# Patient Record
Sex: FEMALE | Race: WHITE | NOT HISPANIC OR LATINO | Employment: OTHER | URBAN - METROPOLITAN AREA
[De-identification: names, ages, dates, MRNs, and addresses within clinical notes are randomized per-mention and may not be internally consistent; named-entity substitution may affect disease eponyms.]

---

## 2017-03-11 ENCOUNTER — HOSPITAL ENCOUNTER (INPATIENT)
Facility: HOSPITAL | Age: 72
LOS: 5 days | Discharge: HOME | DRG: 064 | End: 2017-03-16
Attending: ANESTHESIOLOGY | Admitting: ANESTHESIOLOGY
Payer: MEDICARE

## 2017-03-11 DIAGNOSIS — G81.91 HEMIPARESIS, RIGHT: ICD-10-CM

## 2017-03-11 DIAGNOSIS — I61.9 LEFT-SIDED NONTRAUMATIC INTRACEREBRAL HEMORRHAGE: ICD-10-CM

## 2017-03-11 DIAGNOSIS — I62.9 INTRACRANIAL BLEED: ICD-10-CM

## 2017-03-11 DIAGNOSIS — F17.210 CIGARETTE SMOKER: ICD-10-CM

## 2017-03-11 DIAGNOSIS — G93.6 VASOGENIC CEREBRAL EDEMA: ICD-10-CM

## 2017-03-11 DIAGNOSIS — I61.0 NONTRAUMATIC SUBCORTICAL HEMORRHAGE OF LEFT CEREBRAL HEMISPHERE: ICD-10-CM

## 2017-03-11 LAB
ABO + RH BLD: NORMAL
APTT BLDCRRT: 23.1 SEC
BLD GP AB SCN CELLS X3 SERPL QL: NORMAL
CHOLEST/HDLC SERPL: 4.7 {RATIO}
HDL/CHOLESTEROL RATIO: 21.3 %
HDLC SERPL-MCNC: 254 MG/DL
HDLC SERPL-MCNC: 54 MG/DL
INR PPP: 1
LDLC SERPL CALC-MCNC: 177.6 MG/DL
NONHDLC SERPL-MCNC: 200 MG/DL
POCT GLUCOSE: 100 MG/DL (ref 70–110)
POCT GLUCOSE: 123 MG/DL (ref 70–110)
PROTHROMBIN TIME: 10.9 SEC
TRIGL SERPL-MCNC: 112 MG/DL
TSH SERPL DL<=0.005 MIU/L-ACNC: 3.06 UIU/ML

## 2017-03-11 PROCEDURE — 99223 1ST HOSP IP/OBS HIGH 75: CPT | Mod: ,,, | Performed by: PSYCHIATRY & NEUROLOGY

## 2017-03-11 PROCEDURE — 63600175 PHARM REV CODE 636 W HCPCS: Performed by: PHYSICIAN ASSISTANT

## 2017-03-11 PROCEDURE — 86850 RBC ANTIBODY SCREEN: CPT

## 2017-03-11 PROCEDURE — 85730 THROMBOPLASTIN TIME PARTIAL: CPT | Mod: 91

## 2017-03-11 PROCEDURE — 84443 ASSAY THYROID STIM HORMONE: CPT

## 2017-03-11 PROCEDURE — 25000003 PHARM REV CODE 250

## 2017-03-11 PROCEDURE — 83036 HEMOGLOBIN GLYCOSYLATED A1C: CPT

## 2017-03-11 PROCEDURE — 20000000 HC ICU ROOM

## 2017-03-11 PROCEDURE — 86900 BLOOD TYPING SEROLOGIC ABO: CPT

## 2017-03-11 PROCEDURE — 80061 LIPID PANEL: CPT

## 2017-03-11 PROCEDURE — 25000003 PHARM REV CODE 250: Performed by: PHYSICIAN ASSISTANT

## 2017-03-11 PROCEDURE — 99291 CRITICAL CARE FIRST HOUR: CPT | Mod: 25,,, | Performed by: ANESTHESIOLOGY

## 2017-03-11 PROCEDURE — 25000003 PHARM REV CODE 250: Performed by: ANESTHESIOLOGY

## 2017-03-11 PROCEDURE — 36620 INSERTION CATHETER ARTERY: CPT | Mod: ,,, | Performed by: ANESTHESIOLOGY

## 2017-03-11 PROCEDURE — 25000003 PHARM REV CODE 250: Performed by: PSYCHIATRY & NEUROLOGY

## 2017-03-11 PROCEDURE — 85610 PROTHROMBIN TIME: CPT | Mod: 91

## 2017-03-11 RX ORDER — SODIUM CHLORIDE 9 MG/ML
INJECTION, SOLUTION INTRAVENOUS CONTINUOUS
Status: DISCONTINUED | OUTPATIENT
Start: 2017-03-11 | End: 2017-03-13

## 2017-03-11 RX ORDER — ONDANSETRON 8 MG/1
8 TABLET, ORALLY DISINTEGRATING ORAL EVERY 8 HOURS PRN
Status: DISCONTINUED | OUTPATIENT
Start: 2017-03-11 | End: 2017-03-16 | Stop reason: HOSPADM

## 2017-03-11 RX ORDER — SODIUM CHLORIDE 0.9 % (FLUSH) 0.9 %
3 SYRINGE (ML) INJECTION EVERY 8 HOURS
Status: DISCONTINUED | OUTPATIENT
Start: 2017-03-11 | End: 2017-03-16 | Stop reason: HOSPADM

## 2017-03-11 RX ORDER — LABETALOL HYDROCHLORIDE 5 MG/ML
10 INJECTION, SOLUTION INTRAVENOUS EVERY 4 HOURS PRN
Status: DISCONTINUED | OUTPATIENT
Start: 2017-03-11 | End: 2017-03-14

## 2017-03-11 RX ORDER — NICARDIPINE HYDROCHLORIDE 0.2 MG/ML
INJECTION INTRAVENOUS
Status: COMPLETED
Start: 2017-03-11 | End: 2017-03-11

## 2017-03-11 RX ORDER — NICARDIPINE HYDROCHLORIDE 0.2 MG/ML
1 INJECTION INTRAVENOUS CONTINUOUS
Status: DISCONTINUED | OUTPATIENT
Start: 2017-03-11 | End: 2017-03-12

## 2017-03-11 RX ORDER — NAPROXEN SODIUM 220 MG
220 TABLET ORAL NIGHTLY PRN
COMMUNITY
End: 2021-08-18

## 2017-03-11 RX ADMIN — DESMOPRESSIN ACETATE 24.39 MCG: 4 SOLUTION INTRAVENOUS at 09:03

## 2017-03-11 RX ADMIN — SODIUM CHLORIDE 1000 ML: 0.9 INJECTION, SOLUTION INTRAVENOUS at 09:03

## 2017-03-11 RX ADMIN — SODIUM CHLORIDE: 0.9 INJECTION, SOLUTION INTRAVENOUS at 04:03

## 2017-03-11 RX ADMIN — NICARDIPINE HYDROCHLORIDE 10 MG/HR: 0.2 INJECTION, SOLUTION INTRAVENOUS at 04:03

## 2017-03-11 NOTE — CONSULTS
[facility]  Vascular Neurology  Comprehensive Stroke Center  Consult Note      Inpatient consult to Vascular (Stroke) Neurology  Consult performed by: GREGORY CURIEL  Consult ordered by: LOPEZ DUBOIS        Subjective:     History of Present Illness:  Kaylin Neff is a 71 y.o. Female with PMHx of HTN presented to OSH with slurred speech, R arm weakness, and balance issues. She was last known to be normal at 9am on 3/11/17. The patient states that she was in the bathroom washing her hair when her symptoms began. Telestroke call was made and head CT ordered. Initial NIH score was 5. Patient had L ICH on CT and [place] [facility] [place]                Past Medical History:   Diagnosis Date    Hypertension      No past surgical history on file.  No family history on file.  Social History   Substance Use Topics    Smoking status: Not on file    Smokeless tobacco: Not on file    Alcohol use Not on file     Review of patient's allergies indicates:  No Known Allergies  Medications: I have reviewed the current medication administration record.    No prescriptions prior to admission.       Review of Systems   Constitutional: Negative for chills and fever.   HENT: Negative for drooling.    Eyes: Negative for redness.   Respiratory: Negative for cough and shortness of breath.    Cardiovascular: Negative for chest pain and palpitations.   Gastrointestinal: Negative for vomiting.   Musculoskeletal: Negative for joint swelling.   Skin: Negative for rash.   Neurological: Positive for facial asymmetry, speech difficulty, weakness and numbness.   Psychiatric/Behavioral: Negative for agitation and behavioral problems.     Objective:     Vital Signs (Most Recent):  Pulse: 85 (03/11/17 1445)  Resp: 17 (03/11/17 1445)  BP: 128/64 (03/11/17 1445)  SpO2: 98 % (03/11/17 1445)    Vital Signs Range (Last 24H):  Temp:  [98 °F (36.7 °C)]   Pulse:  []   Resp:  [12-23]   BP:  (128-235)/()   SpO2:  [97 %-99 %]     Physical Exam   Constitutional: She is oriented to person, place, and time. She appears well-developed and well-nourished. No distress.   HENT:   Head: Normocephalic and atraumatic.   Eyes: EOM are normal. Pupils are equal, round, and reactive to light.   Neck: Normal range of motion.   Cardiovascular: Normal rate.    Pulmonary/Chest: Effort normal. No respiratory distress.   Abdominal: Soft. She exhibits no distension.   Musculoskeletal: Normal range of motion.   Neurological: She is alert and oriented to person, place, and time. GCS eye subscore is 4 - spontaneous. GCS verbal subscore is 5 - oriented. GCS motor subscore is 6 - obeys commands.   Skin: Skin is warm and dry.   Psychiatric: She has a normal mood and affect. Her behavior is normal.   Vitals reviewed.      Neurological Exam:   LOC: alert and follows requests  Language: No aphasia  Speech: Dysarthria  Orientation: Person, Place, Time  Memory: Recent memory intact, Remote memory intact, Age correct, Month correct  Visual Fields (CN II): Full  EOM (CN III, IV, VI): Full/intact  Oculocephalics: normal  Pupils (CN III, IV, VI): PERRL  Facial Sensation (CN V): Symmetric  Facial Movement (CN VII): lower weakness right lower  Hearing (CN VIII): intact bilaterally  Motor*: Arm Left:  Normal (5/5), Leg Left:   Normal (5/5), Arm Right:   Paretic:  4/5, Leg Right:   Paretic:  4/5  Cerebellar*: Normal limb, Normal gait  , Normal stance  Sensation: rebeca-hypoesthesia right  Tone: Arm-Left: normal; Leg-Left: normal; Arm-Right: normal; Leg-Right: normal    NIH Stroke Scale:  Interval: baseline (upon arrival/admit)  Level of Consciousness: 0 - alert  LOC Questions: 0 - answers both correctly  LOC Commands: 0 - performs both correctly  Best Gaze: 0 - normal  Visual: 0 - no visual loss  Facial Palsy: 1 - minor  Motor Left Arm: 0 - no drift  Motor Right Arm: 1 - drift  Motor Left Le - no drift  Motor Right Le - drift  Limb  Ataxia: 0 - absent  Sensory: 1 - mild to moderate loss  Best Language: 1 - mild to moderate aphasia  Dysarthria: 1 - mild to moderate dysarthria  Extinction and Inattention: 0 - no neglect  NIH Stroke Scale Total: 6  Ruth Coma Scale:  Best Eye Response: 4 - spontaneous  Best Motor Response: 6 - obeys commands  Best Verbal Response: 5 - oriented  [place] Coma Scale Total: 15  Modified [place]d Scale:   Timeline: Prior to symptoms onset  Modified [place] Score: 0 - no symptoms    ICH Scale:   Ruth Coma Score: 0 - 13-15  Age > or = 80: 0 - no  ICH Volume > or = 30 mL: 0 - no  Intraventricular Hemorrhage: 0 - no  Infratentorial Origin of Hemorrhage: 0 - no  ICH Scale Total: 0      Laboratory:  CMP:   Recent Labs  Lab 03/11/17  1047   CALCIUM 10.0   ALBUMIN 4.6   PROT 7.9      K 4.7   CO2 29      BUN 18*   CREATININE 0.80   ALKPHOS 96   ALT 25   AST 30   BILITOT 1.2     CBC:   Recent Labs  Lab 03/11/17  1047   WBC 7.00   RBC 5.15   HGB 15.7   HCT 46.4      MCV 90   MCH 30.4   MCHC 33.8     Lipid Panel: No results for input(s): CHOL, LDLCALC, HDL, TRIG in the last 168 hours.  Coagulation:   Recent Labs  Lab 03/11/17  1047   INR 1.0   APTT 27.0     Platelet Aggregation Study: No results for input(s): PLTAGG, PLTAGINTERP, PLTAGREGLACO, ADPPLTAGGREG in the last 168 hours.  Hgb A1C: No results for input(s): HGBA1C in the last 168 hours.  TSH: No results for input(s): TSH in the last 168 hours.    Diagnostic Results:  Brain Imaging: CT Head. Date: 03/11/17  Acute Pathology: ICH  Location: Frontal:  left          Assessment/Plan:     Patient is a 71 y.o. year old female with:    * Left-sided nontraumatic intracerebral hemorrhage  Kaylin Neff is a 71 y.o. female with PMHx of HTN with L frontal ICH    Antithrombotics for secondary stroke prevention: None: Reason:  Hemorrhagic Stroke, Statins for secondary stroke prevention and hyperlipidemia, if present: Atorvastatin- 40 mg oral daily, Aggressive risk  factor modification: Hypertension and Smoking, Rehab Efforts: Physical Therapy, Occupational Therapy and Speech and Language Pathology, Diagnostics: Ordered/Pending HgbA1C to assess blood glucose levels, Lipid Profile to assess cholesterol levels, MRA head to assess vasculature, MRA neck/arch to assess vasculature, MRI head without contrast to assess brain parenchyma, Trans-thoracic cardiac echo to assess cardiac function/status, TSH to assess thyroid function, VTE Prophylaxis: SCDs, BP Parameters: SBP <140, Nursing Orders: Neuro checks- Q1H, Swallowing evaluation by Nursing, Seizure precautions, Out of bed BID, Avoid Landaverde catheter, Pneumatic compression device, Stroke Education, Blood glucose target 100-130, Up with assistance and IV Fluids: per primary team      Hemiparesis, right  Patient with 4/5 strength in RUE and RLE  PT and OT to evaluate and treat    Essential hypertension  Stroke risk factor  SBP <140      Vasogenic cerebral edema  Evident on imaging  2/2 ICH    Cigarette smoker  Stroke risk factor   on cessation      Thrombolysis Candidate? No  1. Contraindications: CT findings (ICH, SAH, major infact sign)    Interventional Revascularization Candidate?  no, ICH    Research Candidate? No        Myesha Abad PA-C  Comprehensive Stroke Center  Department of Vascular Neurology   [facility]

## 2017-03-11 NOTE — IP AVS SNAPSHOT
[facility]  [address]  [facility] [address]  Phone: [phone]           Patient Discharge Instructions     Our goal is to set you up for success. This packet includes information on your condition, medications, and your home care. It will help you to care for yourself so you don't get sicker and need to go back to the hospital.     Please ask your nurse if you have any questions.        There are many details to remember when preparing to leave the hospital. Here is what you will need to do:    1. Take your medicine. If you are prescribed medications, review your Medication List in the following pages. You may have new medications to  at the pharmacy and others that you'll need to stop taking. Review the instructions for how and when to take your medications. Talk with your doctor or nurses if you are unsure of what to do.     2. Go to your follow-up appointments. Specific follow-up information is listed in the following pages. Your may be contacted by a transition nurse or clinical provider about future appointments. Be sure we have all of the phone numbers to reach you, if needed. Please contact your provider's office if you are unable to make an appointment.     3. Watch for warning signs. Your doctor or nurse will give you detailed warning signs to watch for and when to call for assistance. These instructions may also include educational information about your condition. If you experience any of warning signs to your health, call your doctor.               [facility]  Unless otherwise directed by your provider, please contact [facility], our nurse care line that is available for 24/7 assistance.     [phone] (toll-free)    Registered nurses in the [facility] provide clinical advisement, health education, appointment booking, and other advisory services.                    ** Verify the list of medication(s) below is accurate and up  to date. Carry this with you in case of emergency. If your medications have changed, please notify your healthcare provider.             Medication List      START taking these medications        Additional Info                      atorvastatin 40 MG tablet   Commonly known as:  LIPITOR   Quantity:  90 tablet   Refills:  3   Dose:  40 mg    Last time this was given:  40 mg on 3/16/2017  9:20 AM   Instructions:  Take 1 tablet (40 mg total) by mouth once daily.     Begin Date    AM    Noon    PM    Bedtime       lisinopril 20 MG tablet   Commonly known as:  PRINIVIL,ZESTRIL   Quantity:  90 tablet   Refills:  3   Dose:  20 mg    Last time this was given:  20 mg on 3/16/2017  9:20 AM   Instructions:  Take 1 tablet (20 mg total) by mouth once daily.     Begin Date    AM    Noon    PM    Bedtime       metoprolol tartrate 25 MG tablet   Commonly known as:  LOPRESSOR   Quantity:  84 tablet   Refills:  0   Dose:  25 mg    Last time this was given:  25 mg on 3/16/2017  2:21 PM   Instructions:  Take 1 tablet (25 mg total) by mouth 3 (three) times daily.     Begin Date    AM    Noon    PM    Bedtime         CONTINUE taking these medications        Additional Info                      naproxen sodium 220 MG tablet   Commonly known as:  ANAPROX   Refills:  0   Dose:  220 mg    Instructions:  Take 220 mg by mouth nightly as needed.     Begin Date    AM    Noon    PM    Bedtime            Where to Get Your Medications      These medications were sent to [facility] [address] [place] [id number]     Phone:  [phone]     atorvastatin 40 MG tablet    lisinopril 20 MG tablet    metoprolol tartrate 25 MG tablet                  Please bring to all follow up appointments:    1. A copy of your discharge instructions.  2. All medicines you are currently taking in their original bottles.  3. Identification and insurance card.    Please arrive 15 minutes ahead of scheduled  "appointment time.    Please call 24 hours in advance if you must reschedule your appointment and/or time.        Your Scheduled Appointments     Mar 27, 2017  2:15 PM CDT   Mri Brain Cont with [facility] MRI2   [facility]    [address]  [facility] [address] [phone]            Mar 27, 2017  3:20 PM CDT   Established Patient Visit with [facility] - Neurosurgery 7th Fl ([address] )    [address]  [facility] [address] [phone]                  Primary Diagnosis     Your primary diagnosis was:  Left-Sided Nontraumatic Intracerebral Hemorrhage      Admission Information     Date & Time Provider Department CSN    3/11/2017  2:11 PM Zoraida Walton MD [facility] [id number]      Care Providers     Provider Role Specialty Primary office phone    Zoraida Walton MD Attending Provider Neurology [phone]    Deandre Jordan MD Team Attending  Neuro Critical Care [phone]    Mitchell Anne MD Team Attending  Neuro Critical Care [phone]    Sathish Beverly MD Team Attending  Interventional Neurology [phone]    Joaquim Burns MD Team Attending  Neurology [phone]      Your Vitals Were     BP Pulse Temp Resp Height Weight    126/65 (BP Location: Left arm, Patient Position: Lying, BP Method: Automatic) 70 98.6 °F (37 °C) (Oral) 18 5' 6.5" (1.689 m) 80.5 kg (177 lb 7.5 oz)    SpO2 BMI             95% 28.22 kg/m2         Recent Lab Values        3/11/2017                           4:25 PM           A1C 6.1           Comment for A1C at  4:25 PM on 3/11/2017:  According to ADA guidelines, hemoglobin A1C <7.0% represents  optimal control in non-pregnant diabetic patients.  Different  metrics may apply to specific populations.   Standards of Medical Care in Diabetes - 2016.  For the purpose of screening for the presence of diabetes:  <5.7%     Consistent with the absence of " diabetes  5.7-6.4%  Consistent with increasing risk for diabetes   (prediabetes)  >or=6.5%  Consistent with diabetes  Currently no consensus exists for use of hemoglobin A1C  for diagnosis of diabetes for children.        Allergies as of 3/16/2017     No Known Allergies      Advance Directives     An advance directive is a document which, in the event you are no longer able to make decisions for yourself, tells your healthcare team what kind of treatment you do or do not want to receive, or who you would like to make those decisions for you.  If you do not currently have an advance directive, [facility] encourages you to create one.  For more information call:  [phone]), [phone] ([phone]),  or log on to [email or web address].        Language Assistance Services     ATTENTION: Language assistance services are available, free of charge. Please call [phone].      ATENCIÓN: Si habla español, tiene a alejandra disposición servicios gratuitos de asistencia lingüística. Llame al [phone].     [facility] Ý: N?u b?n nói Ti?ng Vi?t, có các d?ch v? h? tr? ngôn ng? mi?n phí dành cho b?n. G?i s? [phone].        Stroke Education              [facility] Sign-Up     Activating your [facility] account is as easy as 1-2-3!     1) Visit [email or web address], select Sign Up Now, enter this activation code and your date of birth, then select Next.  [email or web address]  Expires: 4/30/2017  3:40 PM      2) Create a username and password to use when you visit [facility] in the future and select a security question in case you lose your password and select Next.    3) Enter your e-mail address and click Sign Up!    Additional Information  If you have questions, please e-mail [email or web address] or call [phone] to talk to our [facility] staff. Remember, [facility] is NOT to be used for urgent needs. For medical emergencies, dial 911.          [facility] complies with applicable Federal civil  rights laws and does not discriminate on the basis of race, color, national origin, age, disability, or sex.

## 2017-03-11 NOTE — CONSULTS
Consult Note  Neurosurgery    Consult Requested By: NCC  Reason for Consult: ICH    SUBJECTIVE:     History of Present Illness:  Patient is a 71 y.o. female with PMHX of HTN who presents with ICH.  Per report pt developed acute onset RUE weakness starting at 9 am.  She denies any HA, vomiting, or other extremity weakness.  She does endorse numbness in her RUE.  No prior hx of stroke and takes no anticoagulation.    Scheduled Meds:   niCARdipine        sodium chloride 0.9%  3 mL Intravenous Q8H     Continuous Infusions:   PRN Meds:labetalol, ondansetron    Review of patient's allergies indicates:  No Known Allergies    Past Medical History:   Diagnosis Date    Hypertension      No past surgical history on file.  No family history on file.  Social History   Substance Use Topics    Smoking status: Not on file    Smokeless tobacco: Not on file    Alcohol use Not on file        Review of Systems:  See HPI    OBJECTIVE:     Vital Signs (Most Recent)  Pulse: 85 (03/11/17 1445)  Resp: 17 (03/11/17 1445)  BP: 128/64 (03/11/17 1445)  SpO2: 98 % (03/11/17 1445)    Vital Signs Range (Last 24H):  Temp:  [98 °F (36.7 °C)]   Pulse:  []   Resp:  [12-23]   BP: (128-235)/()   SpO2:  [97 %-99 %]     Physical Exam:  AAOX3, mild word finding difficulty  PERRL, EOMI, face symm, tongue midline  3/5 in RUE, 4/5 in RLE, 5/5 on left  SILT  Reflexes symm  +drift on right, visual fields grossly intact    Laboratory:  CBC:   Recent Labs  Lab 03/11/17  1047   WBC 7.00   RBC 5.15   HGB 15.7   HCT 46.4      MCV 90   MCH 30.4   MCHC 33.8     CMP:   Recent Labs  Lab 03/11/17  1047      CALCIUM 10.0   ALBUMIN 4.6   PROT 7.9      K 4.7   CO2 29      BUN 18*   CREATININE 0.80   ALKPHOS 96   ALT 25   AST 30   BILITOT 1.2     Coagulation:   Recent Labs  Lab 03/11/17  1047   INR 1.0   APTT 27.0     Cardiac markers:   Recent Labs  Lab 03/11/17  1047   TROPONINI <0.012     No results for input(s): COLORU,  CLARITYU, SPECGRAV, PHUR, PROTEINUA, GLUCOSEU, BILIRUBINCON, BLOODU, WBCU, RBCU, BACTERIA, MUCUS, NITRITE, LEUKOCYTESUR, UROBILINOGEN, HYALINECASTS in the last 168 hours.      Diagnostic Results:  CT head: left posterior parietal ICH 2 x3 x3 cm.  No midline shift.     ASSESSMENT/PLAN:     71 F with left posterior parietal ICH.  ICH of 0.  -Admit to [facility]CC for q 1 hr neuro checks  -SBP less than 140  -Na goal 140-150  -Rec MRI w and w/o  -Maximal medical mgmt per NCC  -Will follow.

## 2017-03-11 NOTE — H&P
Neuro Critical Care  H&P    Chief Complaint: ICH    Admit Date: 3/11/2017   Length of Stay:  LOS: 0 days     HPI:   Patient is a 71 y.o. female with PMHx of HTN, current smoker who presented earlier today with sudden onset right arm weakness. Symptoms reported to have started around 930 this morning while pt was combing her hair after washing it when she noticed that her right arm was weak. She then took a full dose aspirin today when she realized that she may have had a stroke. Denies prior history of stroke. Pt reports that she was diagnosed with HTN 3 years back but has taken anti-hypertensives even though it was recommended; as she felt that she did not need to take medications. She is a current smoker; smoking since 15; 1pk/day. Does not take aspirin regularly; only when she has a headache; as reported above, took aspirin earlier today.    Review of Systems:     Constitutional: no fever or chills  Eyes: no visual changes  ENT: no nasal congestion or sore throat  Respiratory: no cough or shortness of breath  Cardiovascular: no chest pain or palpitations  Gastrointestinal: no nausea or vomiting, no abdominal pain or change in bowel habits  Genitourinary: no hematuria or dysuria  Musculoskeletal: no arthralgias or myalgias  Neurological: no seizures or tremors  Endocrine: no heat or cold intolerance    Past Medical & Surgical History:     Past Medical History:   Diagnosis Date    Hypertension                                                                             No past surgical history on file.     Home Medications:     Prior to Admission medications    Not on File       Allergies:     Review of patient's allergies indicates:  No Known Allergies     Social & Family History:     Social History     Social History    Marital status:      Spouse name: N/A    Number of children: N/A    Years of education: N/A     Social History Main Topics    Smoking status: Not on file    Smokeless tobacco: Not on  file    Alcohol use Not on file    Drug use: Not on file    Sexual activity: Not on file     Other Topics Concern    Not on file     Social History Narrative    No narrative on file                                                                          No family history on file.     Objective:     Vital Signs (Most Recent):  Pulse: 85 (03/11/17 1445)  Resp: 17 (03/11/17 1445)  BP: 128/64 (03/11/17 1445)  SpO2: 98 % (03/11/17 1445) Vital Signs Range (Last 24H):  Temp:  [98 °F (36.7 °C)]   Pulse:  []   Resp:  [12-23]   BP: (128-235)/()   SpO2:  [97 %-99 %]      Body mass index is 28.93 kg/(m^2).     Hemodynamic Monitoring:         Physical Exam:     General: well developed, well nourished  Head: normocephalic, atraumatic  Eyes:  conjunctivae/corneas clear. PERRL.  Skin: Skin color, texture, turgor normal. No rashes or lesions  Neurologic: Mental status: Alert, oriented, thought content appropriate  Cranial nerves: II: visual field normal, II: pupils equal, round, reactive to light and accommodation, III,VII: ptosis none, III,IV,VI: extraocular muscles extra-ocular motions intact, V: facial light touch sensation normal bilaterally, VII: upper facial muscle function normal bilaterally, VII: lower facial muscle function reduced on the right, XII: tongue strength normal   Sensory: normal  Motor:3/5 proximally and distally on the RUE. 5/5 on the LUE, LLE. 4+/5 proximally on the RLE; 5/5 distally  Coordination: normal finger to nose on the left; limited by weakness on the right    Laboratory:  CBC:   Recent Labs  Lab 03/11/17  1047   WBC 7.00   RBC 5.15   HGB 15.7   HCT 46.4      MCV 90   MCH 30.4   MCHC 33.8     BMP:   Recent Labs  Lab 03/11/17  1047         K 4.7      CO2 29   BUN 18*   CREATININE 0.80   CALCIUM 10.0     CMP:   Recent Labs  Lab 03/11/17  1047      CALCIUM 10.0   ALBUMIN 4.6   PROT 7.9      K 4.7   CO2 29      BUN 18*   CREATININE 0.80    ALKPHOS 96   ALT 25   AST 30   BILITOT 1.2     ABGs: No results for input(s): PH, PCO2, PO2, HCO3, POCSATURATED, BE in the last 168 hours.    Diagnostic Results:  CT head without contrast (3/11/2017)   Intraparenchymal hematoma in the left parietal lobe measuring 3 cm with some surrounding edema and mass effect.    ASSESSMENT/PLAN:   Hospital Problems:  Active Hospital Problems    Diagnosis  POA    Intracranial bleed [I62.9]  Yes      Resolved Hospital Problems    Diagnosis Date Resolved POA   No resolved problems to display.       Primary Diagnosis:  Left-sided nontraumatic intracerebral hemorrhage    Plan:  Neuro:   Non-traumatic left sided ICH  - no hydrocephalus or midline shift. +mass effect  - History on reported untreated HTN, possible hypertensive bleed but ?possible hemorrhagic stroke  - repeat CT head ordered for 6 hours from initial scan; consider CTA  - NSGY following  - SBP<140; cardene PRN  - Lipid panel pending    Pulmonary:     CXR (3/11): no evidence of cardiopulmonary process  On RA, no active issues    Current smoker - consider nicotine patch     Cardiac:   ECHO pending    SBP goal <140; labetalol PRN; cardene PRN    Renal:   I & O (Last 24H):  No intake or output data in the 24 hours ending 03/11/17 1532    BUN/Cr 18/0.8    Infectious Disease:   No leukocytosis, afebrile    Hematology/Oncology:  H/H stable  Platelets: [id number]  PT/INR: 13.2/1.0     Endocrine:   Hba1c pending  TSH pending  SSI    Fluids/Electrolytes/Nutrition/GI:   IVF: NS@ 75cc/hr    Prophylaxis:  Holding chemical ppx in the setting of acute bleed, SCD    Lines:  Peripheral IV       Ghada Sheikh  PGY 2

## 2017-03-11 NOTE — SUBJECTIVE & OBJECTIVE
Past Medical History:   Diagnosis Date    Hypertension      No past surgical history on file.  No family history on file.  Social History   Substance Use Topics    Smoking status: Not on file    Smokeless tobacco: Not on file    Alcohol use Not on file     Review of patient's allergies indicates:  No Known Allergies  Medications: I have reviewed the current medication administration record.    No prescriptions prior to admission.       Review of Systems   Constitutional: Negative for chills and fever.   HENT: Negative for drooling.    Eyes: Negative for redness.   Respiratory: Negative for cough and shortness of breath.    Cardiovascular: Negative for chest pain and palpitations.   Gastrointestinal: Negative for vomiting.   Musculoskeletal: Negative for joint swelling.   Skin: Negative for rash.   Neurological: Positive for facial asymmetry, speech difficulty, weakness and numbness.   Psychiatric/Behavioral: Negative for agitation and behavioral problems.     Objective:     Vital Signs (Most Recent):  Pulse: 85 (03/11/17 1445)  Resp: 17 (03/11/17 1445)  BP: 128/64 (03/11/17 1445)  SpO2: 98 % (03/11/17 1445)    Vital Signs Range (Last 24H):  Temp:  [98 °F (36.7 °C)]   Pulse:  []   Resp:  [12-23]   BP: (128-235)/()   SpO2:  [97 %-99 %]     Physical Exam   Constitutional: She is oriented to person, place, and time. She appears well-developed and well-nourished. No distress.   HENT:   Head: Normocephalic and atraumatic.   Eyes: EOM are normal. Pupils are equal, round, and reactive to light.   Neck: Normal range of motion.   Cardiovascular: Normal rate.    Pulmonary/Chest: Effort normal. No respiratory distress.   Abdominal: Soft. She exhibits no distension.   Musculoskeletal: Normal range of motion.   Neurological: She is alert and oriented to person, place, and time. GCS eye subscore is 4 - spontaneous. GCS verbal subscore is 5 - oriented. GCS motor subscore is 6 - obeys commands.   Skin: Skin is  warm and dry.   Psychiatric: She has a normal mood and affect. Her behavior is normal.   Vitals reviewed.      Neurological Exam:   LOC: alert and follows requests  Language: No aphasia  Speech: Dysarthria  Orientation: Person, Place, Time  Memory: Recent memory intact, Remote memory intact, Age correct, Month correct  Visual Fields (CN II): Full  EOM (CN III, IV, VI): Full/intact  Oculocephalics: normal  Pupils (CN III, IV, VI): PERRL  Facial Sensation (CN V): Symmetric  Facial Movement (CN VII): lower weakness right lower  Hearing (CN VIII): intact bilaterally  Motor*: Arm Left:  Normal (5/5), Leg Left:   Normal (5/5), Arm Right:   Paretic:  4/5, Leg Right:   Paretic:  4/5  Cerebellar*: Normal limb, Normal gait  , Normal stance  Sensation: rebeca-hypoesthesia right  Tone: Arm-Left: normal; Leg-Left: normal; Arm-Right: normal; Leg-Right: normal    NIH Stroke Scale:  Interval: baseline (upon arrival/admit)  Level of Consciousness: 0 - alert  LOC Questions: 0 - answers both correctly  LOC Commands: 0 - performs both correctly  Best Gaze: 0 - normal  Visual: 0 - no visual loss  Facial Palsy: 1 - minor  Motor Left Arm: 0 - no drift  Motor Right Arm: 1 - drift  Motor Left Le - no drift  Motor Right Le - drift  Limb Ataxia: 0 - absent  Sensory: 1 - mild to moderate loss  Best Language: 1 - mild to moderate aphasia  Dysarthria: 1 - mild to moderate dysarthria  Extinction and Inattention: 0 - no neglect  NIH Stroke Scale Total: 6  Ruth Coma Scale:  Best Eye Response: 4 - spontaneous  Best Motor Response: 6 - obeys commands  Best Verbal Response: 5 - oriented  [place] Coma Scale Total: 15  Modified [place] Scale:   Timeline: Prior to symptoms onset  Modified [place] Score: 0 - no symptoms    ICH Scale:   Ruth Coma Score: 0 - 13-15  Age > or = 80: 0 - no  ICH Volume > or = 30 mL: 0 - no  Intraventricular Hemorrhage: 0 - no  Infratentorial Origin of Hemorrhage: 0 - no  ICH Scale Total: 0      Laboratory:  CMP:    Recent Labs  Lab 03/11/17  1047   CALCIUM 10.0   ALBUMIN 4.6   PROT 7.9      K 4.7   CO2 29      BUN 18*   CREATININE 0.80   ALKPHOS 96   ALT 25   AST 30   BILITOT 1.2     CBC:   Recent Labs  Lab 03/11/17  1047   WBC 7.00   RBC 5.15   HGB 15.7   HCT 46.4      MCV 90   MCH 30.4   MCHC 33.8     Lipid Panel: No results for input(s): CHOL, LDLCALC, HDL, TRIG in the last 168 hours.  Coagulation:   Recent Labs  Lab 03/11/17  1047   INR 1.0   APTT 27.0     Platelet Aggregation Study: No results for input(s): PLTAGG, PLTAGINTERP, PLTAGREGLACO, ADPPLTAGGREG in the last 168 hours.  Hgb A1C: No results for input(s): HGBA1C in the last 168 hours.  TSH: No results for input(s): TSH in the last 168 hours.    Diagnostic Results:  Brain Imaging: CT Head. Date: 03/11/17  Acute Pathology: ICH  Location: Frontal:  left

## 2017-03-11 NOTE — NURSING
Patient arrived to [facility] from [facility] by [facility].     Left parietal ICH.    Patients current symptoms include RUE weakness drift to right upper and lower extremeties, right facial droop (slight), and occasional word finding.

## 2017-03-11 NOTE — PLAN OF CARE
Problem: Patient Care Overview  Goal: Individualization & Mutuality  Outcome: Ongoing (interventions implemented as appropriate)  Admit Date: 03/11/17     Admit Dx: L parietal ICH      Past Medical History:   Diagnosis Date    Hypertension          No past surgical history on file.     Individualization:   1. Likes an extra blanket     Restraints: NA

## 2017-03-12 PROBLEM — E78.5 HYPERLIPIDEMIA: Status: ACTIVE | Noted: 2017-03-12

## 2017-03-12 LAB
ALBUMIN SERPL BCP-MCNC: 3.3 G/DL
ALP SERPL-CCNC: 69 U/L
ALT SERPL W/O P-5'-P-CCNC: 13 U/L
ANION GAP SERPL CALC-SCNC: 8 MMOL/L
AST SERPL-CCNC: 17 U/L
BASOPHILS # BLD AUTO: 0.02 K/UL
BASOPHILS NFR BLD: 0.2 %
BILIRUB SERPL-MCNC: 1.3 MG/DL
BUN SERPL-MCNC: 14 MG/DL
CALCIUM SERPL-MCNC: 8.6 MG/DL
CHLORIDE SERPL-SCNC: 108 MMOL/L
CO2 SERPL-SCNC: 22 MMOL/L
CREAT SERPL-MCNC: 0.6 MG/DL
DIFFERENTIAL METHOD: ABNORMAL
EOSINOPHIL # BLD AUTO: 0.3 K/UL
EOSINOPHIL NFR BLD: 3.3 %
ERYTHROCYTE [DISTWIDTH] IN BLOOD BY AUTOMATED COUNT: 13.5 %
EST. GFR  (AFRICAN AMERICAN): >60 ML/MIN/1.73 M^2
EST. GFR  (NON AFRICAN AMERICAN): >60 ML/MIN/1.73 M^2
ESTIMATED AVG GLUCOSE: 128 MG/DL
GLUCOSE SERPL-MCNC: 110 MG/DL
HBA1C MFR BLD HPLC: 6.1 %
HCT VFR BLD AUTO: 37.4 %
HGB BLD-MCNC: 12.3 G/DL
LYMPHOCYTES # BLD AUTO: 2.6 K/UL
LYMPHOCYTES NFR BLD: 27.4 %
MAGNESIUM SERPL-MCNC: 1.5 MG/DL
MCH RBC QN AUTO: 29.4 PG
MCHC RBC AUTO-ENTMCNC: 32.9 %
MCV RBC AUTO: 89 FL
MONOCYTES # BLD AUTO: 1.1 K/UL
MONOCYTES NFR BLD: 11.3 %
NEUTROPHILS # BLD AUTO: 5.4 K/UL
NEUTROPHILS NFR BLD: 57.6 %
PHOSPHATE SERPL-MCNC: 2.7 MG/DL
PLATELET # BLD AUTO: 180 K/UL
PMV BLD AUTO: 10.6 FL
POCT GLUCOSE: 129 MG/DL (ref 70–110)
POCT GLUCOSE: 129 MG/DL (ref 70–110)
POCT GLUCOSE: 131 MG/DL (ref 70–110)
POCT GLUCOSE: 74 MG/DL (ref 70–110)
POTASSIUM SERPL-SCNC: 3.6 MMOL/L
PROT SERPL-MCNC: 5.9 G/DL
RBC # BLD AUTO: 4.19 M/UL
SODIUM SERPL-SCNC: 135 MMOL/L
SODIUM SERPL-SCNC: 138 MMOL/L
WBC # BLD AUTO: 9.33 K/UL

## 2017-03-12 PROCEDURE — 99233 SBSQ HOSP IP/OBS HIGH 50: CPT | Mod: ,,, | Performed by: PSYCHIATRY & NEUROLOGY

## 2017-03-12 PROCEDURE — 97535 SELF CARE MNGMENT TRAINING: CPT

## 2017-03-12 PROCEDURE — 20000000 HC ICU ROOM

## 2017-03-12 PROCEDURE — 25000003 PHARM REV CODE 250: Performed by: PSYCHIATRY & NEUROLOGY

## 2017-03-12 PROCEDURE — 84295 ASSAY OF SERUM SODIUM: CPT

## 2017-03-12 PROCEDURE — 93306 TTE W/DOPPLER COMPLETE: CPT

## 2017-03-12 PROCEDURE — G8996 SWALLOW CURRENT STATUS: HCPCS | Mod: CI

## 2017-03-12 PROCEDURE — 84100 ASSAY OF PHOSPHORUS: CPT

## 2017-03-12 PROCEDURE — 63600175 PHARM REV CODE 636 W HCPCS: Performed by: PSYCHIATRY & NEUROLOGY

## 2017-03-12 PROCEDURE — 97162 PT EVAL MOD COMPLEX 30 MIN: CPT

## 2017-03-12 PROCEDURE — 25000003 PHARM REV CODE 250: Performed by: PHYSICIAN ASSISTANT

## 2017-03-12 PROCEDURE — 97165 OT EVAL LOW COMPLEX 30 MIN: CPT

## 2017-03-12 PROCEDURE — 97530 THERAPEUTIC ACTIVITIES: CPT

## 2017-03-12 PROCEDURE — 97802 MEDICAL NUTRITION INDIV IN: CPT

## 2017-03-12 PROCEDURE — 80053 COMPREHEN METABOLIC PANEL: CPT

## 2017-03-12 PROCEDURE — 92610 EVALUATE SWALLOWING FUNCTION: CPT

## 2017-03-12 PROCEDURE — 83735 ASSAY OF MAGNESIUM: CPT

## 2017-03-12 PROCEDURE — G8997 SWALLOW GOAL STATUS: HCPCS | Mod: CH

## 2017-03-12 PROCEDURE — 85025 COMPLETE CBC W/AUTO DIFF WBC: CPT

## 2017-03-12 PROCEDURE — 93306 TTE W/DOPPLER COMPLETE: CPT | Mod: 26,,, | Performed by: INTERNAL MEDICINE

## 2017-03-12 PROCEDURE — 99233 SBSQ HOSP IP/OBS HIGH 50: CPT | Mod: ,,, | Performed by: PHYSICIAN ASSISTANT

## 2017-03-12 PROCEDURE — 97112 NEUROMUSCULAR REEDUCATION: CPT

## 2017-03-12 RX ORDER — POTASSIUM CHLORIDE 20 MEQ/15ML
40 SOLUTION ORAL
Status: DISCONTINUED | OUTPATIENT
Start: 2017-03-12 | End: 2017-03-14

## 2017-03-12 RX ORDER — RAMELTEON 8 MG/1
8 TABLET ORAL ONCE
Status: COMPLETED | OUTPATIENT
Start: 2017-03-12 | End: 2017-03-12

## 2017-03-12 RX ORDER — MAGNESIUM SULFATE HEPTAHYDRATE 40 MG/ML
4 INJECTION, SOLUTION INTRAVENOUS
Status: DISCONTINUED | OUTPATIENT
Start: 2017-03-12 | End: 2017-03-14

## 2017-03-12 RX ORDER — MAGNESIUM SULFATE HEPTAHYDRATE 40 MG/ML
2 INJECTION, SOLUTION INTRAVENOUS
Status: DISCONTINUED | OUTPATIENT
Start: 2017-03-12 | End: 2017-03-14

## 2017-03-12 RX ORDER — LISINOPRIL 10 MG/1
10 TABLET ORAL DAILY
Status: DISCONTINUED | OUTPATIENT
Start: 2017-03-12 | End: 2017-03-13

## 2017-03-12 RX ORDER — POTASSIUM CHLORIDE 20 MEQ/15ML
60 SOLUTION ORAL
Status: DISCONTINUED | OUTPATIENT
Start: 2017-03-12 | End: 2017-03-14

## 2017-03-12 RX ORDER — ATORVASTATIN CALCIUM 20 MG/1
40 TABLET, FILM COATED ORAL DAILY
Status: DISCONTINUED | OUTPATIENT
Start: 2017-03-12 | End: 2017-03-16 | Stop reason: HOSPADM

## 2017-03-12 RX ADMIN — ATORVASTATIN CALCIUM 40 MG: 20 TABLET, FILM COATED ORAL at 11:03

## 2017-03-12 RX ADMIN — NICARDIPINE HYDROCHLORIDE 5 MG/HR: 0.2 INJECTION, SOLUTION INTRAVENOUS at 11:03

## 2017-03-12 RX ADMIN — RAMELTEON 8 MG: 8 TABLET, FILM COATED ORAL at 01:03

## 2017-03-12 RX ADMIN — MAGNESIUM SULFATE IN WATER 2 G: 40 INJECTION, SOLUTION INTRAVENOUS at 05:03

## 2017-03-12 RX ADMIN — LABETALOL HYDROCHLORIDE 10 MG: 5 INJECTION, SOLUTION INTRAVENOUS at 12:03

## 2017-03-12 RX ADMIN — SODIUM PHOSPHATE, MONOBASIC, MONOHYDRATE 15 MMOL: 276; 142 INJECTION, SOLUTION INTRAVENOUS at 06:03

## 2017-03-12 RX ADMIN — Medication 3 ML: at 06:03

## 2017-03-12 RX ADMIN — LISINOPRIL 10 MG: 10 TABLET ORAL at 11:03

## 2017-03-12 RX ADMIN — POTASSIUM CHLORIDE 40 MEQ: 20 SOLUTION ORAL at 06:03

## 2017-03-12 RX ADMIN — NICARDIPINE HYDROCHLORIDE 7.5 MG/HR: 0.2 INJECTION, SOLUTION INTRAVENOUS at 05:03

## 2017-03-12 NOTE — PLAN OF CARE
Problem: Physical Therapy Goal  Goal: Physical Therapy Goal  Goals to be met by: 3/20/2017     Patient will increase functional independence with mobility by performin. Supine to sit with Stand-by Assistance  2. Sit to supine with Stand-by Assistance  3. Sit to stand transfer with Stand-by Assistance  4. Bed to chair transfer with Contact Guard Assistance using AD or No AD  5. Gait x50 feet with Stand-by Assistance using AD or No AD  6. Sitting at edge of bed x10 minutes with Supervision  7. Stand for x10 minutes with Stand-by Assistance using Ad or NO AD  8. Lower extremity exercise program x15 reps per handout, with supervision  Outcome: Ongoing (interventions implemented as appropriate)     PT Evaluation complete. Recommending Rehab upon D/C. Pt is from [place] and would prefer facility closer to [place].     Rocio Espinosa, PT, DPT  [phone]  10/5/2016

## 2017-03-12 NOTE — SUBJECTIVE & OBJECTIVE
Neurologic Chief Complaint: L frontal ICH    Subjective:     Interval History: Patient is seen for follow-up neurological assessment and treatment recommendations: MARLENEON, patient has no complaints this morning    HPI, Past Medical, Family, and Social History remains the same as documented in the initial encounter.     Review of Systems   Constitutional: Negative for chills and fever.   HENT: Negative for drooling.    Eyes: Negative for redness.   Respiratory: Negative for cough and shortness of breath.    Cardiovascular: Negative for chest pain and palpitations.   Gastrointestinal: Negative for vomiting.   Musculoskeletal: Negative for joint swelling.   Skin: Negative for rash.   Neurological: Positive for facial asymmetry, speech difficulty and weakness. Negative for dizziness, numbness and headaches.   Psychiatric/Behavioral: Negative for agitation and behavioral problems.     Scheduled Meds:   atorvastatin  40 mg Oral Daily    lisinopril  10 mg Oral Daily    sodium chloride 0.9%  3 mL Intravenous Q8H     Continuous Infusions:   sodium chloride 0.9% 75 mL/hr at 03/12/17 0500    nicardipine 5 mg/hr (03/12/17 1131)     PRN Meds:labetalol, magnesium sulfate IVPB, magnesium sulfate IVPB, ondansetron, potassium chloride 10%, potassium chloride 10%, potassium chloride 10%, sodium phosphate IVPB, sodium phosphate IVPB, sodium phosphate IVPB    Objective:     Vital Signs (Most Recent):  Temp: 97.6 °F (36.4 °C) (03/12/17 0701)  Pulse: 73 (03/12/17 1000)  Resp: 15 (03/12/17 1000)  BP: (!) 124/55 (03/12/17 1130)  SpO2: 96 % (03/12/17 1000)       Vital Signs Range (Last 24H):  Temp:  [97.6 °F (36.4 °C)-98.3 °F (36.8 °C)]   Pulse:  []   Resp:  [12-36]   BP: (104-156)/()   SpO2:  [93 %-100 %]   Arterial Line BP: (111-152)/(34-60)        Physical Exam   Constitutional: She is oriented to person, place, and time. She appears well-developed and well-nourished. No distress.   HENT:   Head: Normocephalic and  atraumatic.   Eyes: EOM are normal. Pupils are equal, round, and reactive to light.   Neck: Normal range of motion.   Cardiovascular: Normal rate.    Pulmonary/Chest: Effort normal. No respiratory distress.   Abdominal: Soft. She exhibits no distension.   Musculoskeletal: Normal range of motion.   Neurological: She is alert and oriented to person, place, and time. GCS eye subscore is 4 - spontaneous. GCS verbal subscore is 5 - oriented. GCS motor subscore is 6 - obeys commands.   Skin: Skin is warm and dry.   Psychiatric: She has a normal mood and affect. Her behavior is normal.   Vitals reviewed.      Neurological Exam:   LOC: alert and follows requests  Language: Expressive aphasia  Speech: Dysarthria  Orientation: Person, Place, Time  Memory: Recent memory intact, Remote memory intact, Age correct, Month correct  Visual Fields (CN II): Full  EOM (CN III, IV, VI): Full/intact  Oculocephalics: normal  Pupils (CN III, IV, VI): PERRL  Facial Sensation (CN V): Symmetric  Facial Movement (CN VII): lower weakness right lower  Hearing (CN VIII): intact bilaterally  Motor*: Arm Left:  Normal (5/5), Leg Left:   Normal (5/5), Arm Right:   Paretic:  4/5, Leg Right:   Paretic:  4/5  Cerebellar*: Normal limb, Normal gait  , Normal stance  Sensation: intact to light touch, temperature and vibration  Tone: Arm-Left: normal; Leg-Left: normal; Arm-Right: normal; Leg-Right: normal    NIH Stroke Scale:    Level of Consciousness: 0 - alert  LOC Questions: 0 - answers both correctly  LOC Commands: 0 - performs both correctly  Best Gaze: 0 - normal  Visual: 0 - no visual loss  Facial Palsy: 1 - minor  Motor Left Arm: 0 - no drift  Motor Right Arm: 0 - no drift  Motor Left Le - drift  Motor Right Le - drift  Limb Ataxia: 0 - absent  Sensory: 0 - normal  Best Language: 1 - mild to moderate aphasia  Dysarthria: 1 - mild to moderate dysarthria  Extinction and Inattention: 0 - no neglect  NIH Stroke Scale Total: 5  [place] Coma  Scale:  Best Eye Response: 4 - spontaneous  Best Motor Response: 6 - obeys commands  Best Verbal Response: 5 - oriented        Laboratory:  CMP:   Recent Labs  Lab 03/12/17  0328   CALCIUM 8.6*   ALBUMIN 3.3*   PROT 5.9*      K 3.6   CO2 22*      BUN 14   CREATININE 0.6   ALKPHOS 69   ALT 13   AST 17   BILITOT 1.3*     CBC:   Recent Labs  Lab 03/12/17  0328   WBC 9.33   RBC 4.19   HGB 12.3   HCT 37.4      MCV 89   MCH 29.4   MCHC 32.9     Lipid Panel:   Recent Labs  Lab 03/11/17  1625   CHOL 254*   LDLCALC 177.6*   HDL 54   TRIG 112     Coagulation:   Recent Labs  Lab 03/11/17  1625   INR 1.0   APTT 23.1     Platelet Aggregation Study: No results for input(s): PLTAGG, PLTAGINTERP, PLTAGREGLACO, ADPPLTAGGREG in the last 168 hours.  Hgb A1C:   Recent Labs  Lab 03/11/17  1625   HGBA1C 6.1     TSH:   Recent Labs  Lab 03/11/17  1625   TSH 3.063       Diagnostic Results:  I have personally reviewed:   Findings:     CT Head OSH. Date: 03/11/17  -L frontal ICH    MRI Head. Date: 03/11/17  -3.6 x 3.1 cm T1 iso-to hyperintense and T2 hyperintense region in the left parietal lobe with associated blood fluid levels, compatible with subacute parenchymal hemorrhage.  -Diffusion restriction within this region is nonspecific and may either represent hemorrhagic infarction or the anisotropy of subacute blood components.  -No additional areas of parenchymal hemorrhage identified on the GRE sequences.      MRA Head/Neck. Date: 03/11/17  -No evidence of vascular abnormality to explain the parenchymal hemorrhage in the left parietal lobe.  -No evidence of aneurysm or stenosis involving the intracranial vessels

## 2017-03-12 NOTE — PROGRESS NOTES
Neuro Critical Care  Progress Note    Chief Complaint: ICH    Admit Date: 3/11/2017   Length of Stay:  LOS: 1 day     Interval      No acute events overnight. MRI MRA shows stable ICH and no vascular malformation/stenosis. Neuro exam stable.     Will obtain carotid US to complete ischemic stroke work up and have patient get repeat MRI with contrast to assess for underlying lesion.     Review of Systems:     Constitutional: no fever or chills  Eyes: no visual changes  ENT: no nasal congestion or sore throat  Respiratory: no cough or shortness of breath  Cardiovascular: no chest pain or palpitations  Gastrointestinal: no nausea or vomiting, no abdominal pain or change in bowel habits  Genitourinary: no hematuria or dysuria  Musculoskeletal: no arthralgias or myalgias  Neurological: no seizures or tremors  Endocrine: no heat or cold intolerance    Past Medical & Surgical History:     Past Medical History:   Diagnosis Date    Hypertension     Smokes 1 to 10 cigarettes per day 03/11/2017                                                                            History reviewed. No pertinent surgical history.     Home Medications:     Prior to Admission medications    Not on File       Allergies:     Review of patient's allergies indicates:  No Known Allergies     Social & Family History:     Social History     Social History    Marital status:      Spouse name: N/A    Number of children: N/A    Years of education: N/A     Social History Main Topics    Smoking status: Current Some Day Smoker     Years: 55.00     Types: Cigarettes    Smokeless tobacco: None    Alcohol use None    Drug use: None    Sexual activity: Not Asked     Other Topics Concern    None     Social History Narrative                                                                          History reviewed. No pertinent family history.     Objective:     Vital Signs (Most Recent):  Temp: 97.6 °F (36.4 °C) (03/12/17 0701)  Pulse: 73  (03/12/17 1000)  Resp: 15 (03/12/17 1000)  BP: 125/60 (03/12/17 1000)  SpO2: 96 % (03/12/17 1000) Vital Signs Range (Last 24H):  Temp:  [97.6 °F (36.4 °C)-98.3 °F (36.8 °C)]   Pulse:  []   Resp:  [12-36]   BP: (104-228)/()   SpO2:  [93 %-100 %]   Arterial Line BP: (111-152)/(34-60)      Body mass index is 28.5 kg/(m^2).     Hemodynamic Monitoring:         Physical Exam:     General: well developed, well nourished  Head: normocephalic, atraumatic  Eyes:  conjunctivae/corneas clear. PERRL.  Skin: Skin color, texture, turgor normal. No rashes or lesions  Neurologic: Mental status: Alert, oriented, thought content appropriate  Cranial nerves: II: visual field normal, II: pupils equal, round, reactive to light and accommodation, III,VII: ptosis none, III,IV,VI: extraocular muscles extra-ocular motions intact, V: facial light touch sensation normal bilaterally, VII: upper facial muscle function normal bilaterally, VII: lower facial muscle function reduced on the right, XII: tongue strength normal   Sensory: normal  Motor:3/5 proximally and distally on the RUE. 5/5 on the LUE, LLE. 4+/5 proximally on the RLE; 5/5 distally  Coordination: normal finger to nose on the left; limited by weakness on the right    Laboratory:  CBC:     Recent Labs  Lab 03/12/17 0328   WBC 9.33   RBC 4.19   HGB 12.3   HCT 37.4      MCV 89   MCH 29.4   MCHC 32.9     BMP:     Recent Labs  Lab 03/12/17 0328         K 3.6      CO2 22*   BUN 14   CREATININE 0.6   CALCIUM 8.6*   MG 1.5*     CMP:     Recent Labs  Lab 03/12/17 0328      CALCIUM 8.6*   ALBUMIN 3.3*   PROT 5.9*      K 3.6   CO2 22*      BUN 14   CREATININE 0.6   ALKPHOS 69   ALT 13   AST 17   BILITOT 1.3*     ABGs: No results for input(s): PH, PCO2, PO2, HCO3, POCSATURATED, BE in the last 168 hours.    Diagnostic Results:  CT head without contrast (3/11/2017)   Intraparenchymal hematoma in the left parietal lobe measuring 3 cm with  some surrounding edema and mass effect.    ASSESSMENT/PLAN:   Hospital Problems:  Active Hospital Problems    Diagnosis  POA    *Left-sided nontraumatic intracerebral hemorrhage [I61.9]  Yes    Vasogenic cerebral edema [G93.6]  Yes    Cigarette smoker [F17.210]  Yes    Hemiparesis, right [G81.91]  Yes    Essential hypertension [I10]  Yes      Resolved Hospital Problems    Diagnosis Date Resolved POA   No resolved problems to display.       Primary Diagnosis:  Left-sided nontraumatic intracerebral hemorrhage    Plan:  Neuro:   Non-traumatic left sided ICH  - no hydrocephalus or midline shift. +mass effect  - History on reported untreated HTN, possible hypertensive bleed but ?possible hemorrhagic stroke  - NSGY following  - , starting atorvastatin  -follow up sodium, goal eunatremia     Pulmonary:     CXR (3/11): no evidence of cardiopulmonary process  On RA, no active issues    Current smoker - consider nicotine patch     Cardiac:   ECHO pending  SBP goal <150- started lisinopril, off cardene   Hyperlipidemia, started lipitor 40    Renal:   I & O (Last 24H):    Intake/Output Summary (Last 24 hours) at 03/12/17 1102  Last data filed at 03/12/17 1000   Gross per 24 hour   Intake          2972.49 ml   Output                0 ml   Net          2972.49 ml       BUN/Cr 18/0.8    Infectious Disease:   No leukocytosis, afebrile    Hematology/Oncology:  H/H stable  Platelets: [id number]  PT/INR: 13.2/1.0     Endocrine:   A1c, tsh wnl  ,   Initiate statin  Educate patient on diet and compliance with statin     Fluids/Electrolytes/Nutrition/GI:   IVF: NS@ 75cc/hr  Hyponatremia       Prophylaxis:  Holding chemical ppx in the setting of acute bleed, SCD    Lines:  Peripheral IV           Maria Antonia Kuhn PA-C  Neurocritical Care  [facility]  Pager/Spectralink #[phone]

## 2017-03-12 NOTE — PT/OT/SLP EVAL
Occupational Therapy  Evaluation    Kaylin Neff   MRN: [id number]   Admitting Diagnosis: Left-sided nontraumatic intracerebral hemorrhage    OT Date of Treatment: 17   OT Start Time: 1533  OT Stop Time: 1604  OT Total Time (min): 31 min    Billable Minutes:  Evaluation 15  Self Care/Home Management 16    Diagnosis: Left-sided nontraumatic intracerebral hemorrhage       Past Medical History:   Diagnosis Date    Hypertension     Smokes 1 to 10 cigarettes per day 2017      History reviewed. No pertinent surgical history.    Referring physician: Mitchell Anne MD  Date referred to OT: 3/11/2017    General Precautions: Standard, aspiration, fall, NPO  Orthopedic Precautions: N/A    Do you have any cultural, spiritual, Hoahaoism conflicts, given your current situation?: None     Patient History:  Living Environment  Lives With: spouse  Living Arrangements: house  Living Environment Comment:  (Pt lives in 2 CHARLIE with 0 CHARLIE with in [place], [place]. PTA pt was I with mobility and ADLs, driving, not working. 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 Pt and spouse live on first floor of their home and they have 2 person tub.)  Equipment Currently Used at Home: none    Prior level of function:   Bed Mobility/Transfers: independent  Grooming: independent  Bathing: independent  Upper Body Dressing: independent  Lower Body Dressing: independent  Toileting: independent  Home Management Skills: independent  Homemaking Responsibilities: Yes     Dominant hand: right    Subjective:  Communicated with RN prior to session.  Pt agreeable to therapy.  Chief Complaint: Pt c/o her R hand not being able to do what she wants it to do.   Patient/Family stated goals: Pt agreeable to progressing towards independence with ADLs and functional mobility.     Pain Ratin/10     Pain Rating Post-Intervention: 0/10    Objective:  Patient found with: telemetry, blood pressure cuff, peripheral IV, arterial line, pulse ox  (continuous), SCD    Cognitive Exam:  Oriented to: Person and Situation  Follows Commands/attention: Follows one-step commands  Communication: clear/fluent difficulty with word finding  Memory:  Pt reports some trouble with memory-further assessment to be performed   Safety awareness/insight to disability: intact  Coping skills/emotional control: Appropriate to situation    Visual/perceptual:  Intact    Physical Exam:  Postural examination/scapula alignment: No postural abnormalities identified  Skin integrity: Visible skin intact  Edema: slight swelling in R hand    Sensation:   Diminished sensation R UE    Upper Extremity Range of Motion:  Right Upper Extremity: Deficits: ~60 degrees shouder flexion with drift, elbow flex WFL, finger flexion limited  Left Upper Extremity: WFL    Upper Extremity Strength:  Right Upper Extremity: WFL  Left Upper Extremity: Deficits: 3+/5   Strength: WFL    Fine motor coordination:   Intact    Gross motor coordination: WFL    Functional Mobility:  Bed Mobility:  Rolling/Turning to Left: Minimum assistance  Scooting/Bridging: Maximum Assistance  Supine to Sit: Moderate Assistance    Transfers:  Sit <> Stand Assistance: Minimum Assistance  Sit <> Stand Assistive Device: No Assistive Device  Bed <> Chair Technique: Stand Pivot  Bed <> Chair Transfer Assistance: Minimum Assistance    Functional Ambulation: stand pivot assessed only    Activities of Daily Living:  Feeding Level of Assistance: Maximum assistance ( assisted pt in self feeding earlier today due to pt having difficulty)  Feeding adaptive equipment: none  pt may benefit from using scoop dish  UE Dressing Level of Assistance: Maximum assistance  UE adaptive equipment: none  LE Dressing Level of Assistance: Maximum assistance (max A with donning, pt able to doff L sock with SBA)  LE adaptive equipment: none  Grooming Position: bedside chair, Seated  Grooming Level of Assistance: Moderate assistance (pt educated on one  handed techniques as well as using R hand as a stabilizer)  Toileting Where Assessed: Bedside commode  Toileting Level of Assistance: Total assistance (for cleaning, min A for transfer)    Balance:   Static Sit: FAIR+: Able to take MINIMAL challenges from all directions  Dynamic Sit: GOOD-: Maintains balance through MODERATE excursions of active trunk movement,     Static Stand: POOR+: Needs MINIMAL assist to maintain  Dynamic stand: POOR: N/A    Therapeutic Activities and Exercises:  Pt performed bed mobility, EOB strength testing, transferred to bedside chair towards R side via stand pivot transfer with min A. Pt sat in chair and changed socks with assist level noted above. Pt and family educated on compensatory techniques such as using R hand as a stabilizer in addition to one handed techniques.  Pt able to perform task but lacks enough  strength in R hand to use as a stabilizer for small object such as toothbrush.  Recommended family provide more tactile cues rather than verbal cues as it appeared pt became overwhelmed with processing the instructions given to her at the rate they were given.  Demonstrated how to give tactile cues to facilitate more efficient motor planning during oral care task.      Modified [place] Score: 4/6 Moderately severe disability; unable to walk without assistance and unable to attend to own bodily needs without assistance    AM-PAC 6 CLICK ADL  How much help from another person does this patient currently need?  1 = Unable, Total/Dependent Assistance  2 = A lot, Maximum/Moderate Assistance  3 = A little, Minimum/Contact Guard/Supervision  4 = None, Modified [place]/Independent    Putting on and taking off regular lower body clothing? : 2  Bathing (including washing, rinsing, drying)?: 2  Toileting, which includes using toilet, bedpan, or urinal? : 2  Putting on and taking off regular upper body clothing?: 2  Taking care of personal grooming such as brushing teeth?: 2  Eating  "meals?: 2  Total Score: 12    AM-PAC Raw Score CMS "G-Code Modifier Level of Impairment Assistance   6 % Total / Unable   7 - 9 CM 80 - 100% Maximal Assist   10 - 14 CL 60 - 80% Moderate Assist   15 - 19 CK 40 - 60% Moderate Assist   20 - 22 CJ 20 - 40% Minimal Assist   23 CI 1-20% SBA / CGA   24 CH 0% Independent/ Mod I       Patient left up in chair with all lines intact and call button in reach    Assessment:  Kaylin Neff is a 71 y.o. female with a medical diagnosis of Left-sided nontraumatic intracerebral hemorrhage and presents with decline in ADLs and functional mobility.  She is motivated to participate and eager to improve.  Pt is min A with transfers and has been able to get up to bedside commode with nursing as needed.  Her main limitation physically is gross motor incoordination and weakness of R (dominant) UE.  Further memory and functional problem solving assessment is appropriate as pt reported this has been affected. Pt would benefit from skilled OT services in order to maximize independence with ADLs and facilitate safe discharge.    Rehab identified problem list/impairments: Rehab identified problem list/impairments: weakness, impaired endurance, impaired sensation, impaired self care skills, impaired functional mobilty, gait instability, impaired balance, impaired cognition, decreased coordination, decreased upper extremity function, decreased lower extremity function, impaired fine motor, impaired coordination    Rehab potential is good.    Activity tolerance: Good    Discharge recommendations: Discharge Facility/Level Of Care Needs: rehabilitation facility     Barriers to discharge: Barriers to Discharge: Inaccessible home environment, Decreased caregiver support    Equipment recommendations: none     GOALS:   Occupational Therapy Goals        Problem: Occupational Therapy Goal    Goal Priority Disciplines Outcome Interventions   Occupational Therapy Goal     OT, PT/OT     Description: "  Goals to be met by: 3/22/2017    Patient will increase functional independence with ADLs by performing:    Feeding with Set-up Assistance.  UE Dressing with Stand-by Assistance.  LE Dressing with moderate assistance.  Grooming while seated with Minimal Assistance.  Toileting from bedside commode with Minimal Assistance for hygiene and clothing management.   Bathing from  edge of bed with Moderate Assistance.  Sitting at edge of bed x20 minutes with Stand-by Assistance.  Supine to sit with contact guard Assistance.  Stand pivot transfers with Contact Guard Assistance.  Toilet transfer to bedside commode with Minimal Assistance.                PLAN:  Patient to be seen 6 x/week to address the above listed problems via self-care/home management, therapeutic activities, therapeutic exercises, neuromuscular re-education  Plan of Care expires:  4/11/2017  Plan of Care reviewed with: patient, spouse         BESSIE Reardon  03/12/2017

## 2017-03-12 NOTE — PROGRESS NOTES
Progress Note  Neurosurgery    Admit Date: 3/11/2017  Post-operative Day:    Hospital Day: 2    SUBJECTIVE:     Kaylin Neff is a 71 y.o. female with left parietal ICH.  ICH of 0.  Follow-up For:  * No surgery found *    NAEON  Scheduled Meds:   sodium chloride 0.9%  3 mL Intravenous Q8H     Continuous Infusions:   sodium chloride 0.9% 75 mL/hr at 03/12/17 0500    nicardipine 5 mg/hr (03/12/17 0628)     PRN Meds:labetalol, magnesium sulfate IVPB, magnesium sulfate IVPB, ondansetron, potassium chloride 10%, potassium chloride 10%, potassium chloride 10%, sodium phosphate IVPB, sodium phosphate IVPB, sodium phosphate IVPB    Review of patient's allergies indicates:  No Known Allergies    OBJECTIVE:     Vital Signs (Most Recent)  Temp: 98.3 °F (36.8 °C) (03/12/17 0300)  Pulse: 85 (03/12/17 0600)  Resp: 20 (03/12/17 0600)  BP: (!) 117/57 (03/12/17 0600)  SpO2: 99 % (03/12/17 0600)    Vital Signs Range (Last 24H):  Temp:  [97.7 °F (36.5 °C)-98.3 °F (36.8 °C)]   Pulse:  []   Resp:  [12-36]   BP: (104-235)/()   SpO2:  [93 %-100 %]   Arterial Line BP: (111-152)/(46-60)     I & O (Last 24H):  Intake/Output Summary (Last 24 hours) at 03/12/17 0711  Last data filed at 03/12/17 0628   Gross per 24 hour   Intake          2841.66 ml   Output                0 ml   Net          2841.66 ml     Physical Exam:  AAOX3, mild word finding difficulty  PERRL, EOMI, face symm, tongue midline  3/5 in RUE, 4/5 in RLE, 5/5 on left  SILT  Reflexes symm  +drift on right, visual fields grossly intact    Lines/Drains:       Peripheral IV - Single Lumen 03/11/17 1046 Right Antecubital (Active)   Site Assessment No redness;No swelling;Intact;Clean;Dry 3/12/2017  3:00 AM   Line Status Infusing 3/12/2017  3:00 AM   Dressing Status Clean;Dry;Intact 3/12/2017  3:00 AM   Dressing Intervention Dressing reinforced 3/12/2017  3:00 AM   Dressing Change Due 03/15/17 3/12/2017  3:00 AM   Site Change Due 03/15/17 3/11/2017  7:05 PM   Reason  Not Rotated Not due 3/12/2017  3:00 AM   Number of days:0            Peripheral IV - Single Lumen 03/11/17 1114 Left Antecubital (Active)   Site Assessment Clean;Dry;Intact;No redness;No swelling 3/12/2017  3:00 AM   Line Status Saline locked 3/12/2017  3:00 AM   Dressing Status Clean;Dry;Intact 3/12/2017  3:00 AM   Dressing Intervention Dressing reinforced 3/12/2017  3:00 AM   Dressing Change Due 03/15/17 3/12/2017  3:00 AM   Site Change Due 03/15/17 3/11/2017  7:05 PM   Reason Not Rotated Not due 3/12/2017  3:00 AM   Number of days:0            Arterial Line 03/11/17 2040 Right Radial (Active)   Site Assessment Clean;Dry;Intact;No redness;No swelling 3/12/2017  3:00 AM   Line Status Pulsatile blood flow 3/12/2017  3:00 AM   Art Line Waveform Appropriate;Square wave test performed 3/12/2017  3:00 AM   Arterial Line Interventions Zeroed and calibrated;Leveled;Connections checked and tightened;Flushed per protocol 3/12/2017  3:00 AM   Color/Movement/Sensation Capillary refill less than 3 sec 3/12/2017  3:00 AM   Dressing Type Transparent 3/12/2017  3:00 AM   Dressing Status Biopatch in place;Clean;Dry;Intact 3/12/2017  3:00 AM   Dressing Intervention New dressing 3/12/2017  3:00 AM   Dressing Change Due 03/18/17 3/12/2017  3:00 AM   Number of days:0       Wound/Incision:  na    Laboratory:  CBC:   Recent Labs  Lab 03/12/17  0328   WBC 9.33   RBC 4.19   HGB 12.3   HCT 37.4      MCV 89   MCH 29.4   MCHC 32.9     CMP:   Recent Labs  Lab 03/12/17  0328      CALCIUM 8.6*   ALBUMIN 3.3*   PROT 5.9*      K 3.6   CO2 22*      BUN 14   CREATININE 0.6   ALKPHOS 69   ALT 13   AST 17   BILITOT 1.3*     Coagulation:   Recent Labs  Lab 03/11/17  1625   INR 1.0   APTT 23.1     Cardiac markers:   Recent Labs  Lab 03/11/17  1047   TROPONINI <0.012     No results for input(s): COLORU, CLARITYU, SPECGRAV, PHUR, PROTEINUA, GLUCOSEU, BILIRUBINCON, BLOODU, WBCU, RBCU, BACTERIA, MUCUS, NITRITE, LEUKOCYTESUR,  UROBILINOGEN, HYALINECASTS in the last 168 hours.      Diagnostic Results:  MRI/MRA w/o contrast reviewed.    ASSESSMENT/PLAN:     Assessment: 71 F with left parietal ICH.  ICH of 0.  -Pt neuro stable  -q 1 hr neuro checks  -SBP less than 150  -Goal eunatremia  -Will need repeat MRI brain w/ contrast in few weeks to reassess for underlying lesion as source of hemorrhage  -Maximal medical mgmt per [facility].

## 2017-03-12 NOTE — PLAN OF CARE
Problem: Occupational Therapy Goal  Goal: Occupational Therapy Goal  Goals to be met by: 3/22/2017    Patient will increase functional independence with ADLs by performing:    Feeding with Set-up Assistance.  UE Dressing with Stand-by Assistance.  LE Dressing with moderate assistance.  Grooming while seated with Minimal Assistance.  Toileting from bedside commode with Minimal Assistance for hygiene and clothing management.   Bathing from edge of bed with Moderate Assistance.  Sitting at edge of bed x20 minutes with Stand-by Assistance.  Supine to sit with contact guard Assistance.  Stand pivot transfers with Contact Guard Assistance.  Toilet transfer to bedside commode with Minimal Assistance.  Evaluation completed.  OT plan of care developed and reviewed with patient.

## 2017-03-12 NOTE — PLAN OF CARE
Problem: SLP Goal  Goal: SLP Goal  Speech Language Pathology Goals  Goals expected to be met by 3/20/2017      1. Pt will tolerate regular diet with thin liquids with no overt s/s of aspiration  2. Pt will participate in speech, language and cognitive evaluation to rule out cognitive linguistic deficits.           Recommendations:  · Regular diet  · Thin liquids  · Strict Aspiration Precautions        Rahel Sears, CCC-SLP  Speech Language Pathologist  [phone]  3/12/2017

## 2017-03-12 NOTE — PROCEDURES
"Kaylin Neff is a 71 y.o. female patient.    Temp: 97.6 °F (36.4 °C) (17 0701)  Pulse: 63 (17 1300)  Resp: 16 (17 1300)  BP: (!) 115/58 (17 1300)  SpO2: 96 % (17 1300)  Weight: 80.6 kg (177 lb 11.1 oz) (17 07)  Height: 5' 6.5" (168.9 cm) (17 1535)       Arterial Line  Date/Time: 3/11/2017 1:34 PM  Performed by: BENJA SMITH  Authorized by: BENJA SMITH   Consent Done: Yes  Risks and benefits: risks, benefits and alternatives were discussed  Consent given by: patient  Patient understanding: patient states understanding of the procedure being performed  Patient consent: the patient's understanding of the procedure matches consent given  Procedure consent: procedure consent matches procedure scheduled  Relevant documents: relevant documents present and verified  Test results: test results available and properly labeled  Site marked: the operative site was marked  Imaging studies: imaging studies available  Patient identity confirmed: , MRN and name  Indications: multiple ABGs and hemodynamic monitoring  Location: right radial  Warner's test normal: yes  Needle gauge: 20  Number of attempts: 1  Complications: No          Benja Smith  3/12/2017  "

## 2017-03-12 NOTE — PT/OT/SLP EVAL
"Physical Therapy  Evaluation/Treatment    Kaylin Neff   MRN: [id number]   Admitting Diagnosis: Left-sided nontraumatic intracerebral hemorrhage    PT Received On: 17  PT Start Time: 48     PT Stop Time: 934    PT Total Time (min): 46 min       Billable Minutes:  Evaluation 15, Therapeutic Activity 20 and Neuromuscular Re-education 10    Diagnosis: Left-sided nontraumatic intracerebral hemorrhage    Past Medical History:   Diagnosis Date    Hypertension     Smokes 1 to 10 cigarettes per day 2017      History reviewed. No pertinent surgical history.    Referring physician: Dayana  Date referred to PT: 3/11/2017    General Precautions: Standard, aspiration, fall, NPO      Do you have any cultural, spiritual, Hinduism conflicts, given your current situation?: None noted    Patient History:  Lives With: spouse  Living Arrangements: house  Living Environment Comment: Pt lives in 2STE with 0STE with spouse in [place], [place]. PTA pt was (I) with mobility and ADLs; driving, not working. 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 Pt and spouse live on first floor of their home. Pt's spouse is retired.  Equipment Currently Used at Home: none  DME owned (not currently used): none    Previous Level of Function:  Ambulation Skills: independent  Transfer Skills: independent  ADL Skills: independent  Work/Leisure Activity: independent    Subjective:  Communicated with RN (Olga) prior to session.    Chief Complaint: "I have to go to the bathroom. Can we do that first thing?"  Patient goals: "I don't necessarily have to do woodworking again, but I would like to be independent."    Pain Ratin/10   Pain Rating Post-Intervention: 0/10    Objective:   Patient found with: telemetry, blood pressure cuff, peripheral IV, arterial line, pulse ox (continuous), SCD     Cognitive Exam:  Oriented to: Person, Place and Situation    Follows Commands/attention: Follows one-step commands  Communication: " expressive aphasia  Safety awareness/insight to disability: intact    Physical Exam:  Postural examination/scapula alignment: Rounded shoulder, Head forward and Posterior pelvic tilt    Skin integrity: Visible skin intact and Thin  Edema: None noted    Sensation: ; upon evaluation and per pt report, no changes in sensation to B LE.    Lower Extremity Range of Motion:  Right Lower Extremity: WFL  Left Lower Extremity: WFL    Lower Extremity Strength: No focal weakness noted; pt with grossly 4/5 B LE strength  Right Lower Extremity: WNL  Left Lower Extremity: WNL    Gross motor coordination: WFL    Functional Mobility:  Bed Mobility:  Rolling/Turning to Left: Minimum assistance  Rolling/Turning Right: Minimum assistance  Scooting/Bridging: Minimum Assistance  Supine to Sit: Minimum Assistance  Sit to Supine: Moderate Assistance (for mnmgnt of B LE to bed level)    Transfers:  Sit <> Stand Assistance: Contact Guard Assistance (x3 trials for changing of sheets; pads)  Sit <> Stand Assistive Device: No Assistive Device    Gait:   Gait Distance: x4 side steps toward HOB with appropriate WS and sequencing  Assistance 1: Minimum assistance  Gait Assistive Device: No device  Gait Pattern: 2-point gait  Gait Deviation(s): decreased dileep, decreased weight-shifting ability    Therapeutic Activities and Exercises:  PT entered pt's room to find pt in need of bedpan; PT instructed pt on use of bedpan to avoid injury to R UE. Pt and pt's spouse agreeable to PT's assist with cleaning; PT instructed pt on appropriate bridging with HOB near flat to avoid neck injury.    Pt came to EOB sitting (see below). While sitting EOB, pt's arterial line filled with blood; RN alerted. Upon RN entry, art line leaking onto gown, sheets. Pt req frequent re-direction to maintain midline sitting with R UE on towel 2/2 extensor tone noted and attempts to prop on R UE on bed level. RN present to assist with changing sheets while PT assist pt to  standing.     Upon return to supine, PT provided education on recommendations, mobility needs, and progress with PT services during hospital stay. Questions/concerns addressed within PT scope of practice.     R UE supported on pillow and RN aware of pt's functional status/mobility needs. States she will orders bedside commode.    Balance:   Static Sit: FAIR+: Able to take MINIMAL challenges from all directions  Dynamic Sit: FAIR+: Maintains balance through MINIMAL excursions of active trunk motion  Static Stand: FAIR: Maintains without assist but unable to take challenges  Dynamic stand: FAIR: Needs CONTACT GUARD during gait     Pt tolerated EOB sitting x20 minutes this date with SBA-mod A for sitting balance in midline; Pt demo poor attention to the R and req frequent redirection to task and for upright posture. Pt demo poor dual tasking with introduction of cognitive task with poor carryover to repeated tasks.     AM-PAC 6 CLICK MOBILITY  How much help from another person does this patient currently need?   1 = Unable, Total/Dependent Assistance  2 = A lot, Maximum/Moderate Assistance  3 = A little, Minimum/Contact Guard/Supervision  4 = None, Modified [place]/Independent    Turning over in bed (including adjusting bedclothes, sheets and blankets)?: 3  Sitting down on and standing up from a chair with arms (e.g., wheelchair, bedside commode, etc.): 3  Moving from lying on back to sitting on the side of the bed?: 3  Moving to and from a bed to a chair (including a wheelchair)?: 3  Need to walk in hospital room?: 2  Climbing 3-5 steps with a railing?: 2  Total Score: 16     AM-PAC Raw Score CMS G-Code Modifier Level of Impairment Assistance   6 % Total / Unable   7 - 9 CM 80 - 100% Maximal Assist   10 - 14 CL 60 - 80% Moderate Assist   15 - 19 CK 40 - 60% Moderate Assist   20 - 22 CJ 20 - 40% Minimal Assist   23 CI 1-20% SBA / CGA   24 CH 0% Independent/ Mod I     Patient left HOB elevated with all  lines intact, call button in reach and RN notified.    Assessment:   Kaylin Neff is a 71 y.o. female with a medical diagnosis of Left-sided nontraumatic intracerebral hemorrhage. PTA pt was (I) with mobility and ADLs; . Upon evaluation, pt presents with R UE hemiparesis and incoordination, impaired functional mobility, and decreased activity tolerance 2/2 poor R sided awareness. Pt is a high fall risk based on the below impairments. Pt's role as (I) caregiver for self has been affected; Pt is not at her baseline and will benefit from Rehab prior to D/C to return to (I) PLOF. Patient will benefit from continued PT services to address:    Rehab identified problem list/impairments: Rehab identified problem list/impairments: weakness, impaired endurance, impaired self care skills, impaired functional mobilty, gait instability, impaired balance, impaired cognition, decreased coordination, decreased upper extremity function, decreased lower extremity function, decreased safety awareness, abnormal tone    Rehab potential is good.    Activity tolerance: Good    Discharge recommendations: Discharge Facility/Level Of Care Needs: rehabilitation facility     Barriers to discharge: Barriers to Discharge: Inaccessible home environment, Decreased caregiver support    Equipment recommendations: Equipment Needed After Discharge: none     GOALS:   Physical Therapy Goals        Problem: Physical Therapy Goal    Goal Priority Disciplines Outcome Goal Variances Interventions   Physical Therapy Goal     PT/OT, PT Ongoing (interventions implemented as appropriate)     Description:  Goals to be met by: 3/20/2017     Patient will increase functional independence with mobility by performin. Supine to sit with Stand-by Assistance  2. Sit to supine with Stand-by Assistance  3. Sit to stand transfer with Stand-by Assistance  4. Bed to chair transfer with Contact Guard Assistance using AD or No AD  5. Gait x50 feet with Stand-by  Assistance using AD or No AD  6. Sitting at edge of bed x10 minutes with Supervision  7. Stand for x10 minutes with Stand-by Assistance using Ad or NO AD  8. Lower extremity exercise program x15 reps per handout, with supervision              PLAN:    Patient to be seen 6 x/week to address the above listed problems via gait training, therapeutic activities, therapeutic exercises, neuromuscular re-education  Plan of Care expires: 04/11/17  Plan of Care reviewed with: patient, spouse     Rocio Espinosa, PT, DPT  [phone]  3/12/2017

## 2017-03-12 NOTE — ASSESSMENT & PLAN NOTE
Kaylin Neff is a 71 y.o. female with PMHx of HTN with L frontal ICH. DDx includes mass/tumor, bleeding infarct, hypertensive bleed (unusual locations) and amyloid angiopathy. Patient seen by neurosurgery who recommended repeat imaging in 2 weeks.    Antithrombotics for secondary stroke prevention: None: Reason:  Hemorrhagic Stroke, Statins for secondary stroke prevention and hyperlipidemia, if present: Atorvastatin- 40 mg oral daily, Aggressive risk factor modification: Hypertension and Smoking, Rehab Efforts: Physical Therapy, Occupational Therapy and Speech and Language Pathology, Diagnostics: Ordered/Pending Trans-thoracic cardiac echo to assess cardiac function/status, VTE Prophylaxis: SCDs, BP Parameters: SBP <140, Nursing Orders: Neuro checks- Q1H, Swallowing evaluation by Nursing, Seizure precautions, Out of bed BID, Avoid Landaverde catheter, Pneumatic compression device, Stroke Education, Blood glucose target 100-130, Up with assistance and IV Fluids: per primary team

## 2017-03-12 NOTE — PLAN OF CARE
Problem: Patient Care Overview  Goal: Plan of Care Review  Outcome: Ongoing (interventions implemented as appropriate)  POC reviewed with pt and spouse at 0500. Pt and spouse verbalized understanding. No acute events overnight. Pt progressing toward goals. Will continue to monitor. See flowsheets for full assessment and VS info

## 2017-03-12 NOTE — PLAN OF CARE
Problem: Patient Care Overview  Goal: Plan of Care Review  Outcome: Ongoing (interventions implemented as appropriate)  POC reviewed with pt and family at 1500. Pt verbalized understanding. Questions and concerns addressed. No acute events today. Pt progressing toward goals. Will continue to monitor. See flowsheets for full assessment and VS info.

## 2017-03-12 NOTE — PT/OT/SLP EVAL
"Speech Language Pathology  Evaluation    Kaylin Neff   MRN: [id number]   Admitting Diagnosis: Left-sided nontraumatic intracerebral hemorrhage    Diet recommendations: Solid Diet Level: Regular  Liquid Diet Level: Thin No straws, HOB to 90 degrees, Small bites/sips, Alternating bites/sips, 1 bite/sip at a time, Remain upright 30 minutes post meal, Meds whole buried in puree, Eliminate distractions, Avoid talking while eating and Assistance with meals    SLP Treatment Date: 10/01/17  Speech Start Time: 1000     Speech Stop Time: 1013     Speech Total (min): 13 min       TREATMENT BILLABLE MINUTES:  Eval Swallow and Oral Function 13    Diagnosis: Left-sided nontraumatic intracerebral hemorrhage      Past Medical History:   Diagnosis Date    Hypertension     Smokes 1 to 10 cigarettes per day 2017     History reviewed. No pertinent surgical history.    Has the patient been evaluated by SLP for swallowing? : Yes  Keep patient NPO?: No   General Precautions: Standard, aspiration, fall, NPO          Social Hx: Patient lives with      Prior diet:Reg/thin    Occupational/hobbies/homemaking: Retired [employer], enjoys gardening.    Subjective:  Awake and alert in NAD    Patient goals: "Fix my right arm".    Pain Ratin/10              Pain Rating Post-Intervention: 0/10    Objective:   Patient found with: blood pressure cuff, arterial line, SCD, peripheral IV, telemetry    Oral Musculature Evaluation  Oral Musculature: right weakness (mild)  Dentition: present and adequate  Mucosal Quality: good  Mandibular Strength and Mobility: WFL  Oral Labial Strength and Mobility: functional retraction, functional seal, functional pursing, functional coordination  Lingual Strength and Mobility: functional protrusion, functional anterior elevation, functional lateral movement, functional strength  Velar Elevation: WFL  Buccal Strength and Mobility: WFL  Volitional Swallow: adequate laryngeal excursion noted to finger " palpation  Voice Prior to PO Intake: clear     Bedside Swallow Eval:  Consistencies Assessed: Thin liquids (4 oz of water), Puree 5 tsp of applesauce) and Solids 4 pieces of cracker  Oral Phase: WFL  Pharyngeal Phase: no overt clinical  signs/symptoms of aspiration and no overt clinical signs/symptoms of pharyngeal dysphagia    Additional Treatment:      FIM:  Social Interaction: 7 Complete [place]--The patient interacts appropriately with staff, other patients, and family members (e.g., controls temper, accepts criticism, is aware that words and actions have an impact on others), and does not require medication for                            Assessment:  Kaylin Neff is a 71 y.o. female with a medical diagnosis of Left-sided nontraumatic intracerebral hemorrhage and presents with no significant s/s of dysphagia.    Do you have any cultural, spiritual, Mosque conflicts, given your current situation?: None reported     Discharge recommendations: Discharge Facility/Level Of Care Needs:  (TBD)     Goals:   SLP Goals        Problem: SLP Goal    Goal Priority Disciplines Outcome   SLP Goal     SLP    Description:  Speech Language Pathology Goals  Goals expected to be met by 3/20/2017      1. Pt will tolerate regular diet with thin liquids with no overt s/s of aspiration  2. Pt will participate in speech, language and cognitive evaluation to rule out cognitive linguistic deficits.                 Plan:   Patient to be seen Therapy Frequency: 5 x/week   Plan of Care expires: 04/10/17  Plan of Care reviewed with: patient, spouse  SLP Follow-up?: Yes              Jagruti Alford CCC-SLP  03/12/2017

## 2017-03-12 NOTE — PROGRESS NOTES
[facility]  Vascular Neurology  Comprehensive Stroke Center  Progress Note      Neurologic Chief Complaint: L frontal ICH    Subjective:     Interval History: Patient is seen for follow-up neurological assessment and treatment recommendations: YOSELYN, patient has no complaints this morning    HPI, Past Medical, Family, and Social History remains the same as documented in the initial encounter.     Review of Systems   Constitutional: Negative for chills and fever.   HENT: Negative for drooling.    Eyes: Negative for redness.   Respiratory: Negative for cough and shortness of breath.    Cardiovascular: Negative for chest pain and palpitations.   Gastrointestinal: Negative for vomiting.   Musculoskeletal: Negative for joint swelling.   Skin: Negative for rash.   Neurological: Positive for facial asymmetry, speech difficulty and weakness. Negative for dizziness, numbness and headaches.   Psychiatric/Behavioral: Negative for agitation and behavioral problems.     Scheduled Meds:   atorvastatin  40 mg Oral Daily    lisinopril  10 mg Oral Daily    sodium chloride 0.9%  3 mL Intravenous Q8H     Continuous Infusions:   sodium chloride 0.9% 75 mL/hr at 03/12/17 0500    nicardipine 5 mg/hr (03/12/17 1131)     PRN Meds:labetalol, magnesium sulfate IVPB, magnesium sulfate IVPB, ondansetron, potassium chloride 10%, potassium chloride 10%, potassium chloride 10%, sodium phosphate IVPB, sodium phosphate IVPB, sodium phosphate IVPB    Objective:     Vital Signs (Most Recent):  Temp: 97.6 °F (36.4 °C) (03/12/17 0701)  Pulse: 73 (03/12/17 1000)  Resp: 15 (03/12/17 1000)  BP: (!) 124/55 (03/12/17 1130)  SpO2: 96 % (03/12/17 1000)       Vital Signs Range (Last 24H):  Temp:  [97.6 °F (36.4 °C)-98.3 °F (36.8 °C)]   Pulse:  []   Resp:  [12-36]   BP: (104-156)/()   SpO2:  [93 %-100 %]   Arterial Line BP: (111-152)/(34-60)        Physical Exam   Constitutional: She is oriented to person, place, and time.  She appears well-developed and well-nourished. No distress.   HENT:   Head: Normocephalic and atraumatic.   Eyes: EOM are normal. Pupils are equal, round, and reactive to light.   Neck: Normal range of motion.   Cardiovascular: Normal rate.    Pulmonary/Chest: Effort normal. No respiratory distress.   Abdominal: Soft. She exhibits no distension.   Musculoskeletal: Normal range of motion.   Neurological: She is alert and oriented to person, place, and time. GCS eye subscore is 4 - spontaneous. GCS verbal subscore is 5 - oriented. GCS motor subscore is 6 - obeys commands.   Skin: Skin is warm and dry.   Psychiatric: She has a normal mood and affect. Her behavior is normal.   Vitals reviewed.      Neurological Exam:   LOC: alert and follows requests  Language: Expressive aphasia  Speech: Dysarthria  Orientation: Person, Place, Time  Memory: Recent memory intact, Remote memory intact, Age correct, Month correct  Visual Fields (CN II): Full  EOM (CN III, IV, VI): Full/intact  Oculocephalics: normal  Pupils (CN III, IV, VI): PERRL  Facial Sensation (CN V): Symmetric  Facial Movement (CN VII): lower weakness right lower  Hearing (CN VIII): intact bilaterally  Motor*: Arm Left:  Normal (5/5), Leg Left:   Normal (5/5), Arm Right:   Paretic:  4/5, Leg Right:   Paretic:  4/5  Cerebellar*: Normal limb, Normal gait  , Normal stance  Sensation: intact to light touch, temperature and vibration  Tone: Arm-Left: normal; Leg-Left: normal; Arm-Right: normal; Leg-Right: normal    NIH Stroke Scale:    Level of Consciousness: 0 - alert  LOC Questions: 0 - answers both correctly  LOC Commands: 0 - performs both correctly  Best Gaze: 0 - normal  Visual: 0 - no visual loss  Facial Palsy: 1 - minor  Motor Left Arm: 0 - no drift  Motor Right Arm: 0 - no drift  Motor Left Le - drift  Motor Right Le - drift  Limb Ataxia: 0 - absent  Sensory: 0 - normal  Best Language: 1 - mild to moderate aphasia  Dysarthria: 1 - mild to moderate  dysarthria  Extinction and Inattention: 0 - no neglect  NIH Stroke Scale Total: 5  [place] Coma Scale:  Best Eye Response: 4 - spontaneous  Best Motor Response: 6 - obeys commands  Best Verbal Response: 5 - oriented        Laboratory:  CMP:   Recent Labs  Lab 03/12/17  0328   CALCIUM 8.6*   ALBUMIN 3.3*   PROT 5.9*      K 3.6   CO2 22*      BUN 14   CREATININE 0.6   ALKPHOS 69   ALT 13   AST 17   BILITOT 1.3*     CBC:   Recent Labs  Lab 03/12/17  0328   WBC 9.33   RBC 4.19   HGB 12.3   HCT 37.4      MCV 89   MCH 29.4   MCHC 32.9     Lipid Panel:   Recent Labs  Lab 03/11/17  1625   CHOL 254*   LDLCALC 177.6*   HDL 54   TRIG 112     Coagulation:   Recent Labs  Lab 03/11/17  1625   INR 1.0   APTT 23.1     Platelet Aggregation Study: No results for input(s): PLTAGG, PLTAGINTERP, PLTAGREGLACO, ADPPLTAGGREG in the last 168 hours.  Hgb A1C:   Recent Labs  Lab 03/11/17  1625   HGBA1C 6.1     TSH:   Recent Labs  Lab 03/11/17  1625   TSH 3.063       Diagnostic Results:  I have personally reviewed:   Findings:     CT Head OSH. Date: 03/11/17  -L frontal ICH    MRI Head. Date: 03/11/17  -3.6 x 3.1 cm T1 iso-to hyperintense and T2 hyperintense region in the left parietal lobe with associated blood fluid levels, compatible with subacute parenchymal hemorrhage.  -Diffusion restriction within this region is nonspecific and may either represent hemorrhagic infarction or the anisotropy of subacute blood components.  -No additional areas of parenchymal hemorrhage identified on the GRE sequences.      MRA Head/Neck. Date: 03/11/17  -No evidence of vascular abnormality to explain the parenchymal hemorrhage in the left parietal lobe.  -No evidence of aneurysm or stenosis involving the intracranial vessels    Assessment/Plan:     Kaylin Neff is a 71 y.o. Female with L frontal ICH transferred from OSH for further care. Patient presents with RSW and speech difficulties with an NIH of 5. She was admitted to neuro ICU  for further care. CT head confirmed L frontal ICH. MRA was negative for vascular abnormalities.     3/12/17 Patient has no complaints, has drift on right (arm>leg) with aphasia and R facial droop; remains on cardene      * Left-sided nontraumatic intracerebral hemorrhage  Kaylin Neff is a 71 y.o. female with PMHx of HTN with L frontal ICH. DDx includes mass/tumor, bleeding infarct, hypertensive bleed (unusual locations) and amyloid angiopathy. Patient seen by neurosurgery who recommended repeat imaging in 2 weeks.    Antithrombotics for secondary stroke prevention: None: Reason:  Hemorrhagic Stroke, Statins for secondary stroke prevention and hyperlipidemia, if present: Atorvastatin- 40 mg oral daily, Aggressive risk factor modification: Hypertension and Smoking, Rehab Efforts: Physical Therapy, Occupational Therapy and Speech and Language Pathology, Diagnostics: Ordered/Pending Trans-thoracic cardiac echo to assess cardiac function/status, VTE Prophylaxis: SCDs, BP Parameters: SBP <140, Nursing Orders: Neuro checks- Q1H, Swallowing evaluation by Nursing, Seizure precautions, Out of bed BID, Avoid Landaverde catheter, Pneumatic compression device, Stroke Education, Blood glucose target 100-130, Up with assistance and IV Fluids: per primary team                                Hemiparesis, right  Patient with 4/5 strength in RUE and RLE  PT and OT to evaluate and treat    Essential hypertension  Stroke risk factor  SBP <140      Vasogenic cerebral edema  Evident on imaging  2/2 ICH    Cigarette smoker  Stroke risk factor   on cessation    Hyperlipidemia  Stroke risk factor  .6  Recommend starting atorvastatin 40          Myesha Abad PA-C  Comprehensive Stroke Center  Department of Vascular Neurology   [facility]

## 2017-03-12 NOTE — PLAN OF CARE
Problem: Patient Care Overview  Goal: Plan of Care Review  Outcome: Ongoing (interventions implemented as appropriate)  POC reviewed with pt and family at 1400. Pt verbalized understanding. Questions and concerns addressed. Pt up to BSCC and BSC today. Obed well. Pt progressing toward goals. Will continue to monitor. See flowsheets for full assessment and VS info.

## 2017-03-12 NOTE — CONSULTS
[facility]  Adult Nutrition  Consult Note    SUMMARY     Recommendations    Recommendation/Intervention:   1. Once medically able, initiate Cardiac diet with consistency per SLP.   2. If PO intake <50%, recommend Boost Plus TID. Will monitor.   Goals: Pt to receive nutrition within 48hrs.   Nutrition Goal Status: new  Communication of RD Recs: reviewed with RN    Reason for Assessment    Reason for Assessment: physician consult  Diagnosis: other (see comments) (ICH)  Relevent Medical History: HTN   Nutrition Discharge Planning: Adequate PO intake for optimal nutrition.     Nutrition Prescription Ordered    Current Diet Order: NPO     Nutrition Risk Screen     Nutrition Risk Screen: no indicators present    Nutrition/Diet History    Typical Food/Fluid Intake: Pt reports good PO intake pta. Currently NPO awaiting SLP eval.   Factors Affecting Nutritional Intake: NPO    Labs/Tests/Procedures/Meds    Pertinent Labs Reviewed: reviewed  Pertinent Labs Comments: Ca 8.6, Alb 3.3  Pertinent Medications Reviewed: reviewed    Physical Findings    Overall Physical Appearance: overweight  Oral/Mouth Cavity: WDL  Skin: intact    Anthropometrics    Height (inches): 66.5 in  Weight Method: Bed Scale  Weight (kg): 81.3 kg  Ideal Body Weight (IBW), Female: 132.5 lb  % Ideal Body Weight, Female (lb): 135.28   BMI (kg/m2): 28.5  BMI Grade: 25 - 29.9 - overweight    Estimated/Assessed Needs    Weight Used For Calorie Calculations: 81.3 kg (179 lb 3.7 oz)   Height (cm): 168.9 cm  Energy Need Method: [place]-St Jeor (1.3 PAL: 1770kcal)  RMR ([place]-St. Jeor Equation): 1362.41  Weight Used For Protein Calculations: 81.3 kg (179 lb 3.7 oz)  Protein Requirements: 81-97g (1-1.2g/kg)  Fluid Need Method: RDA Method (or per MD)    Monitor and Evaluation    Food and Nutrient Intake: energy intake  Food and Nutrient Adminstration: diet order  Anthropometric Measurements: weight, weight change  Biochemical Data, Medical Tests  and Procedures: other (specify) (All labs)  Nutrition-Focused Physical Findings: overall appearance    Nutrition Risk    Level of Risk: high    Nutrition Follow-Up    RD Follow-up?: Yes    Assessment and Plan    Inadequate energy intake r/t decreased ability to consume adequate energy AEB no meal intake yet - new.

## 2017-03-13 DIAGNOSIS — I61.1 NONTRAUMATIC CORTICAL HEMORRHAGE OF LEFT CEREBRAL HEMISPHERE: Primary | ICD-10-CM

## 2017-03-13 LAB
ALBUMIN SERPL BCP-MCNC: 2.4 G/DL
ALBUMIN SERPL BCP-MCNC: 3 G/DL
ALP SERPL-CCNC: 54 U/L
ALP SERPL-CCNC: 67 U/L
ALT SERPL W/O P-5'-P-CCNC: 12 U/L
ALT SERPL W/O P-5'-P-CCNC: 15 U/L
ANION GAP SERPL CALC-SCNC: 6 MMOL/L
ANION GAP SERPL CALC-SCNC: 8 MMOL/L
AST SERPL-CCNC: 16 U/L
AST SERPL-CCNC: 20 U/L
BACTERIA #/AREA URNS AUTO: NORMAL /HPF
BASOPHILS # BLD AUTO: 0 K/UL
BASOPHILS NFR BLD: 0 %
BILIRUB SERPL-MCNC: 1 MG/DL
BILIRUB SERPL-MCNC: 1.5 MG/DL
BILIRUB UR QL STRIP: NEGATIVE
BUN SERPL-MCNC: 8 MG/DL
BUN SERPL-MCNC: 9 MG/DL
CALCIUM SERPL-MCNC: 6.4 MG/DL
CALCIUM SERPL-MCNC: 8.4 MG/DL
CHLORIDE SERPL-SCNC: 106 MMOL/L
CHLORIDE SERPL-SCNC: 115 MMOL/L
CLARITY UR REFRACT.AUTO: CLEAR
CO2 SERPL-SCNC: 16 MMOL/L
CO2 SERPL-SCNC: 19 MMOL/L
COLOR UR AUTO: ABNORMAL
CREAT SERPL-MCNC: 0.5 MG/DL
CREAT SERPL-MCNC: 0.6 MG/DL
DIASTOLIC DYSFUNCTION: YES
DIFFERENTIAL METHOD: ABNORMAL
EOSINOPHIL # BLD AUTO: 0.2 K/UL
EOSINOPHIL NFR BLD: 3.4 %
ERYTHROCYTE [DISTWIDTH] IN BLOOD BY AUTOMATED COUNT: 13.5 %
EST. GFR  (AFRICAN AMERICAN): >60 ML/MIN/1.73 M^2
EST. GFR  (AFRICAN AMERICAN): >60 ML/MIN/1.73 M^2
EST. GFR  (NON AFRICAN AMERICAN): >60 ML/MIN/1.73 M^2
EST. GFR  (NON AFRICAN AMERICAN): >60 ML/MIN/1.73 M^2
ESTIMATED PA SYSTOLIC PRESSURE: 28
GLUCOSE SERPL-MCNC: 103 MG/DL
GLUCOSE SERPL-MCNC: 117 MG/DL
GLUCOSE UR QL STRIP: NEGATIVE
HCT VFR BLD AUTO: 33.6 %
HGB BLD-MCNC: 11.6 G/DL
HGB UR QL STRIP: ABNORMAL
KETONES UR QL STRIP: NEGATIVE
LEUKOCYTE ESTERASE UR QL STRIP: ABNORMAL
LYMPHOCYTES # BLD AUTO: 1.9 K/UL
LYMPHOCYTES NFR BLD: 27.7 %
MAGNESIUM SERPL-MCNC: 1.2 MG/DL
MAGNESIUM SERPL-MCNC: 1.9 MG/DL
MCH RBC QN AUTO: 30.4 PG
MCHC RBC AUTO-ENTMCNC: 34.5 %
MCV RBC AUTO: 88 FL
MICROSCOPIC COMMENT: NORMAL
MONOCYTES # BLD AUTO: 0.6 K/UL
MONOCYTES NFR BLD: 9.2 %
NEUTROPHILS # BLD AUTO: 4.2 K/UL
NEUTROPHILS NFR BLD: 59.4 %
NITRITE UR QL STRIP: NEGATIVE
OSMOLALITY SERPL: 281 MOSM/KG
OSMOLALITY UR: 299 MOSM/KG
PH UR STRIP: 7 [PH] (ref 5–8)
PHOSPHATE SERPL-MCNC: 2.1 MG/DL
PHOSPHATE SERPL-MCNC: 2.6 MG/DL
PHOSPHATE SERPL-MCNC: 3.3 MG/DL
PLATELET # BLD AUTO: 136 K/UL
PMV BLD AUTO: 10.1 FL
POCT GLUCOSE: 108 MG/DL (ref 70–110)
POCT GLUCOSE: 115 MG/DL (ref 70–110)
POCT GLUCOSE: 130 MG/DL (ref 70–110)
POCT GLUCOSE: 92 MG/DL (ref 70–110)
POTASSIUM SERPL-SCNC: 2.8 MMOL/L
POTASSIUM SERPL-SCNC: 3.8 MMOL/L
POTASSIUM SERPL-SCNC: 4.7 MMOL/L
PROT SERPL-MCNC: 4.4 G/DL
PROT SERPL-MCNC: 5.5 G/DL
PROT UR QL STRIP: NEGATIVE
RBC # BLD AUTO: 3.82 M/UL
RBC #/AREA URNS AUTO: 2 /HPF (ref 0–4)
RETIRED EF AND QEF - SEE NOTES: 70 (ref 55–65)
SODIUM SERPL-SCNC: 133 MMOL/L
SODIUM SERPL-SCNC: 134 MMOL/L
SODIUM SERPL-SCNC: 136 MMOL/L
SODIUM SERPL-SCNC: 137 MMOL/L
SODIUM SERPL-SCNC: 138 MMOL/L
SODIUM SERPL-SCNC: 141 MMOL/L
SP GR UR STRIP: 1 (ref 1–1.03)
SQUAMOUS #/AREA URNS AUTO: 1 /HPF
TRICUSPID VALVE REGURGITATION: ABNORMAL
URN SPEC COLLECT METH UR: ABNORMAL
UROBILINOGEN UR STRIP-ACNC: NEGATIVE EU/DL
WBC # BLD AUTO: 6.98 K/UL
WBC #/AREA URNS AUTO: 1 /HPF (ref 0–5)

## 2017-03-13 PROCEDURE — 84100 ASSAY OF PHOSPHORUS: CPT | Mod: 91

## 2017-03-13 PROCEDURE — 80053 COMPREHEN METABOLIC PANEL: CPT | Mod: 91

## 2017-03-13 PROCEDURE — 99233 SBSQ HOSP IP/OBS HIGH 50: CPT | Mod: GC,,, | Performed by: PSYCHIATRY & NEUROLOGY

## 2017-03-13 PROCEDURE — 84100 ASSAY OF PHOSPHORUS: CPT

## 2017-03-13 PROCEDURE — 83735 ASSAY OF MAGNESIUM: CPT | Mod: 91

## 2017-03-13 PROCEDURE — 84132 ASSAY OF SERUM POTASSIUM: CPT

## 2017-03-13 PROCEDURE — 80053 COMPREHEN METABOLIC PANEL: CPT

## 2017-03-13 PROCEDURE — 97110 THERAPEUTIC EXERCISES: CPT

## 2017-03-13 PROCEDURE — 85025 COMPLETE CBC W/AUTO DIFF WBC: CPT

## 2017-03-13 PROCEDURE — 97535 SELF CARE MNGMENT TRAINING: CPT

## 2017-03-13 PROCEDURE — 63600175 PHARM REV CODE 636 W HCPCS: Performed by: PHYSICIAN ASSISTANT

## 2017-03-13 PROCEDURE — 25000003 PHARM REV CODE 250: Performed by: PSYCHIATRY & NEUROLOGY

## 2017-03-13 PROCEDURE — 25000003 PHARM REV CODE 250: Performed by: PHYSICIAN ASSISTANT

## 2017-03-13 PROCEDURE — 81001 URINALYSIS AUTO W/SCOPE: CPT

## 2017-03-13 PROCEDURE — 84295 ASSAY OF SERUM SODIUM: CPT | Mod: 91

## 2017-03-13 PROCEDURE — 92523 SPEECH SOUND LANG COMPREHEN: CPT

## 2017-03-13 PROCEDURE — 97112 NEUROMUSCULAR REEDUCATION: CPT

## 2017-03-13 PROCEDURE — 92526 ORAL FUNCTION THERAPY: CPT

## 2017-03-13 PROCEDURE — 95819 EEG AWAKE AND ASLEEP: CPT | Mod: 26,,, | Performed by: PSYCHIATRY & NEUROLOGY

## 2017-03-13 PROCEDURE — 99233 SBSQ HOSP IP/OBS HIGH 50: CPT | Mod: ,,, | Performed by: PHYSICIAN ASSISTANT

## 2017-03-13 PROCEDURE — 84295 ASSAY OF SERUM SODIUM: CPT

## 2017-03-13 PROCEDURE — 83735 ASSAY OF MAGNESIUM: CPT

## 2017-03-13 PROCEDURE — 20000000 HC ICU ROOM

## 2017-03-13 PROCEDURE — 83935 ASSAY OF URINE OSMOLALITY: CPT

## 2017-03-13 PROCEDURE — 97530 THERAPEUTIC ACTIVITIES: CPT

## 2017-03-13 PROCEDURE — 83930 ASSAY OF BLOOD OSMOLALITY: CPT

## 2017-03-13 PROCEDURE — 95816 EEG AWAKE AND DROWSY: CPT

## 2017-03-13 RX ORDER — METOPROLOL TARTRATE 25 MG/1
12.5 TABLET ORAL 3 TIMES DAILY
Status: DISCONTINUED | OUTPATIENT
Start: 2017-03-13 | End: 2017-03-14

## 2017-03-13 RX ORDER — HEPARIN SODIUM 5000 [USP'U]/ML
5000 INJECTION, SOLUTION INTRAVENOUS; SUBCUTANEOUS EVERY 8 HOURS
Status: DISCONTINUED | OUTPATIENT
Start: 2017-03-13 | End: 2017-03-16 | Stop reason: HOSPADM

## 2017-03-13 RX ORDER — HYDRALAZINE HYDROCHLORIDE 20 MG/ML
10 INJECTION INTRAMUSCULAR; INTRAVENOUS EVERY 4 HOURS PRN
Status: DISCONTINUED | OUTPATIENT
Start: 2017-03-13 | End: 2017-03-14

## 2017-03-13 RX ORDER — LISINOPRIL 10 MG/1
10 TABLET ORAL ONCE
Status: COMPLETED | OUTPATIENT
Start: 2017-03-13 | End: 2017-03-13

## 2017-03-13 RX ORDER — LISINOPRIL 20 MG/1
20 TABLET ORAL DAILY
Status: DISCONTINUED | OUTPATIENT
Start: 2017-03-14 | End: 2017-03-16 | Stop reason: HOSPADM

## 2017-03-13 RX ADMIN — LISINOPRIL 10 MG: 10 TABLET ORAL at 09:03

## 2017-03-13 RX ADMIN — Medication 3 ML: at 09:03

## 2017-03-13 RX ADMIN — LISINOPRIL 10 MG: 10 TABLET ORAL at 01:03

## 2017-03-13 RX ADMIN — LABETALOL HYDROCHLORIDE 10 MG: 5 INJECTION, SOLUTION INTRAVENOUS at 11:03

## 2017-03-13 RX ADMIN — Medication 12.5 MG: at 01:03

## 2017-03-13 RX ADMIN — HEPARIN SODIUM 5000 UNITS: 5000 INJECTION, SOLUTION INTRAVENOUS; SUBCUTANEOUS at 01:03

## 2017-03-13 RX ADMIN — Medication 3 ML: at 06:03

## 2017-03-13 RX ADMIN — Medication 12.5 MG: at 09:03

## 2017-03-13 RX ADMIN — ATORVASTATIN CALCIUM 40 MG: 20 TABLET, FILM COATED ORAL at 09:03

## 2017-03-13 RX ADMIN — HEPARIN SODIUM 5000 UNITS: 5000 INJECTION, SOLUTION INTRAVENOUS; SUBCUTANEOUS at 09:03

## 2017-03-13 RX ADMIN — LABETALOL HYDROCHLORIDE 10 MG: 5 INJECTION, SOLUTION INTRAVENOUS at 08:03

## 2017-03-13 RX ADMIN — HYDRALAZINE HYDROCHLORIDE 10 MG: 20 INJECTION INTRAMUSCULAR; INTRAVENOUS at 03:03

## 2017-03-13 RX ADMIN — SODIUM PHOSPHATE, MONOBASIC, MONOHYDRATE 15 MMOL: 276; 142 INJECTION, SOLUTION INTRAVENOUS at 09:03

## 2017-03-13 RX ADMIN — POTASSIUM CHLORIDE 40 MEQ: 20 SOLUTION ORAL at 09:03

## 2017-03-13 RX ADMIN — HYDRALAZINE HYDROCHLORIDE 10 MG: 20 INJECTION INTRAMUSCULAR; INTRAVENOUS at 01:03

## 2017-03-13 NOTE — PT/OT/SLP PROGRESS
"Occupational Therapy  Treatment    Kaylin Neff   MRN: [id number]   Admitting Diagnosis: Left-sided nontraumatic intracerebral hemorrhage    OT Date of Treatment: 17   OT Start Time: 1608  OT Stop Time: 1637  OT Total Time (min): 29 min    Billable Minutes:  Self Care/Home Management 10, Therapeutic Exercise 10 and Cognitive Retraining 9   2 billable units this visit    General Precautions: Standard, fall  Orthopedic Precautions: N/A  Braces: N/A    Do you have any cultural, spiritual, Holiness conflicts, given your current situation?: None    Subjective:  "I guess I am just so tired."  Communicated with RN prior to session.  Pt agreeable to therapy    Pain Ratin/10     Pain Rating Post-Intervention: 0/10    Objective:  Patient found with: telemetry, peripheral IV, blood pressure cuff, arterial line     Functional Mobility:  Pt already up in the chair upon arrival. Declined desire to return to bed after therapy.     Activities of Daily Living:  Provided education on and demonstrated rebeca dressing techniques for UB and LB dressing.  Pt practiced techniques seated in chair.   LE Dressing Level of Assistance: Stand by assistance (for cues to use rebeca dressing techniques)     Therapeutic Activities and Exercises:  B UE AAROM shoulder flex/ext, shoulder IR/ER,bicep flex/ext, wrist flex/ext, IP flex ext 1x10 reps  Pt able to count reps.     Cognitive exercises:  Eduaction on cognitive tasks pt and her  can do together. Practiced immediate recall of 3 words as well as delayed recall (5 min).  Pt has good ability to perform immediate recall for 3 words.  After 5 minutes she was able to recall 1/3 words.  When given the category of the word pt not able to recall 1/2 of the words (i.e. Category flower; word was tulip) When given 3 choices she was able to select the correct one.  Further assessment of sequencing for functional task of cooking.   facilitating pt well with proper cues and waiting time " for pt to process. Pt described how to make spaghetti. She demonstrates delayed processing but with cues and additional time typically is able to recall.     AM-PAC 6 CLICK ADL   How much help from another person does this patient currently need?   1 = Unable, Total/Dependent Assistance  2 = A lot, Maximum/Moderate Assistance  3 = A little, Minimum/Contact Guard/Supervision  4 = None, Modified [place]/Independent    Putting on and taking off regular lower body clothing? : 2  Bathing (including washing, rinsing, drying)?: 2  Toileting, which includes using toilet, bedpan, or urinal? : 2  Putting on and taking off regular upper body clothing?: 2  Eating meals?: 2     AM-PAC Raw Score CMS G-Code Modifier Level of Impairment Assistance   6 % Total / Unable   7 - 9 CM 80 - 100% Maximal Assist   10 - 14 CL 60 - 80% Moderate Assist   15 - 19 CK 40 - 60% Moderate Assist   20 - 22 CJ 20 - 40% Minimal Assist   23 CI 1-20% SBA / CGA   24 CH 0% Independent/ Mod I     Patient left up in chair with call button in reach    ASSESSMENT:  Kaylin Neff is a 71 y.o. female with a medical diagnosis of Left-sided nontraumatic intracerebral hemorrhage and presents with decline in ADLs and functional mobility.   Pt would benefit from skilled OT services in order to maximize independence with ADLs and facilitate safe discharge.    Rehab identified problem list/impairments: Rehab identified problem list/impairments: weakness, impaired endurance, impaired sensation, impaired self care skills, impaired functional mobilty, gait instability, impaired balance, decreased coordination, visual deficits, impaired cognition, decreased safety awareness    Rehab potential is good.    Activity tolerance: Good    Discharge recommendations: Discharge Facility/Level Of Care Needs: rehabilitation facility     Barriers to discharge: Barriers to Discharge: Inaccessible home environment, Decreased caregiver support    Equipment recommendations:  none     GOALS:   Occupational Therapy Goals        Problem: Occupational Therapy Goal    Goal Priority Disciplines Outcome Interventions   Occupational Therapy Goal     OT, PT/OT Ongoing (interventions implemented as appropriate)    Description:  Goals to be met by: 3/22/2017    Patient will increase functional independence with ADLs by performing:    Feeding with Set-up Assistance.  UE Dressing with Stand-by Assistance.  LE Dressing with moderate assistance.  Grooming while seated with Minimal Assistance.  Toileting from bedside commode with Minimal Assistance for hygiene and clothing management.   Bathing from  edge of bed with Moderate Assistance.  Sitting at edge of bed x20 minutes with Stand-by Assistance.  Supine to sit with contact guard Assistance.  Stand pivot transfers with Contact Guard Assistance.  Toilet transfer to bedside commode with Minimal Assistance.                Plan:  Patient to be seen 6 x/week to address the above listed problems via self-care/home management, therapeutic activities, therapeutic exercises, neuromuscular re-education  Plan of Care reviewed with: patient, spouse         BESSIE Reardon  03/13/2017

## 2017-03-13 NOTE — PLAN OF CARE
"Problem: Patient Care Overview  Goal: Plan of Care Review  Outcome: Ongoing (interventions implemented as appropriate)  POC reviewed with pt and family at 1400. Pt verbalized understanding. Family at bedside today. Questions and concerns addressed. No acute events today. Pt progressing toward goals. Will continue to monitor. See flowsheets for full assessment and VS info.          Goal: Individualization & Mutuality  Outcome: Ongoing (interventions implemented as appropriate)  Past Medical History:   Diagnosis Date    Hypertension      Smokes 1 to 10 cigarettes per day 03/11/2017     History reviewed. No pertinent surgical history.     BP (!) 130/58  Pulse 61  Temp 97.6 °F (36.4 °C)  Resp 17  Ht 5' 6.5" (1.689 m)  Wt 80.6 kg (177 lb 11.1 oz)  SpO2 98%  Breastfeeding? No  BMI 28.25 kg/m2     Patient likes for  to help with care.      "

## 2017-03-13 NOTE — PT/OT/SLP PROGRESS
"Physical Therapy  Treatment    Kaylin Neff   MRN: [id number]   Admitting Diagnosis: Left-sided nontraumatic intracerebral hemorrhage    PT Received On: 17  PT Start Time: 1052     PT Stop Time: 1118    PT Total Time (min): 26 min       Billable Minutes:  Therapeutic Activity 10 and Neuromuscular Re-education 15    Treatment Type: Treatment  PT/PTA: PT           General Precautions: Standard, fall    Do you have any cultural, spiritual, Jehovah's witness conflicts, given your current situation?: None noted    Subjective:  Communicated with RN (Olga) prior to session.    "I usually wear my hair clipped up so it doesn't tangle. This is all so strange because I am used to using my R hand." pt states    Pain Ratin/10  Pain Rating Post-Intervention: 0/10    Objective:   Patient found with: telemetry, pulse ox (continuous), peripheral IV, blood pressure cuff    Functional Mobility:  Bed Mobility:   Rolling/Turning to Left: Minimum assistance  Scooting/Bridging: Minimum Assistance  Supine to Sit: Minimum Assistance  Sit to Supine:  (pt remained UIC)    Transfers:  Sit <> Stand Assistance: Contact Guard Assistance (x5 trials total from EOB, from bedside commode)  Sit <> Stand Assistive Device: No Assistive Device  Bed <> Chair Technique: Stand Pivot  Bed <> Chair Assistance: Minimum Assistance (x3 trials; bed<>commode; commode<>bed; bed<>WC)  Bed <> Chair Assistive Device: No Assistive Device    Gait:   Gait Distance: x10 steps total during transfers with appropriate sequencing and WS; pt demo poor termination during attempts to sit and req increased cueing for termination of side stepping.  Assistance 1: Minimum assistance  Gait Assistive Device: No device  Gait Pattern: 2-point gait  Gait Deviation(s): decreased dileep, decreased weight-shifting ability    Balance:   Static Sit: FAIR+: Able to take MINIMAL challenges from all directions  Dynamic Sit: FAIR+: Maintains balance through MINIMAL excursions of active " trunk motion  Static Stand: POOR+: Needs MINIMAL assist to maintain  Dynamic stand: FAIR: Needs CONTACT GUARD during gait     Pt instructed on sit to stand task practice x5 trials throughout session with improved sequencing and postural awareness; pt demo increased R UE extension/inattention with increased effort during functional mobility and req min A for maintaining awareness to R side.    Pt demo difficulty with termination of movement; often attempting to sit to the R with poor ability to determine midline. Pt maintained standing balance x12 minutes total this date with CGA/Min A to R UE. Noted poor sequencing of B LE for turing during transfers; education provided on best techniques to enhance postural awareness.    Therapeutic Activities and Exercises:  PT arrived to pt's room to find pt resting quietly; spouse at bedside. RN stating need to tx to CT. Pt requesting use of commode. PT assisted pt to commode; pt req close SBA/min A for self care this date. Pt req mod A for donning of gown as robe. Pt assisted to W/C for transport to CT; Resident entered room to interview pt. Questions/concerns addressed within PT scope of practice.  PT reviewed mobility needs with RN to ensure safety during transfers.    AM-PAC 6 CLICK MOBILITY  How much help from another person does this patient currently need?   1 = Unable, Total/Dependent Assistance  2 = A lot, Maximum/Moderate Assistance  3 = A little, Minimum/Contact Guard/Supervision  4 = None, Modified [place]/Independent    Turning over in bed (including adjusting bedclothes, sheets and blankets)?: 3  Sitting down on and standing up from a chair with arms (e.g., wheelchair, bedside commode, etc.): 3  Moving from lying on back to sitting on the side of the bed?: 3  Moving to and from a bed to a chair (including a wheelchair)?: 3  Need to walk in hospital room?: 3  Climbing 3-5 steps with a railing?: 2  Total Score: 17    AM-PAC Raw Score CMS G-Code Modifier Level  of Impairment Assistance   6 % Total / Unable   7 - 9 CM 80 - 100% Maximal Assist   10 - 14 CL 60 - 80% Moderate Assist   15 - 19 CK 40 - 60% Moderate Assist   20 - 22 CJ 20 - 40% Minimal Assist   23 CI 1-20% SBA / CGA   24 CH 0% Independent/ Mod I     Patient left up in W/C with all lines intact, call button in reach, RN notified and RN and Resident present.    Assessment:  Kaylin Neff is a 71 y.o. female with a medical diagnosis of Left-sided nontraumatic intracerebral hemorrhage and presents with improved functional mobility and activity tolerance this session. Pt continues to req CGA/Min A for mobility 2/2 R hemiparesis and inattention. Pt with improved standing balance tolerance and communication this date. Pt remains highly motivated to improve current condition and participate in therapy services; pt has excellent support of spouse. Pt's role as (I) caregiver of self and role as spouse has been affected. Pt is not at her baseline and will benefit from Rehab prior to D/C to return to (I) PLOF. Patient will benefit from continued PT services to address:    Rehab identified problem list/impairments: Rehab identified problem list/impairments: weakness, impaired endurance, impaired sensation, impaired self care skills, impaired functional mobilty, gait instability, impaired balance, visual deficits, decreased coordination, decreased upper extremity function, decreased lower extremity function, impaired cognition, decreased safety awareness, abnormal tone, impaired coordination    Rehab potential is good.    Activity tolerance: Good    Discharge recommendations: Discharge Facility/Level Of Care Needs: rehabilitation facility     Barriers to discharge: Barriers to Discharge: Inaccessible home environment, Decreased caregiver support    Equipment recommendations: Equipment Needed After Discharge:  (TBD next level of care)     GOALS:   Physical Therapy Goals        Problem: Physical Therapy Goal    Goal  Priority Disciplines Outcome Goal Variances Interventions   Physical Therapy Goal     PT/OT, PT Ongoing (interventions implemented as appropriate)     Description:  Goals to be met by: 3/20/2017     Patient will increase functional independence with mobility by performin. Supine to sit with Stand-by Assistance  2. Sit to supine with Stand-by Assistance  3. Sit to stand transfer with Stand-by Assistance  4. Bed to chair transfer with Contact Guard Assistance using AD or No AD  5. Gait x50 feet with Stand-by Assistance using AD or No AD  6. Sitting at edge of bed x10 minutes with Supervision  7. Stand for x10 minutes with Stand-by Assistance using Ad or NO AD  8. Lower extremity exercise program x15 reps per handout, with supervision              PLAN:    Patient to be seen 6 x/week  to address the above listed problems via gait training, therapeutic activities, therapeutic exercises, neuromuscular re-education  Plan of Care expires: 17  Plan of Care reviewed with: patient, spouse     Rocio Espinosa, PT, DPT  [phone]  3/13/2017

## 2017-03-13 NOTE — PROGRESS NOTES
Neuro Critical Care  Progress Note    Chief Complaint: ICH    Admit Date: 3/11/2017   Length of Stay:  LOS: 2 days     Interval      No acute events. Sodium corrected . CT head and neuro exam stable. Will likely step down to stroke service.     Review of Systems:     Constitutional: no fever or chills  Eyes: no visual changes  ENT: no nasal congestion or sore throat  Respiratory: no cough or shortness of breath  Cardiovascular: no chest pain or palpitations  Gastrointestinal: no nausea or vomiting, no abdominal pain or change in bowel habits  Genitourinary: no hematuria or dysuria  Musculoskeletal: no arthralgias or myalgias  Neurological: no seizures or tremors  Endocrine: no heat or cold intolerance    Past Medical & Surgical History:     Past Medical History:   Diagnosis Date    Hypertension     Smokes 1 to 10 cigarettes per day 03/11/2017                                                                            History reviewed. No pertinent surgical history.     Home Medications:     Prior to Admission medications    Not on File       Allergies:     Review of patient's allergies indicates:  No Known Allergies     Social & Family History:     Social History     Social History    Marital status:      Spouse name: N/A    Number of children: N/A    Years of education: N/A     Social History Main Topics    Smoking status: Current Some Day Smoker     Years: 55.00     Types: Cigarettes    Smokeless tobacco: None    Alcohol use None    Drug use: None    Sexual activity: Not Asked     Other Topics Concern    None     Social History Narrative                                                                          History reviewed. No pertinent family history.     Objective:     Vital Signs (Most Recent):  Temp: 97.8 °F (36.6 °C) (03/13/17 0701)  Pulse: 80 (03/13/17 1100)  Resp: (!) 32 (03/13/17 1100)  BP: (!) 134/93 (03/13/17 0930)  SpO2: 95 % (03/13/17 1100) Vital Signs Range (Last 24H):  Temp:   [97.6 °F (36.4 °C)-98.2 °F (36.8 °C)]   Pulse:  [58-92]   Resp:  [14-49]   BP: (110-156)/(55-94)   SpO2:  [94 %-100 %]   Arterial Line BP: (108-161)/(45-67)      Body mass index is 28.25 kg/(m^2).     Hemodynamic Monitoring:         Physical Exam:     General: well developed, well nourished  Head: normocephalic, atraumatic  Eyes:  conjunctivae/corneas clear. PERRL.  Skin: Skin color, texture, turgor normal. No rashes or lesions  Neurologic: Mental status: Alert, oriented, thought content appropriate  Cranial nerves: II: visual field normal, II: pupils equal, round, reactive to light and accommodation, III,VII: ptosis none, III,IV,VI: extraocular muscles extra-ocular motions intact, V: facial light touch sensation normal bilaterally, VII: upper facial muscle function normal bilaterally, VII: lower facial muscle function reduced on the right, XII: tongue strength normal   Sensory: normal  Motor:3/5 proximally and distally on the RUE. 5/5 on the LUE, LLE. 4+/5 proximally on the RLE; 5/5 distally  Coordination: normal finger to nose on the left; limited by weakness on the right    Laboratory:  CBC:     Recent Labs  Lab 03/13/17  0334   WBC 6.98   RBC 3.82*   HGB 11.6*   HCT 33.6*   *   MCV 88   MCH 30.4   MCHC 34.5     BMP:     Recent Labs  Lab 03/13/17  0437   *   *   K 3.8      CO2 19*   BUN 9   CREATININE 0.6   CALCIUM 8.4*   MG 1.9     CMP:     Recent Labs  Lab 03/13/17  0437   *   CALCIUM 8.4*   ALBUMIN 3.0*   PROT 5.5*   *   K 3.8   CO2 19*      BUN 9   CREATININE 0.6   ALKPHOS 67   ALT 15   AST 20   BILITOT 1.5*     ABGs: No results for input(s): PH, PCO2, PO2, HCO3, POCSATURATED, BE in the last 168 hours.        ASSESSMENT/PLAN:   Hospital Problems:  Active Hospital Problems    Diagnosis  POA    *Left-sided nontraumatic intracerebral hemorrhage [I61.9]  Yes    Hyperlipidemia [E78.5]  Unknown    Vasogenic cerebral edema [G93.6]  Yes    Cigarette smoker [F17.210]   Yes    Hemiparesis, right [G81.91]  Yes    Essential hypertension [I10]  Yes      Resolved Hospital Problems    Diagnosis Date Resolved POA   No resolved problems to display.       Primary Diagnosis:  Left-sided nontraumatic intracerebral hemorrhage    Plan:  Neuro:   Non-traumatic left parietal ICH  - no hydrocephalus or midline shift. +mass effect  - History on reported untreated HTN, possible hypertensive bleed but she will need outpatient MRI once resolution of blood for work up of underlying lesion   - NSGY following  - , starting atorvastatin    Pulmonary:   CXR (3/11): no evidence of cardiopulmonary process  On RA, no active issues      Cardiac:   ECHO with diastolic dysfunction   SBP goal <140- started lisinopril, and metoprolol--  off cardene   Hyperlipidemia, started lipitor 40    Renal:   I & O (Last 24H):    Intake/Output Summary (Last 24 hours) at 03/13/17 1207  Last data filed at 03/13/17 0913   Gross per 24 hour   Intake          1840.58 ml   Output              400 ml   Net          1440.58 ml       BUN/Cr 18/0.8    Infectious Disease:   No leukocytosis, afebrile    Hematology/Oncology:  H/H stable  Platelets: [id number]  PT/INR: 13.2/1.0     Endocrine:   A1c, tsh wnl  ,   Educate patient on diet and compliance with statin     Fluids/Electrolytes/Nutrition/GI:   IVF: Stopped, patient eating and drinking     Hyponatremia   --SIADH, fluids discontinued with subsequent normalization of sodiums  --recommend continued follow up       Prophylaxis:  SQH started,SCD    Lines:  Peripheral IV           Maria Antonia Kuhn PA-C  Neurocritical Care  [facility]  Pager/Spectralink #[phone]

## 2017-03-13 NOTE — PLAN OF CARE
Problem: Patient Care Overview  Goal: Plan of Care Review  Outcome: Ongoing (interventions implemented as appropriate)  POC reviewed with pt and spouse at 0500. Pt verbalized understanding. No acute events overnight. Pt progressing toward goals. Will continue to monitor. See flowsheets for full assessment and VS info

## 2017-03-13 NOTE — CONSULTS
PM&R consult received.  Reviewed patient history and current admission.      Evaluation completed.  Please see PM&R student note on 3/14/17 for full consult.     Patient with good goals for rehab; however, patient and her  prefer [facility], which is closer to home.  Will sign off.  Please call with questions/concerns or re-consult if situation changes.    CODIE Cardona, FNP-C  Physical Medicine & Rehabilitation   03/13/2017  Spectralink: [phone]

## 2017-03-13 NOTE — PROGRESS NOTES
Progress Note  Neurosurgery    Admit Date: 3/11/2017  Post-operative Day:    Hospital Day: 3    SUBJECTIVE:     Kaylin Neff is a 71 y.o. female with left parietal ICH.  ICH of 0.    NAEON    Follow-up For:  * No surgery found *    NAEON  Scheduled Meds:   atorvastatin  40 mg Oral Daily    lisinopril  10 mg Oral Daily    sodium chloride 0.9%  3 mL Intravenous Q8H     Continuous Infusions:   sodium chloride 0.9% 100 mL/hr at 03/13/17 0701     PRN Meds:labetalol, magnesium sulfate IVPB, magnesium sulfate IVPB, ondansetron, potassium chloride 10%, potassium chloride 10%, potassium chloride 10%, sodium phosphate IVPB, sodium phosphate IVPB, sodium phosphate IVPB    Review of patient's allergies indicates:  No Known Allergies    OBJECTIVE:     Vital Signs (Most Recent)  Temp: 97.8 °F (36.6 °C) (03/13/17 0701)  Pulse: 72 (03/13/17 0701)  Resp: (!) 25 (03/13/17 0701)  BP: 124/60 (03/13/17 0100)  SpO2: 97 % (03/13/17 0701)    Vital Signs Range (Last 24H):  Temp:  [97.6 °F (36.4 °C)-98.2 °F (36.8 °C)]   Pulse:  [58-92]   Resp:  [14-49]   BP: (110-156)/(55-94)   SpO2:  [94 %-100 %]   Arterial Line BP: (108-161)/(43-67)     I & O (Last 24H):    Intake/Output Summary (Last 24 hours) at 03/13/17 0855  Last data filed at 03/13/17 0701   Gross per 24 hour   Intake          2027.46 ml   Output              400 ml   Net          1627.46 ml     Physical Exam:  AAOX3, mild word finding difficulty  PERRL, EOMI, face symm, tongue midline  3/5 in RUE, 4/5 in RLE, 5/5 on left  SILT  Reflexes symm  +drift on right, visual fields grossly intact    Lines/Drains:       Peripheral IV - Single Lumen 03/11/17 1046 Right Antecubital (Active)   Site Assessment No redness;No swelling;Intact;Clean;Dry 3/12/2017  3:00 AM   Line Status Infusing 3/12/2017  3:00 AM   Dressing Status Clean;Dry;Intact 3/12/2017  3:00 AM   Dressing Intervention Dressing reinforced 3/12/2017  3:00 AM   Dressing Change Due 03/15/17 3/12/2017  3:00 AM   Site Change Due  03/15/17 3/11/2017  7:05 PM   Reason Not Rotated Not due 3/12/2017  3:00 AM   Number of days:0            Peripheral IV - Single Lumen 03/11/17 1114 Left Antecubital (Active)   Site Assessment Clean;Dry;Intact;No redness;No swelling 3/12/2017  3:00 AM   Line Status Saline locked 3/12/2017  3:00 AM   Dressing Status Clean;Dry;Intact 3/12/2017  3:00 AM   Dressing Intervention Dressing reinforced 3/12/2017  3:00 AM   Dressing Change Due 03/15/17 3/12/2017  3:00 AM   Site Change Due 03/15/17 3/11/2017  7:05 PM   Reason Not Rotated Not due 3/12/2017  3:00 AM   Number of days:0            Arterial Line 03/11/17 2040 Right Radial (Active)   Site Assessment Clean;Dry;Intact;No redness;No swelling 3/12/2017  3:00 AM   Line Status Pulsatile blood flow 3/12/2017  3:00 AM   Art Line Waveform Appropriate;Square wave test performed 3/12/2017  3:00 AM   Arterial Line Interventions Zeroed and calibrated;Leveled;Connections checked and tightened;Flushed per protocol 3/12/2017  3:00 AM   Color/Movement/Sensation Capillary refill less than 3 sec 3/12/2017  3:00 AM   Dressing Type Transparent 3/12/2017  3:00 AM   Dressing Status Biopatch in place;Clean;Dry;Intact 3/12/2017  3:00 AM   Dressing Intervention New dressing 3/12/2017  3:00 AM   Dressing Change Due 03/18/17 3/12/2017  3:00 AM   Number of days:0       Wound/Incision:  na    Laboratory:  CBC:     Recent Labs  Lab 03/13/17  0334   WBC 6.98   RBC 3.82*   HGB 11.6*   HCT 33.6*   *   MCV 88   MCH 30.4   MCHC 34.5     CMP:     Recent Labs  Lab 03/13/17  0437   *   CALCIUM 8.4*   ALBUMIN 3.0*   PROT 5.5*   *   K 3.8   CO2 19*      BUN 9   CREATININE 0.6   ALKPHOS 67   ALT 15   AST 20   BILITOT 1.5*     Coagulation:     Recent Labs  Lab 03/11/17  1625   INR 1.0   APTT 23.1     Cardiac markers:     Recent Labs  Lab 03/11/17  1047   TROPONINI <0.012       Recent Labs  Lab 03/13/17  0709   COLORU Straw   SPECGRAV 1.005   PHUR 7.0   PROTEINUA Negative   BACTERIA  Rare   NITRITE Negative   LEUKOCYTESUR Trace*   UROBILINOGEN Negative         Diagnostic Results:  MRI/MRA w/o contrast reviewed.    ASSESSMENT/PLAN:     Assessment: 71 F with left parietal ICH.  ICH of 0.    -no neurosurgical intervention   -ordered repeat CTH to monitor stability of bleed  -MRI w/o contrast so cannot determine presence of underlying mass  -q 1 hr neuro checks  -SBP less than 150  -Goal eunatremia  -Maximal medical mgmt per NCC.  -pending stable repeat CTH; will sign off at this time and arrange follow up in clinic with MRI brain w/ w/o contrast; will contact pt with follow up appt

## 2017-03-13 NOTE — PLAN OF CARE
03/13/17 1306   Discharge Assessment   Assessment Type Discharge Planning Assessment   Confirmed/corrected address and phone number on facesheet? Yes   Assessment information obtained from? Patient   Expected Length of Stay (days) 3   Communicated expected length of stay with patient/caregiver yes   Prior to hospitilization cognitive status: Alert/Oriented   Prior to hospitalization functional status: Independent   Current cognitive status: Alert/Oriented   Current Functional Status: Needs Assistance   Arrived From [facility])   Lives With spouse   Able to Return to Prior Arrangements yes   Is patient able to care for self after discharge? Yes   How many people do you have in your home that can help with your care after discharge? 1   Who are your caregiver(s) and their phone number(s)? (patient spouse Kvng Neff [phone])   Patient's perception of discharge disposition home or selfcare   Readmission Within The Last 30 Days no previous admission in last 30 days   Patient currently being followed by outpatient case management? No   Patient currently receives home health services? No   Does the patient currently use HME? No   Patient currently receives private duty nursing? No   Patient currently receives any other outside agency services? No   Equipment Currently Used at Home none   Do you have any problems affording any of your prescribed medications? No   Is the patient taking medications as prescribed? yes   Do you have any financial concerns preventing you from receiving the healthcare you need? No   Does the patient have transportation to healthcare appointments? Yes   Transportation Available family or friend will provide   On Dialysis? No   Does the patient receive services at the Coumadin Clinic? No   Are there any open cases? No   Discharge Plan A Home;Home Health   Discharge Plan B Home;Home with family   Patient/Family In Agreement With Plan yes     This CM spoke  with patient at bedside,who states she does not have a pharmacy, if she needs medication she will get at that time.    Susan Rogers   [phone]

## 2017-03-13 NOTE — PHYSICIAN QUERY
PT Name: Kaylin Neff  MR #: [id number]     Physician Query Form - Documentation Clarification    Reviewer  [email or web address]    This form is a permanent document in the medical record.     Query Date: March 13, 2017  By submitting this query, we are merely seeking further clarification of documentation to reflect the severity of illness of your patient. Please utilize your independent clinical judgment when addressing the question(s) below.    (The Medical record reflects the following:)      Supporting Clinical Findings Location in Medical Record     Magnesium - 1.5-1.9    Magnesium Sulfate 2 GM IVPB        Labs 3/12-3/13    MAR 3/12                                                                            Doctor, Please specify diagnosis or diagnoses associated with above clinical findings.    Physician Use Only      [   X  ] Hypomagnesium   [     ] Other:   [     ] Unable to determine

## 2017-03-13 NOTE — PROCEDURES
ROUTINE ELECTROENCEPHALOGRAM REPORT      Kaylin Neff  [id number]  1945    DATE OF SERVICE: 3/13/17    REQUESTING PROVIDER: Maria Antonia Kuhn PA-C     REASON FOR CONSULT: 72yo F with L ICH    PRIOR EEG:  none    MEDICATIONS:   Current Facility-Administered Medications   Medication    atorvastatin tablet 40 mg    heparin (porcine) injection 5,000 Units    hydrALAZINE injection 10 mg    labetalol injection 10 mg    [START ON 3/14/2017] lisinopril tablet 20 mg    magnesium sulfate 2g in water 50mL IVPB (premix)    magnesium sulfate 2g in water 50mL IVPB (premix)    metoprolol tartrate (LOPRESSOR) split tablet 12.5 mg    ondansetron disintegrating tablet 8 mg    potassium chloride 10% solution 40 mEq    potassium chloride 10% solution 40 mEq    potassium chloride 10% solution 60 mEq    sodium chloride 0.9% flush 3 mL    sodium phosphate 15 mmol in dextrose 5 % 250 mL IVPB    sodium phosphate 20.01 mmol in dextrose 5 % 250 mL IVPB    sodium phosphate 30 mmol in dextrose 5 % 250 mL IVPB     METHODOLOGY   Electroencephalographic (EEG) recording is with electrodes placed according to the International 10-20 placement system.  Thirty two (32) channels of digital signal (sampling rate of 512/sec) including T1 and T2 was simultaneously recorded from the scalp and may include  EKG, EMG, and/or eye monitors.  Recording band pass was 0.1 to 512 hz.  Digital video recording of the patient is simultaneously recorded with the EEG.  The patient is instructed report clinical symptoms which may occur during the recording session.  EEG and video recording is stored and archived in digital format. Activation procedures which include photic stimulation, hyperventilation and instructing patients to perform simple task are done in selected patients.    The EEG is displayed on a monitor screen and can be reviewed using different montages.  Computer assisted analysis is employed to detect spike and electrographic seizure  activity.   The entire record is submitted for computer analysis.  The entire recording is visually reviewed and the times identified by computer analysis as being spikes or seizures are reviewed again.  Compresses spectral analysis (CSA) is also performed on the activity recorded from each individual channel.  This is displayed as a power display of frequencies from 0 to 30 Hz over time.   The CSA is reviewed looking for asymmetries in power between homologous areas of the scalp and then compared with the original EEG recording.     [employer] software was also utilized in the review of this study.  This software suite analyzes the EEG recording in multiple domains.  Coherence and rhythmicity is computed to identify EEG sections which may contain organized seizures.  Each channel undergoes analysis to detect presence of spike and sharp waves which have special and morphological characteristic of epileptic activity.  The routine EEG recording is converted from spacial into frequency domain.  This is then displayed comparing homologous areas to identify areas of significant asymmetry.  Algorithm to identify non-cortically generated artifact is used to separate eye movement, EMG and other artifact from the EEG    EEG FINGINGS  Background activity:   The background rhythm was characterized by   Hz activity with a 10 Hz posterior dominant alpha rhythm at 30-70 microvolts.   Symmetry and continuity: The background was continuous  Focal left sided (delta 3Hz) slowing seen throughout.    Sleep:  Normal sleep transients including sleep spindles, K complexes, vertex waves and POSTS were seen.    Activation procedures:   Hyperventilation and photic stimulation were not performed    Abnormal activity:   No epileptiform discharges, periodic discharges, lateralized rhythmic delta activity or electrographic seizures were seen.    IMPRESSION:   This is an abnormal EEG due to the focal left sided slowing seen.    No epileptiform  activity or electrographic seizures seen.    CLINICAL CORRELATION IS RECOMMENDED.     Trisha Torres MD, ARACELIS().  Neurology-Epilepsy.

## 2017-03-13 NOTE — PLAN OF CARE
Problem: Physical Therapy Goal  Goal: Physical Therapy Goal  Goals to be met by: 3/20/2017     Patient will increase functional independence with mobility by performin. Supine to sit with Stand-by Assistance  2. Sit to supine with Stand-by Assistance  3. Sit to stand transfer with Stand-by Assistance  4. Bed to chair transfer with Contact Guard Assistance using AD or No AD  5. Gait x50 feet with Stand-by Assistance using AD or No AD  6. Sitting at edge of bed x10 minutes with Supervision  7. Stand for x10 minutes with Stand-by Assistance using Ad or NO AD  8. Lower extremity exercise program x15 reps per handout, with supervision   Outcome: Ongoing (interventions implemented as appropriate)     Goals updated and appropriate to reflect pt's current mobility needs.     Rocio Espinosa, PT, DPT  [phone]  3/13/2017

## 2017-03-13 NOTE — PLAN OF CARE
Problem: Occupational Therapy Goal  Goal: Occupational Therapy Goal  Goals to be met by: 3/22/2017    Patient will increase functional independence with ADLs by performing:    Feeding with Set-up Assistance.  UE Dressing with Stand-by Assistance.  LE Dressing with moderate assistance.  Grooming while seated with Minimal Assistance.  Toileting from bedside commode with Minimal Assistance for hygiene and clothing management.   Bathing from edge of bed with Moderate Assistance.  Sitting at edge of bed x20 minutes with Stand-by Assistance.  Supine to sit with contact guard Assistance.  Stand pivot transfers with Contact Guard Assistance.  Toilet transfer to bedside commode with Minimal Assistance.   Outcome: Ongoing (interventions implemented as appropriate)  Goals remain appropriate, continue with OT POC.

## 2017-03-13 NOTE — PLAN OF CARE
SW attempted to meet with Pt and Pt wife at bedside. MD staff was in the room. SW will attempt to give rehab list later.    Maki Ahumada LMSW  Neurocritical Care   [facility]  [id number]

## 2017-03-13 NOTE — SUBJECTIVE & OBJECTIVE
Neurologic Chief Complaint: L frontal ICH    Subjective:     Interval History: Patient is seen for follow-up neurological assessment and treatment recommendations: Off cardene since yesterday am.      HPI, Past Medical, Family, and Social History remains the same as documented in the initial encounter.     Review of Systems   Constitutional: Negative for chills and fever.   HENT: Negative for drooling.    Eyes: Negative for redness.   Respiratory: Negative for cough and shortness of breath.    Cardiovascular: Negative for chest pain and palpitations.   Gastrointestinal: Negative for vomiting.   Musculoskeletal: Negative for joint swelling.   Skin: Negative for rash.   Neurological: Positive for facial asymmetry, speech difficulty and weakness. Negative for dizziness, numbness and headaches.   Psychiatric/Behavioral: Negative for agitation and behavioral problems.     Scheduled Meds:   atorvastatin  40 mg Oral Daily    heparin (porcine)  5,000 Units Subcutaneous Q8H    [START ON 3/14/2017] lisinopril  20 mg Oral Daily    metoprolol tartrate  12.5 mg Oral TID    sodium chloride 0.9%  3 mL Intravenous Q8H     Continuous Infusions:     PRN Meds:hydrALAZINE, labetalol, magnesium sulfate IVPB, magnesium sulfate IVPB, ondansetron, potassium chloride 10%, potassium chloride 10%, potassium chloride 10%, sodium phosphate IVPB, sodium phosphate IVPB, sodium phosphate IVPB    Objective:     Vital Signs (Most Recent):  Temp: 97.9 °F (36.6 °C) (03/13/17 1500)  Pulse: 83 (03/13/17 1600)  Resp: (!) 26 (03/13/17 1600)  BP: 127/60 (03/13/17 1600)  SpO2: 99 % (03/13/17 1600)       Vital Signs Range (Last 24H):  Temp:  [97.6 °F (36.4 °C)-98.2 °F (36.8 °C)]   Pulse:  [58-92]   Resp:  [14-49]   BP: (123-162)/(55-94)   SpO2:  [94 %-100 %]   Arterial Line BP: ()/(45-93)        Physical Exam   Constitutional: She is oriented to person, place, and time. She appears well-developed and well-nourished. No distress.   HENT:   Head:  Normocephalic and atraumatic.   Eyes: EOM are normal. Pupils are equal, round, and reactive to light.   Neck: Normal range of motion.   Cardiovascular: Normal rate.    Pulmonary/Chest: Effort normal. No respiratory distress.   Abdominal: Soft. She exhibits no distension.   Musculoskeletal: Normal range of motion.   Neurological: She is alert and oriented to person, place, and time. GCS eye subscore is 4 - spontaneous. GCS verbal subscore is 5 - oriented. GCS motor subscore is 6 - obeys commands.   Skin: Skin is warm and dry.   Psychiatric: She has a normal mood and affect. Her behavior is normal.   Vitals reviewed.      Neurological Exam:   LOC: alert and follows requests  Language: Expressive aphasia  Speech: Dysarthria  Orientation: Person, Place, Time  Memory: Recent memory intact, Remote memory intact, Age correct, Month correct  Visual Fields (CN II): Full  EOM (CN III, IV, VI): Full/intact  Oculocephalics: normal  Pupils (CN III, IV, VI): PERRL  Facial Sensation (CN V): Symmetric  Facial Movement (CN VII): lower weakness right lower  Hearing (CN VIII): intact bilaterally  Motor*: Arm Left:  Normal (5/5), Leg Left:   Normal (5/5), Arm Right:   Paretic:  4/5, Leg Right:   Paretic:  4/5  Cerebellar*: L sided ataxia  Sensation: intact to light touch  Tone: Arm-Left: normal; Leg-Left: normal; Arm-Right: normal; Leg-Right: normal    NIH Stroke Scale:    Level of Consciousness: 0 - alert  LOC Questions: 0 - answers both correctly  LOC Commands: 0 - performs both correctly  Best Gaze: 0 - normal  Visual: 0 - no visual loss (inconsistent visual exam.  Patient has more difficulty with lower quadrants than upper quadrants)  Facial Palsy: 0 - normal  Motor Left Arm: 0 - no drift  Motor Right Arm: 0 - no drift  Motor Left Le - drift  Motor Right Le - drift  Limb Ataxia: 1 - present in one limb  Sensory: 0 - normal  Best Language: 1 - mild to moderate aphasia  Dysarthria: 1 - mild to moderate dysarthria  Extinction  and Inattention: 0 - no neglect  NIH Stroke Scale Total: 5  Ruth Coma Scale:  Best Eye Response: 4 - spontaneous  Best Motor Response: 6 - obeys commands  Best Verbal Response: 5 - oriented        Laboratory:  CMP:     Recent Labs  Lab 03/13/17  0437 03/13/17  1358   CALCIUM 8.4*  --    ALBUMIN 3.0*  --    PROT 5.5*  --    * 138   K 3.8  --    CO2 19*  --      --    BUN 9  --    CREATININE 0.6  --    ALKPHOS 67  --    ALT 15  --    AST 20  --    BILITOT 1.5*  --      CBC:     Recent Labs  Lab 03/13/17  0334   WBC 6.98   RBC 3.82*   HGB 11.6*   HCT 33.6*   *   MCV 88   MCH 30.4   MCHC 34.5     Lipid Panel:     Recent Labs  Lab 03/11/17  1625   CHOL 254*   LDLCALC 177.6*   HDL 54   TRIG 112     Coagulation:     Recent Labs  Lab 03/11/17  1625   INR 1.0   APTT 23.1     Platelet Aggregation Study: No results for input(s): PLTAGG, PLTAGINTERP, PLTAGREGLACO, ADPPLTAGGREG in the last 168 hours.  Hgb A1C:     Recent Labs  Lab 03/11/17  1625   HGBA1C 6.1     TSH:     Recent Labs  Lab 03/11/17  1625   TSH 3.063       Diagnostic Results:  I have personally reviewed:   Findings:     CT Head OSH. Date: 03/11/17  -L frontal ICH    MRI Head. Date: 03/11/17  -3.6 x 3.1 cm T1 iso-to hyperintense and T2 hyperintense region in the left parietal lobe with associated blood fluid levels, compatible with subacute parenchymal hemorrhage.  -Diffusion restriction within this region is nonspecific and may either represent hemorrhagic infarction or the anisotropy of subacute blood components.  -No additional areas of parenchymal hemorrhage identified on the GRE sequences.      MRA Head/Neck. Date: 03/11/17  -No evidence of vascular abnormality to explain the parenchymal hemorrhage in the left parietal lobe.  -No evidence of aneurysm or stenosis involving the intracranial vessels

## 2017-03-13 NOTE — PROGRESS NOTES
[facility]  Vascular Neurology  Comprehensive Stroke Center  Progress Note      Neurologic Chief Complaint: L frontal ICH    Subjective:     Interval History: Patient is seen for follow-up neurological assessment and treatment recommendations: Off cardene since yesterday am.      HPI, Past Medical, Family, and Social History remains the same as documented in the initial encounter.     Review of Systems   Constitutional: Negative for chills and fever.   HENT: Negative for drooling.    Eyes: Negative for redness.   Respiratory: Negative for cough and shortness of breath.    Cardiovascular: Negative for chest pain and palpitations.   Gastrointestinal: Negative for vomiting.   Musculoskeletal: Negative for joint swelling.   Skin: Negative for rash.   Neurological: Positive for facial asymmetry, speech difficulty and weakness. Negative for dizziness, numbness and headaches.   Psychiatric/Behavioral: Negative for agitation and behavioral problems.     Scheduled Meds:   atorvastatin  40 mg Oral Daily    heparin (porcine)  5,000 Units Subcutaneous Q8H    [START ON 3/14/2017] lisinopril  20 mg Oral Daily    metoprolol tartrate  12.5 mg Oral TID    sodium chloride 0.9%  3 mL Intravenous Q8H     Continuous Infusions:     PRN Meds:hydrALAZINE, labetalol, magnesium sulfate IVPB, magnesium sulfate IVPB, ondansetron, potassium chloride 10%, potassium chloride 10%, potassium chloride 10%, sodium phosphate IVPB, sodium phosphate IVPB, sodium phosphate IVPB    Objective:     Vital Signs (Most Recent):  Temp: 97.9 °F (36.6 °C) (03/13/17 1500)  Pulse: 83 (03/13/17 1600)  Resp: (!) 26 (03/13/17 1600)  BP: 127/60 (03/13/17 1600)  SpO2: 99 % (03/13/17 1600)       Vital Signs Range (Last 24H):  Temp:  [97.6 °F (36.4 °C)-98.2 °F (36.8 °C)]   Pulse:  [58-92]   Resp:  [14-49]   BP: (123-162)/(55-94)   SpO2:  [94 %-100 %]   Arterial Line BP: ()/(45-93)        Physical Exam   Constitutional: She is oriented to  person, place, and time. She appears well-developed and well-nourished. No distress.   HENT:   Head: Normocephalic and atraumatic.   Eyes: EOM are normal. Pupils are equal, round, and reactive to light.   Neck: Normal range of motion.   Cardiovascular: Normal rate.    Pulmonary/Chest: Effort normal. No respiratory distress.   Abdominal: Soft. She exhibits no distension.   Musculoskeletal: Normal range of motion.   Neurological: She is alert and oriented to person, place, and time. GCS eye subscore is 4 - spontaneous. GCS verbal subscore is 5 - oriented. GCS motor subscore is 6 - obeys commands.   Skin: Skin is warm and dry.   Psychiatric: She has a normal mood and affect. Her behavior is normal.   Vitals reviewed.      Neurological Exam:   LOC: alert and follows requests  Language: Expressive aphasia  Speech: Dysarthria  Orientation: Person, Place, Time  Memory: Recent memory intact, Remote memory intact, Age correct, Month correct  Visual Fields (CN II): Full  EOM (CN III, IV, VI): Full/intact  Oculocephalics: normal  Pupils (CN III, IV, VI): PERRL  Facial Sensation (CN V): Symmetric  Facial Movement (CN VII): lower weakness right lower  Hearing (CN VIII): intact bilaterally  Motor*: Arm Left:  Normal (5/5), Leg Left:   Normal (5/5), Arm Right:   Paretic:  4/5, Leg Right:   Paretic:  4/5  Cerebellar*: L sided ataxia  Sensation: intact to light touch  Tone: Arm-Left: normal; Leg-Left: normal; Arm-Right: normal; Leg-Right: normal    NIH Stroke Scale:    Level of Consciousness: 0 - alert  LOC Questions: 0 - answers both correctly  LOC Commands: 0 - performs both correctly  Best Gaze: 0 - normal  Visual: 0 - no visual loss (inconsistent visual exam.  Patient has more difficulty with lower quadrants than upper quadrants)  Facial Palsy: 0 - normal  Motor Left Arm: 0 - no drift  Motor Right Arm: 0 - no drift  Motor Left Le - drift  Motor Right Le - drift  Limb Ataxia: 1 - present in one limb  Sensory: 0 -  normal  Best Language: 1 - mild to moderate aphasia  Dysarthria: 1 - mild to moderate dysarthria  Extinction and Inattention: 0 - no neglect  NIH Stroke Scale Total: 5  Holden Coma Scale:  Best Eye Response: 4 - spontaneous  Best Motor Response: 6 - obeys commands  Best Verbal Response: 5 - oriented        Laboratory:  CMP:     Recent Labs  Lab 03/13/17  0437 03/13/17  1358   CALCIUM 8.4*  --    ALBUMIN 3.0*  --    PROT 5.5*  --    * 138   K 3.8  --    CO2 19*  --      --    BUN 9  --    CREATININE 0.6  --    ALKPHOS 67  --    ALT 15  --    AST 20  --    BILITOT 1.5*  --      CBC:     Recent Labs  Lab 03/13/17  0334   WBC 6.98   RBC 3.82*   HGB 11.6*   HCT 33.6*   *   MCV 88   MCH 30.4   MCHC 34.5     Lipid Panel:     Recent Labs  Lab 03/11/17  1625   CHOL 254*   LDLCALC 177.6*   HDL 54   TRIG 112     Coagulation:     Recent Labs  Lab 03/11/17  1625   INR 1.0   APTT 23.1     Platelet Aggregation Study: No results for input(s): PLTAGG, PLTAGINTERP, PLTAGREGLACO, ADPPLTAGGREG in the last 168 hours.  Hgb A1C:     Recent Labs  Lab 03/11/17  1625   HGBA1C 6.1     TSH:     Recent Labs  Lab 03/11/17  1625   TSH 3.063       Diagnostic Results:  I have personally reviewed:   Findings:     CT Head OSH. Date: 03/11/17  -L frontal ICH    MRI Head. Date: 03/11/17  -3.6 x 3.1 cm T1 iso-to hyperintense and T2 hyperintense region in the left parietal lobe with associated blood fluid levels, compatible with subacute parenchymal hemorrhage.  -Diffusion restriction within this region is nonspecific and may either represent hemorrhagic infarction or the anisotropy of subacute blood components.  -No additional areas of parenchymal hemorrhage identified on the GRE sequences.      MRA Head/Neck. Date: 03/11/17  -No evidence of vascular abnormality to explain the parenchymal hemorrhage in the left parietal lobe.  -No evidence of aneurysm or stenosis involving the intracranial vessels    Assessment/Plan:     Kaylin CARRILLO  Tawanda is a 71 y.o. Female with L frontal ICH transferred from OSH for further care. Patient presents with RSW and speech difficulties with an NIH of 5. She was admitted to neuro ICU for further care. CT head confirmed L frontal ICH. MRA was negative for vascular abnormalities.     3/12/17 Patient has no complaints, has drift on right (arm>leg) with aphasia and R facial droop; remains on cardene    3/13 off cardene      * Left-sided nontraumatic intracerebral hemorrhage  Kaylin Neff is a 71 y.o. female with PMHx of HTN with L frontal ICH. DDx includes mass/tumor, bleeding infarct, hypertensive bleed  and amyloid angiopathy. Patient was seen by neurosurgeryand will f/u as outpatient with repeat imaging in 2 weeks.    Antithrombotics for secondary stroke prevention: None: Reason:  Hemorrhagic Stroke    Statins for secondary stroke prevention and hyperlipidemia, if present: Not required, given ICH.  However,  - continue Atorvastatin- 40 mg oral daily    Aggressive risk factor modification: Hypertension and Smoking, Rehab Efforts: Physical Therapy, Occupational Therapy and Speech and Language Pathology    Diagnostics: Ordered/Pending    VTE Prophylaxis: SCDs    BP Parameters: SBP <140    Nursing Orders: Neuro checks- Q1H, Swallowing evaluation by Nursing, Seizure precautions, Out of bed BID, Avoid Landaverde catheter, Pneumatic compression device, Stroke Education, Blood glucose target 100-130, Up with assistance     IV Fluids: per primary team    Hemiparesis, right  2/2 stroke  PT/OT    Essential hypertension  Stroke risk factor  SBP <140  Management per primary team      Vasogenic cerebral edema  Evident on imaging  2/2 ICH    Cigarette smoker  Stroke risk factor   on cessation    Hyperlipidemia  Stroke risk factor  .6  Atorvastatin 40      Ninfa Rodriguez MD  [facility]  Department of Vascular Neurology   [facility]

## 2017-03-13 NOTE — PHYSICIAN QUERY
PT Name: Kaylin Neff  MR #: [id number]     Physician Query Form - Documentation Clarification    Reviewer  [email or web address]      This form is a permanent document in the medical record.     Query Date: March 13, 2017  By submitting this query, we are merely seeking further clarification of documentation to reflect the severity of illness of your patient. Please utilize your independent clinical judgment when addressing the question(s) below.    (The Medical record reflects the following:)      Supporting Clinical Findings Location in Medical Record     Phosphorus = 2.7-2.1    Sodium phosphate 15 mmol in dextrose 5 % 250 ml IVPB x 2        Labs 3/12-3/13    MAR 3/13-3/13                                                                            Doctor, Please specify diagnosis or diagnoses associated with above clinical findings.    Physician Use Only      [    X  ] Hypophosphatemia   [      ] Other:  [      ] Unable to determine

## 2017-03-13 NOTE — ASSESSMENT & PLAN NOTE
Kaylin Neff is a 71 y.o. female with PMHx of HTN with L frontal ICH. DDx includes mass/tumor, bleeding infarct, hypertensive bleed  and amyloid angiopathy. Patient was seen by neurosurgeryand will f/u as outpatient with repeat imaging in 2 weeks.    Antithrombotics for secondary stroke prevention: None: Reason:  Hemorrhagic Stroke    Statins for secondary stroke prevention and hyperlipidemia, if present: Not required, given ICH.  However,  - continue Atorvastatin- 40 mg oral daily    Aggressive risk factor modification: Hypertension and Smoking, Rehab Efforts: Physical Therapy, Occupational Therapy and Speech and Language Pathology    Diagnostics: Ordered/Pending    VTE Prophylaxis: SCDs    BP Parameters: SBP <140    Nursing Orders: Neuro checks- Q1H, Swallowing evaluation by Nursing, Seizure precautions, Out of bed BID, Avoid Landaverde catheter, Pneumatic compression device, Stroke Education, Blood glucose target 100-130, Up with assistance     IV Fluids: per primary team

## 2017-03-13 NOTE — PT/OT/SLP EVAL
Speech Language Pathology Evaluation    Kaylin Neff   MRN: [id number]   Admitting Diagnosis: Left-sided nontraumatic intracerebral hemorrhage    Diet recommendations: Solid Diet Level: Regular  Liquid Diet Level: Thin Feed only when awake/alert, HOB to 90 degrees, Small bites/sips, Alternating bites/sips and 1 bite/sip at a time    SLP Treatment Date: 17  Speech Start Time: 911     Speech Stop Time: 928     Speech Total (min): 17 min       TREATMENT BILLABLE MINUTES:  Eval 9  and Treatment Swallowing Dysfunction 8    Diagnosis: Left-sided nontraumatic intracerebral hemorrhage      Past Medical History:   Diagnosis Date    Hypertension     Smokes 1 to 10 cigarettes per day 2017     History reviewed. No pertinent surgical history.    Has the patient been evaluated by SLP for swallowing? : Yes  Keep patient NPO?: No   General Precautions: Standard, aspiration, fall          Social Hx: Patient lives with spouse.    Prior diet: regular/thin.    Occupational/hobbies/homemaking: watching tv, housework.    Subjective:  Awake/alert    Pain Ratin/10  Pain Rating Post-Intervention: 0/10    Objective:        Oral Musculature Evaluation  Oral Musculature: right weakness (mild)  Dentition: present and adequate  Mucosal Quality: good  Mandibular Strength and Mobility: WFL  Oral Labial Strength and Mobility: functional retraction, functional seal, functional pursing, functional coordination  Lingual Strength and Mobility: functional protrusion, functional anterior elevation, functional lateral movement, functional strength  Velar Elevation: WFL  Buccal Strength and Mobility: WFL  Volitional Swallow: adequate laryngeal excursion noted to finger palpation  Voice Prior to PO Intake: clear     Cognitive Status:  Behavioral Observations: alert, appropriate and cooperative-  Memory and Orientation: oriented x4 pt utilized visual cue for place, immediate 5 number, 5 words, delayed 3/e min cues  Attention:  impaired.  Problem Solving: Conclusions:  100%  Reasoning:  Compare/contrast min cues  Organization: 2/3 ind'ly 3/3 cued for word finding difficulty  Sequencin/4 word set      Language: tangential    Auditory Comprehension: answers yes/no questions complex 100% and able to follow commands 2 step 100% min cues, and 3 step 2/3 cued    Verbal Expression: naming 100%, responsive naming 100% and fluent, word finding difficulty noted throughout eval. SLP provided education on word finding strategies to pt and family    Motor Speech: WFL    Voice: WFl    Augmentative Alternative Communication: no      Additional Treatment:    Pt tolerated pills whole with thin liquid via straw without overt s/s of aspiration.Liquid medication tolerated x2 with liquid wash x3 and no overt s/s of airway compromise. Pt and family reported no difficulty with meals. Swallow precautions reviewed.    Assessment:  Kaylin Neff is a 71 y.o. female with a SLP diagnosis of Aphasia and Cognitive-Linguistic Impairment.     Do you have any cultural, spiritual, Sikhism conflicts, given your current situation?: None reported    Discharge recommendations: Discharge Facility/Level Of Care Needs: rehabilitation facility     Goals:   SLP Goals        Problem: SLP Goal    Goal Priority Disciplines Outcome   SLP Goal     SLP    Description:  Speech Language Pathology Goals  Goals expected to be met by 3/20/2017      1. Pt will tolerate regular diet with thin liquids with no overt s/s of aspiration  2. Pt will participate in speech, language and cognitive evaluation to rule out cognitive linguistic deficits. GOAL MET  3. Pt will recall and utilize word finding strategies with 80% accuracy and min cues  4. Pt will complete reasoning tasks with 80% accuracy and min cues  5. Pt will participate in ongoing assessment of reading, writing, and visual spatial tasks                  Plan:   Patient to be seen Therapy Frequency: 5 x/week   Plan of Care expires:  04/10/17  Plan of Care reviewed with: patient, spouse  SLP Follow-up?: Yes              Oxana Marcum CCC-SLP   Speech Language Pathologist  Pager [phone]  03/13/2017

## 2017-03-14 LAB
ALBUMIN SERPL BCP-MCNC: 3.4 G/DL
ALP SERPL-CCNC: 75 U/L
ALT SERPL W/O P-5'-P-CCNC: 15 U/L
ANION GAP SERPL CALC-SCNC: 12 MMOL/L
AST SERPL-CCNC: 19 U/L
BASOPHILS # BLD AUTO: 0.01 K/UL
BASOPHILS NFR BLD: 0.1 %
BILIRUB SERPL-MCNC: 1.1 MG/DL
BUN SERPL-MCNC: 9 MG/DL
CALCIUM SERPL-MCNC: 9.4 MG/DL
CHLORIDE SERPL-SCNC: 111 MMOL/L
CO2 SERPL-SCNC: 19 MMOL/L
CREAT SERPL-MCNC: 0.8 MG/DL
DIFFERENTIAL METHOD: NORMAL
EOSINOPHIL # BLD AUTO: 0.2 K/UL
EOSINOPHIL NFR BLD: 2.9 %
ERYTHROCYTE [DISTWIDTH] IN BLOOD BY AUTOMATED COUNT: 13.7 %
EST. GFR  (AFRICAN AMERICAN): >60 ML/MIN/1.73 M^2
EST. GFR  (NON AFRICAN AMERICAN): >60 ML/MIN/1.73 M^2
GLUCOSE SERPL-MCNC: 109 MG/DL
HCT VFR BLD AUTO: 37.5 %
HGB BLD-MCNC: 12.6 G/DL
LYMPHOCYTES # BLD AUTO: 2.7 K/UL
LYMPHOCYTES NFR BLD: 39 %
MAGNESIUM SERPL-MCNC: 1.8 MG/DL
MAGNESIUM SERPL-MCNC: 2.6 MG/DL
MCH RBC QN AUTO: 29.6 PG
MCHC RBC AUTO-ENTMCNC: 33.6 %
MCV RBC AUTO: 88 FL
MONOCYTES # BLD AUTO: 0.6 K/UL
MONOCYTES NFR BLD: 9.3 %
NEUTROPHILS # BLD AUTO: 3.3 K/UL
NEUTROPHILS NFR BLD: 48.6 %
PHOSPHATE SERPL-MCNC: 3.7 MG/DL
PLATELET # BLD AUTO: 160 K/UL
PMV BLD AUTO: 10.6 FL
POCT GLUCOSE: 105 MG/DL (ref 70–110)
POCT GLUCOSE: 98 MG/DL (ref 70–110)
POTASSIUM SERPL-SCNC: 3.8 MMOL/L
POTASSIUM SERPL-SCNC: 4.7 MMOL/L
PROT SERPL-MCNC: 6.5 G/DL
RBC # BLD AUTO: 4.26 M/UL
SODIUM SERPL-SCNC: 137 MMOL/L
SODIUM SERPL-SCNC: 142 MMOL/L
WBC # BLD AUTO: 6.89 K/UL

## 2017-03-14 PROCEDURE — 97535 SELF CARE MNGMENT TRAINING: CPT

## 2017-03-14 PROCEDURE — 84295 ASSAY OF SERUM SODIUM: CPT | Mod: 91

## 2017-03-14 PROCEDURE — 97530 THERAPEUTIC ACTIVITIES: CPT

## 2017-03-14 PROCEDURE — 94760 N-INVAS EAR/PLS OXIMETRY 1: CPT

## 2017-03-14 PROCEDURE — 99233 SBSQ HOSP IP/OBS HIGH 50: CPT | Mod: ,,, | Performed by: PHYSICIAN ASSISTANT

## 2017-03-14 PROCEDURE — 25000003 PHARM REV CODE 250: Performed by: PSYCHIATRY & NEUROLOGY

## 2017-03-14 PROCEDURE — 97116 GAIT TRAINING THERAPY: CPT

## 2017-03-14 PROCEDURE — 25000003 PHARM REV CODE 250: Performed by: ANESTHESIOLOGY

## 2017-03-14 PROCEDURE — 84100 ASSAY OF PHOSPHORUS: CPT

## 2017-03-14 PROCEDURE — 92526 ORAL FUNCTION THERAPY: CPT

## 2017-03-14 PROCEDURE — 85025 COMPLETE CBC W/AUTO DIFF WBC: CPT

## 2017-03-14 PROCEDURE — 36415 COLL VENOUS BLD VENIPUNCTURE: CPT

## 2017-03-14 PROCEDURE — 83735 ASSAY OF MAGNESIUM: CPT

## 2017-03-14 PROCEDURE — 84132 ASSAY OF SERUM POTASSIUM: CPT

## 2017-03-14 PROCEDURE — 25000003 PHARM REV CODE 250: Performed by: PHYSICIAN ASSISTANT

## 2017-03-14 PROCEDURE — 20600001 HC STEP DOWN PRIVATE ROOM

## 2017-03-14 PROCEDURE — 63600175 PHARM REV CODE 636 W HCPCS: Performed by: PSYCHIATRY & NEUROLOGY

## 2017-03-14 PROCEDURE — 92507 TX SP LANG VOICE COMM INDIV: CPT

## 2017-03-14 PROCEDURE — 99232 SBSQ HOSP IP/OBS MODERATE 35: CPT | Mod: ,,, | Performed by: NURSE PRACTITIONER

## 2017-03-14 PROCEDURE — 83735 ASSAY OF MAGNESIUM: CPT | Mod: 91

## 2017-03-14 PROCEDURE — 84295 ASSAY OF SERUM SODIUM: CPT

## 2017-03-14 PROCEDURE — 80053 COMPREHEN METABOLIC PANEL: CPT

## 2017-03-14 RX ORDER — METOPROLOL TARTRATE 25 MG/1
25 TABLET, FILM COATED ORAL 3 TIMES DAILY
Status: DISCONTINUED | OUTPATIENT
Start: 2017-03-14 | End: 2017-03-16 | Stop reason: HOSPADM

## 2017-03-14 RX ORDER — HYDRALAZINE HYDROCHLORIDE 20 MG/ML
10 INJECTION INTRAMUSCULAR; INTRAVENOUS EVERY 4 HOURS PRN
Status: DISCONTINUED | OUTPATIENT
Start: 2017-03-14 | End: 2017-03-16 | Stop reason: HOSPADM

## 2017-03-14 RX ORDER — LABETALOL HYDROCHLORIDE 5 MG/ML
10 INJECTION, SOLUTION INTRAVENOUS EVERY 4 HOURS PRN
Status: DISCONTINUED | OUTPATIENT
Start: 2017-03-14 | End: 2017-03-16 | Stop reason: HOSPADM

## 2017-03-14 RX ADMIN — HEPARIN SODIUM 5000 UNITS: 5000 INJECTION, SOLUTION INTRAVENOUS; SUBCUTANEOUS at 09:03

## 2017-03-14 RX ADMIN — LISINOPRIL 20 MG: 20 TABLET ORAL at 08:03

## 2017-03-14 RX ADMIN — METOPROLOL TARTRATE 25 MG: 25 TABLET, FILM COATED ORAL at 09:03

## 2017-03-14 RX ADMIN — Medication 3 ML: at 09:03

## 2017-03-14 RX ADMIN — LABETALOL HYDROCHLORIDE 10 MG: 5 INJECTION, SOLUTION INTRAVENOUS at 07:03

## 2017-03-14 RX ADMIN — LABETALOL HYDROCHLORIDE 10 MG: 5 INJECTION, SOLUTION INTRAVENOUS at 04:03

## 2017-03-14 RX ADMIN — Medication 12.5 MG: at 06:03

## 2017-03-14 RX ADMIN — HEPARIN SODIUM 5000 UNITS: 5000 INJECTION, SOLUTION INTRAVENOUS; SUBCUTANEOUS at 06:03

## 2017-03-14 RX ADMIN — Medication 3 ML: at 06:03

## 2017-03-14 RX ADMIN — MAGNESIUM SULFATE IN WATER 2 G: 40 INJECTION, SOLUTION INTRAVENOUS at 06:03

## 2017-03-14 RX ADMIN — METOPROLOL TARTRATE 25 MG: 25 TABLET, FILM COATED ORAL at 02:03

## 2017-03-14 RX ADMIN — HEPARIN SODIUM 5000 UNITS: 5000 INJECTION, SOLUTION INTRAVENOUS; SUBCUTANEOUS at 02:03

## 2017-03-14 RX ADMIN — Medication 3 ML: at 02:03

## 2017-03-14 RX ADMIN — ATORVASTATIN CALCIUM 40 MG: 20 TABLET, FILM COATED ORAL at 08:03

## 2017-03-14 RX ADMIN — POTASSIUM CHLORIDE 40 MEQ: 20 SOLUTION ORAL at 06:03

## 2017-03-14 NOTE — PLAN OF CARE
SW met with Pt and Pt family at bedside. Discussed therapy recs and gave list. They reported wanting [facility] and [facility] as choices. They will review for others if needed. Asked if Pt could get a shower. Referred them to their new RN. Advised new CM and SW will follow tomorrow to continue discharge planning.    CLIFF sent email to [facility] to complete referrals.    Maki Ahumada LMSW  Neurocritical Care   [facility]  [phone]

## 2017-03-14 NOTE — PLAN OF CARE
Problem: Physical Therapy Goal  Goal: Physical Therapy Goal  Goals to be met by: 3/20/2017     Patient will increase functional independence with mobility by performin. Supine to sit with Stand-by Assistance  2. Sit to supine with Stand-by Assistance  3. Sit to stand transfer with Stand-by Assistance  4. Bed to chair transfer with Contact Guard Assistance using AD or No AD  5. Gait x50 feet with Stand-by Assistance using AD or No AD  6. Sitting at edge of bed x10 minutes with Supervision  7. Stand for x10 minutes with Stand-by Assistance using Ad or NO AD  8. Lower extremity exercise program x15 reps per handout, with supervision   Outcome: Ongoing (interventions implemented as appropriate)     Pt progressing well with mobility. Goals updated and appropriate to reflect pt's current mobility needs.     Rocio Espinosa, PT, DPT  [phone]  3/14/2017

## 2017-03-14 NOTE — NURSING TRANSFER
Nursing Transfer Note      3/14/2017     Transfer From: [facility] 7095 to [facility] 742A    Transfer via wheelchair    Transfer with cardiac monitoring    Transported by RN    Medicines sent: labetolol    Chart send with patient: Yes    Notified: spouse    Patient reassessed at: 15:15, 3/14/17     Upon arrival to floor: cardiac monitor applied, patient oriented to room, call bell in reach and bed in lowest position

## 2017-03-14 NOTE — PT/OT/SLP PROGRESS
"Speech Language Pathology  Treatment    Kaylin Neff   MRN: [id number]   Admitting Diagnosis: Left-sided nontraumatic intracerebral hemorrhage    Diet recommendations: Solid Diet Level: Regular  Liquid Diet Level: Thin Feed only when awake/alert, HOB to 90 degrees, Small bites/sips, 1 bite/sip at a time, Meds whole 1 at a time, Eliminate distractions and Avoid talking while eating    SLP Treatment Date: 17  Speech Start Time: 835     Speech Stop Time: 855     Speech Total (min): 20 min       TREATMENT BILLABLE MINUTES:  Speech Therapy Individual 12 and Treatment Swallowing Dysfunction 8    Has the patient been evaluated by SLP for swallowing? : Yes  Keep patient NPO?: No   General Precautions: Standard, aspiration, fall, seizure          Subjective:  Pt alert, finishing breakfast when SLP entered    Pain Ratin/10              Pain Rating Post-Intervention: 0/10    Objective:   Patient found with: blood pressure cuff, peripheral IV, pulse ox (continuous), telemetry    Pt cooperative with all tasks.  No overt s/s aspiration with regular breakfast tray.  Functional intake management observed.  Reading at sentence level was WFL.  With writing assessment, pt noted to have difficulty planning sequence to reach for pen with either hand.  When pen placed in right hand at pt's request, she was able to  it though unable to write any functional letters.  When pen was placed in her L hand, pt with continued difficulty initiating writing stating, "I can't figure out which way the S goes".  Given model, pt unable to  written letter.  Given increased time, pt able to copy simple shapes with L hand.  Pt did eventual complete name following writing of initial letter by SLP.  Pt verbalized disappointment with difficulty stating, "I though that would be a piece of cake."  SLP and  provided encouragement.  Ongoing education provided on progress and POC.  Pt verbalized understanding.  Session ended early  " toileting needs.  White board updated.         Assessment:  Kaylin Neff is a 71 y.o. female with a medical diagnosis of Left-sided nontraumatic intracerebral hemorrhage and presents with aphasia, cognitive linguistic impairment, agraphia.    Discharge recommendations: Discharge Facility/Level Of Care Needs: rehabilitation facility     Goals:   SLP Goals        Problem: SLP Goal    Goal Priority Disciplines Outcome   SLP Goal     SLP Ongoing (interventions implemented as appropriate)   Description:  Speech Language Pathology Goals  Goals expected to be met by 3/20/2017      1. Pt will tolerate regular diet with thin liquids with no overt s/s of aspiration  2. Pt will participate in speech, language and cognitive evaluation to rule out cognitive linguistic deficits. GOAL MET  3. Pt will recall and utilize word finding strategies with 80% accuracy and min cues  4. Pt will complete reasoning tasks with 80% accuracy and min cues  5. Pt will participate in ongoing assessment of reading, writing, and visual spatial tasks                  Plan:   Patient to be seen Therapy Frequency: 5 x/week   Plan of Care expires: 04/10/17  Plan of Care reviewed with: patient, spouse  SLP Follow-up?: Yes              LINDSAY Finley, CCC-SLP  03/14/2017

## 2017-03-14 NOTE — PROGRESS NOTES
ICU Progress Note  Neurocritical Care    Admit Date: 3/11/2017  LOS: 3    CC: Left-sided nontraumatic intracerebral hemorrhage    Code Status: Full Code     SUBJECTIVE:     Interval History/Significant Events: admitted on 3/11/2017 for L ICH with associated R hemiparesis, aphasia and dysarthria    No significant events overnight. Plan is to [facility] Stroke Service for optimization of care.     Review of Symptoms: denies all   Constitutional: Denies fevers or chills.  Pulmonary: Denies shortness of breath or cough.  Cardiology: Denies chest pain or palpitations.  GI: Denies abdominal pain or constipation.  Neurologic: Denies new weakness, headaches, or paresthesias.     Medications:  Continuous Infusions:   Scheduled Meds:   atorvastatin  40 mg Oral Daily    heparin (porcine)  5,000 Units Subcutaneous Q8H    lisinopril  20 mg Oral Daily    metoprolol tartrate  25 mg Oral TID    sodium chloride 0.9%  3 mL Intravenous Q8H     PRN Meds:.hydrALAZINE, labetalol, ondansetron, sodium phosphate IVPB, sodium phosphate IVPB, sodium phosphate IVPB    OBJECTIVE:   Vital Signs (Most Recent):   Temp: 98 °F (36.7 °C) (03/14/17 1100)  Pulse: 63 (03/14/17 1100)  Resp: (!) 28 (03/14/17 1100)  BP: 132/60 (03/14/17 1100)  SpO2: 97 % (03/14/17 1100)    Vital Signs (24h Range):   Temp:  [97.7 °F (36.5 °C)-99 °F (37.2 °C)] 98 °F (36.7 °C)  Pulse:  [54-78] 63  Resp:  [13-33] 28  SpO2:  [94 %-99 %] 97 %  BP: ()/(58-75) 132/60  Arterial Line BP: (123)/(93) 123/93    ICP/CPP (Last 24h):        I & O (Last 24h):   Intake/Output Summary (Last 24 hours) at 03/14/17 1636  Last data filed at 03/14/17 1400   Gross per 24 hour   Intake             2410 ml   Output              790 ml   Net             1620 ml     Physical Exam:  GA: Awake, Alert, Oriented, follows commands, + dysarthria comfortable, no acute distress.   HEENT: No scleral icterus or JVD.   Pulmonary: Clear to auscultation A/P/L. No wheezing, crackles, or rhonchi.  Cardiac: RRR  S1 & S2 w/o rubs/murmurs/gallops.   Abdominal: Bowel sounds present x 4. No appreciable hepatosplenomegaly.  Skin: No jaundice, rashes, or visible lesions.  Neuro:  --GCS: E4 V5 M6  --Mental Status:   Awake, Alert, Oriented, follows commands, + dysarthria  --CN II-XII grossly intact.   --Pupils 4mm, PERRL.   --Corneal reflex, gag, cough intact.  --LUE strength: 5/5  --RUE strength: 4/5 Improving  --LLE strength: 5/5  --RLE strength: 4/5 improving     Vent Data:        Lines/Drains/Airway:          Nutrition/Tube Feeds (if NPO state why):     Labs:  ABG: No results for input(s): PH, PO2, PCO2, HCO3, POCSATURATED, BE in the last 24 hours.  BMP:  Recent Labs  Lab 03/14/17  0328 03/14/17  1308     142 137   K 3.8 4.7   *  --    CO2 19*  --    BUN 9  --    CREATININE 0.8  --      --    MG 1.8 2.6   PHOS 3.7  --      LFT: Lab Results   Component Value Date    AST 19 03/14/2017    ALT 15 03/14/2017    ALKPHOS 75 03/14/2017    BILITOT 1.1 (H) 03/14/2017    ALBUMIN 3.4 (L) 03/14/2017    PROT 6.5 03/14/2017     CBC:   Lab Results   Component Value Date    WBC 6.89 03/14/2017    HGB 12.6 03/14/2017    HCT 37.5 03/14/2017    MCV 88 03/14/2017     03/14/2017     Microbiology x 7d:   Microbiology Results (last 7 days)     ** No results found for the last 168 hours. **        Imaging:  No new imaging   I personally reviewed the above image.    ASSESSMENT/PLAN:     Active Hospital Problems    Diagnosis    *Left-sided nontraumatic intracerebral hemorrhage    Intracranial bleed    Hyperlipidemia    Vasogenic cerebral edema    Cigarette smoker    Hemiparesis, right    Essential hypertension      Neuro:   Left posterior frontal/parietal intraparenchymal hemorrhage.  --Continue Neuro checks q 1hr  -- Vascular Neurology continues to follow  -- CT Scan 3/13 No significant change from prior. Evolving acute/recent left posterior frontal/parietal intraparenchymal hemorrhage  -- SBP goal <  150  --PT/OT/Speech.  -- Atorvastatin 40 mg daily   -- Pending further recommendations via Stroke Service     Cerebral Edema  -- Continue to monitor with serial CT scans    Pulmonary:   -- Daily CXR  -- Daily ABGs     Cardiac:   Hypertension  -- Continue to monitor HR and BP   --SBP goal < 150  -- Continue Lisinopril 20 mg daily   -- Continue Metoprolol 25 mg TID    Renal:   --Continue to monitor I/O  --Continue to monitor BUN/Cr  -- BUN 9; Creatinine 0.8    ID:   -- Afebrile   -- No leukocytosis     Hem/Onc:   --continue to monitor H/H    Endocrine:   --Continue to monitor BG    Fluids/Electrolytes/Nutrition/GI:   - Regular     -- Continue to monitor and replace electrolytes    PPX  -DVT: : heparin 5000 units q 8, VARUN, SCD    Level III  I have spent 35 min with this patient, with over 50% of this time spent coordinating care and speaking with the family      Lucrecia Guzman PA-C  Neurocritical Care  [phone]

## 2017-03-14 NOTE — PLAN OF CARE
Transition of care note    Transfer to Stroke room 742A    DX: Intracranial bleed [I62.9]  Intracranial bleed [I62.9]  Intracranial bleed [I62.9]     Family status: Extended Emergency Contact Information  Primary Emergency Contact: Kvng Neff  Address: [address] [place], [place] [address]DEEPTHI JOSHI [id number] [place] [address]  Home Phone: [phone]  Mobile Phone: [phone]  Relation: Spouse     Pt/OT: Discharge Facility/Level Of Care Needs: rehabilitation facility     Insurance: Payor: HU[employer] MANAGED MEDICARE / Plan: HU[employer] MEDICARE PPO / Product Type: Medicare Advantage /      PCP: Martha Sandhu MD     Pharmacy: No Pharmacies Listed  (This CM spoke with patient at bedside,who states she does not have a pharmacy, if she needs medication she will get at that time.)    Future Appointments  Date Time Provider Department Center   3/27/2017 2:15 PM [facility] MRI2 [facility]   3/27/2017 3:20 PM Lacey Omer PA-C [facility] NEUROS7 [facility]  [phone]

## 2017-03-14 NOTE — MEDICAL/APP STUDENT
Consult Note  Physical Medicine & Rehab    Consult Requested By:  Benja Zafar MD      Admit Date:  3/11/2017  Admitting Diagnosis:  Intracranial bleed [I62.9]    Reason for Consult:  Assess rehab needs s/p L ICH    SUBJECTIVE:   History of Present Illness: The patient is a 71 y.o female with PMHx HTN presented to OSH with slurred speech, R arm weakness, and balance issues. Patient was last seen normal at 9 am on 03/11.  She was seen via telemedicine. CTH indicated L ICH . She was transferred to [facility] on 3/12 for NS and NCC consultation.  She continues to have R hemiparesis, aphasia and dysarthria. Patient admitted to [facility] for further monitoring and evaluation, no surgical intervention indicated at this time.    Current Functional Status: Evaluated by therapy. Sit to stand CGA and transfers Stand Pivot  Min A. Ambulated 4 steps to chair; added assist with turn/pivot . LBD Set-Up A , Mod A UBD Mod A       Functional History: Patient lives with  in [facility] with 0 CHARLIE . Prior to admission, she was independent with ADLs and mobility. DME: none    Review of Systems  Constitutional: Negative for chills and fever.   HENT: Negative for ear discharge and ear pain. Negative for trauma  Eyes: Negative for pain and redness.   Respiratory: Negative for cough and shortness of breath.   Cardiovascular: Negative for chest pain and palpitations. No edema .  GI: Denies nausea/vomitting,  Negative for abdominal pain and constipation.   : Negative for difficulty urinating , dysuria and  incontinence  Musculoskeletal: Negative for myalgia . Negative for arthralgia  Skin: Warm and dry. No jaundice. No visible rashes or visible lesions   Neurological: Positive for facial asymmetry, speech difficulty and weakness. Negative for dizziness, numbness and headaches.   Psychiatric/Behavioral: Negative for agitation and confusion.     Past Medical History:   Diagnosis Date    Hypertension     Smokes 1 to 10 cigarettes per day 03/11/2017      History reviewed. No pertinent surgical history.  History reviewed. No pertinent family history.  Social History   Substance Use Topics    Smoking status: Current Some Day Smoker     Years: 55.00     Types: Cigarettes    Smokeless tobacco: None    Alcohol use None     Review of patient's allergies indicates:  No Known Allergies     Scheduled Medications:   atorvastatin  40 mg Oral Daily    heparin (porcine)  5,000 Units Subcutaneous Q8H    lisinopril  20 mg Oral Daily    metoprolol tartrate  25 mg Oral TID    sodium chloride 0.9%  3 mL Intravenous Q8H     PRN Medications:  hydrALAZINE, labetalol, ondansetron, sodium phosphate IVPB, sodium phosphate IVPB, sodium phosphate IVPB    OBJECTIVE:     Vital Signs (Most Recent)  Temp: 98 °F (36.7 °C) (03/14/17 1100)  Pulse: 63 (03/14/17 1100)  Resp: (!) 28 (03/14/17 1100)  BP: 132/60 (03/14/17 1100)  SpO2: 97 % (03/14/17 1100)    Vital Signs Range (Last 24H):  Temp:  [97.7 °F (36.5 °C)-99 °F (37.2 °C)]   Pulse:  [54-83]   Resp:  [13-33]   BP: ()/(55-75)   SpO2:  [94 %-100 %]   Arterial Line BP: ()/(54-93)     Physical Exam:  *Vital signs reviewed.   Constitutional: She appears well-developed and well-nourished.   Skin: Color and texture normal. Warm and dry. No jaundice. No visible rashes or visible lesions.  HEENT: Normocephalic and atraumatic.   Pulmonary/Chest: Effort normal. No respiratory distress   Cardiac: Normal heart rate. Extremities: No calf tenderness. Distal pulses intact. No edema.   GI: Negative nausea/vomitting or  abd pain  : Denies dysuria or  incontinence  Abdomen: Soft, non-tender and non-distended.  Musculoskeletal: Gross motor intact. Normal range of motion.   Neurologic:  - Mental Status: AAOx3. Follows commands.   - Speech and language: Expressive aphasia , +dysarthria    -GCS: eye 4; verbal 5; motor 6.    - Motor:  RUE: 4-/5. LUE: 5/5. RLE: 5/5. LLE: 4-/5. + Drift Rt  arm  - Tone: Normal.    - Sensory: Intact to light touch  and pin prick.   Behavioral/Psych: Calm and cooperative    Diagnostic Results:  Imaging results reviewed  Lab results reviewed    ASSESSMENT/PLAN:      Kaylin Neff is a 71 y.o. female admitted on 3/11/2017 for L ICH with associated R hemiparesis, aphasia and dysarthria.  Patient admitted to [facility] for BP management , further monitoring and evaluation, no surgical intervention indicated at this time. MRA was negative for vascular abnormalities. She remains in [facility] on  [facility]  For BP management.     PM&R consulted to assess rehab needs s/p L ICH.     Functional status: Sit to stand CGA and transfers Stand Pivot  Min A. Ambulated 4 steps to chair; added assist with turn/pivot . LBD Set-Up A , Mod A UBD Mod A    Cognitive/Speech/Language status:  Aphasia, cognitive linguistic impairment, agraphia.  Nutrition/Swallow Status:  Speech recommended regular diet and thin liquids.    -  Reviewed discharge options with patient (inpatient rehab, SNF, home health therapy, and outpatient therapy).  Encourage participation with therapy.  -  Recommendations  · PT and OT evaluate and treat.   · SLP cognitive and speech evaluate and treat.   · Mobility, OOB in chair at least 3 hours per day, and early ambulation as appropriate.  · Establish consistent sleep-wake cycle and monitor for sleep disturbances.  · DVT prophylaxis:  SCD.    Patient with good goals for rehab; however, patient and her  prefer [facility], which is closer to home.  Will sign off.  Please call with questions/concerns or re-consult if situation changes.    I have reviewed the notes and assessments performed by Shanae Blanchard, I concur with her/his documentation of Kaylin Neff.    CODIE Cardona, FNP-C    Physical Medicine & Rehabilitation   03/14/2017  Spectralink: [id number]  Collaborating Physician : Stefan Rubio MD

## 2017-03-14 NOTE — PLAN OF CARE
Problem: SLP Goal  Goal: SLP Goal  Speech Language Pathology Goals  Goals expected to be met by 3/20/2017      1. Pt will tolerate regular diet with thin liquids with no overt s/s of aspiration  2. Pt will participate in speech, language and cognitive evaluation to rule out cognitive linguistic deficits. GOAL MET  3. Pt will recall and utilize word finding strategies with 80% accuracy and min cues  4. Pt will complete reasoning tasks with 80% accuracy and min cues  5. Pt will participate in ongoing assessment of reading, writing, and visual spatial tasks   Outcome: Ongoing (interventions implemented as appropriate)  Pt making steady progress.  Goals remain appropriate. LINDSAY Finley, CCC/SLP  3/14/2017

## 2017-03-14 NOTE — PLAN OF CARE
Problem: Occupational Therapy Goal  Goal: Occupational Therapy Goal  Goals to be met by: 3/22/2017; Revisions made 3/14    Patient will increase functional independence with ADLs by performing:    Feeding with Set-up Assistance and min(a) with AD as needed.  UE Dressing while seated with Stand-by Assistance.  LE Dressing with moderate assistance.  Grooming while standing with Minimal Assistance.  Toileting from bedside commode with Minimal Assistance for hygiene and clothing management.   Bathing from edge of bed with Moderate Assistance.  Sitting at edge of bed x20 minutes with Stand-by Assistance.  Supine to sit with contact guard Assistance.  Stand pivot transfers with Contact Guard Assistance. Met 3/14  -Revised: with supervision  Toilet transfer to bedside commode with SBA.   Outcome: Ongoing (interventions implemented as appropriate)  Goals updated. Pt progressing well. BESSIE Gonzalez 3/14/2017

## 2017-03-14 NOTE — PT/OT/SLP PROGRESS
"Physical Therapy  Treatment    Kaylin Neff   MRN: [id number]   Admitting Diagnosis: Left-sided nontraumatic intracerebral hemorrhage    PT Received On: 17  PT Start Time: 1537     PT Stop Time: 1602    PT Total Time (min): 25 min       Billable Minutes:  Gait Training 10 and Therapeutic Activity 15    Treatment Type: Treatment  PT/PTA: PT           General Precautions: Standard, aspiration, fall, seizure    Do you have any cultural, spiritual, Caodaism conflicts, given your current situation?: None noted    Subjective:  Communicated with RN (Martha) prior to session.    "Oh, that is a good idea. I need to work on walking normally. I know I have to check the right side."    Pain Ratin/10  Pain Rating Post-Intervention: 0/10    Objective:   Patient found with: telemetry    Functional Mobility:  Bed Mobility: Pt req VC/TCs for attention to placement of R hand/UE; pt with dis-coordinated/uncontrolled movements.  Rolling/Turning to Left: Contact guard assistance  Scooting/Bridging: Contact Guard Assistance  Supine to Sit: Contact Guard Assistance  Sit to Supine: Contact Guard Assistance    Transfers:  Sit <> Stand Assistance: Contact Guard Assistance, Minimum Assistance (from EOB, from W/C, from toilet)  Sit <> Stand Assistive Device: No Assistive Device  Bed <> Chair Technique: Stand Pivot  Bed <> Chair Assistance: Minimum Assistance (from EOB<>W/C<>toilet<>bed)  Bed <> Chair Assistive Device: No Assistive Device  Toilet Transfer Assistance: Minimum Assistance  Toilet Transfer Assistive Device: No Assistive Device    Gait:   Gait Distance: x15 feet; x20 feet; x12 feet with CGA/min A from PT for appropriate sequencing, postural awareness, and attention to R UE 2/2 flexed position near body. Pt able to straighten arms and attempt arm swing in conjuction with B LE movement; however, pt req min A for appropriate sequencing.  Assistance 1: Contact Guard Assistance, Minimum assistance (spouse following with " W/C)  Gait Assistive Device: No device  Gait Pattern: 2-point gait  Gait Deviation(s): decreased dileep, decreased weight-shifting ability    Balance:   Static Sit: GOOD-: Takes MODERATE challenges from all directions but inconsistently  Dynamic Sit: GOOD-: Maintains balance through MODERATE excursions of active trunk movement,     Static Stand: FAIR: Maintains without assist but unable to take challenges  Dynamic stand: FAIR+: Needs CLOSE SUPERVISION during gait and is able to right self with minor LOB     Pt continues to demo inattention to R UE/R side during functional tasks. Pt instructed on continued attempts to utilize R hand in functional tasks. Pt req min A to turn off light switch using R hand with stabilization at R elbow.     Therapeutic Activities and Exercises:  PT assisted pt to toilet and pt able to perform self care with close SBA/CGA for sitting balance on low toilet surface. Pt and family receptive to education on bed mobility and need for staff assist to transfer to commode/chair. Questions/concerns addressed within PT scope of practice.      AM-PAC 6 CLICK MOBILITY  How much help from another person does this patient currently need?   1 = Unable, Total/Dependent Assistance  2 = A lot, Maximum/Moderate Assistance  3 = A little, Minimum/Contact Guard/Supervision  4 = None, Modified [place]/Independent    Turning over in bed (including adjusting bedclothes, sheets and blankets)?: 3  Sitting down on and standing up from a chair with arms (e.g., wheelchair, bedside commode, etc.): 3  Moving from lying on back to sitting on the side of the bed?: 3  Moving to and from a bed to a chair (including a wheelchair)?: 3  Need to walk in hospital room?: 3  Climbing 3-5 steps with a railing?: 2  Total Score: 17    AM-PAC Raw Score CMS G-Code Modifier Level of Impairment Assistance   6 % Total / Unable   7 - 9 CM 80 - 100% Maximal Assist   10 - 14 CL 60 - 80% Moderate Assist   15 - 19 CK 40 - 60%  Moderate Assist   20 - 22 CJ 20 - 40% Minimal Assist   23 CI 1-20% SBA / CGA   24 CH 0% Independent/ Mod I     Patient left HOB elevated with all lines intact, call button in reach, RN notified and spouse/family present.    Assessment:  Kaylin Neff is a 71 y.o. female with a medical diagnosis of Left-sided nontraumatic intracerebral hemorrhage and presents with significant improvement in functional mobility and activity tolerance; pt continues to req min A for OOB mobility and gait training. Pt demo significant inattention to R and poor awareness of R UE/LE; pt is a high fall risk at this time. Pt is unable to act as (I) caregiver for self and spouse. Pt is not at her baseline and will benefit from Rehab prior to D/C to return to (I) PLOF. Patient will benefit from continued PT services to address:    Rehab identified problem list/impairments: Rehab identified problem list/impairments: weakness, impaired endurance, impaired self care skills, impaired functional mobilty, gait instability, impaired balance, visual deficits, impaired cognition, decreased coordination, decreased upper extremity function, decreased lower extremity function    Rehab potential is good.    Activity tolerance: Good    Discharge recommendations: Discharge Facility/Level Of Care Needs: rehabilitation facility     Barriers to discharge: Barriers to Discharge: Inaccessible home environment, Decreased caregiver support    Equipment recommendations: Equipment Needed After Discharge: bedside commode, shower chair     GOALS:   Physical Therapy Goals        Problem: Physical Therapy Goal    Goal Priority Disciplines Outcome Goal Variances Interventions   Physical Therapy Goal     PT/OT, PT Ongoing (interventions implemented as appropriate)     Description:  Goals to be met by: 3/20/2017     Patient will increase functional independence with mobility by performin. Supine to sit with Stand-by Assistance  2. Sit to supine with Stand-by  Assistance  3. Sit to stand transfer with Stand-by Assistance  4. Bed to chair transfer with Contact Guard Assistance using AD or No AD  5. Gait x50 feet with Stand-by Assistance using AD or No AD  6. Sitting at edge of bed x10 minutes with Supervision  7. Stand for x10 minutes with Stand-by Assistance using Ad or NO AD  8. Lower extremity exercise program x15 reps per handout, with supervision              PLAN:    Patient to be seen 6 x/week  to address the above listed problems via gait training, therapeutic activities, therapeutic exercises, neuromuscular re-education  Plan of Care expires: 04/11/17  Plan of Care reviewed with: patient, spouse, family     Rocio Espinosa, PT, DPT  [phone]  3/14/2017

## 2017-03-14 NOTE — PT/OT/SLP PROGRESS
"Occupational Therapy  Treatment    Kaylin Neff   MRN: [id number]   Admitting Diagnosis: Left-sided nontraumatic intracerebral hemorrhage    OT Date of Treatment: 17   OT Start Time: 751  OT Stop Time: 830  OT Total Time (min): 39 min    Billable Minutes:  Self Care/Home Management 30 and Therapeutic Activity 9    General Precautions: Standard, fall, aspiration, seizure, cardiac diet  Orthopedic Precautions: N/A  Braces: N/A    Do you have any cultural, spiritual, Druze conflicts, given your current situation?: None    Subjective:  Communicated with RN prior to session.  Pt's spouse present throughout. Pt reported "I can't do it with that arm"    Pain Ratin/10  Pain Rating Post-Intervention: 0/10    Objective:  Patient found with: blood pressure cuff, peripheral IV, telemetry, SCD, pulse ox (continuous)     Functional Mobility:  Bed Mobility:  Scooting/Bridging: Contact Guard Assistance (to EOB)  Supine to Sit: Moderate Assistance, WIth side rail    Transfers:  Sit <> Stand Assistance: Contact Guard Assistance  Sit <> Stand Assistive Device: No Assistive Device  Bed <> Chair Technique: Stand Pivot  Bed <> Chair Transfer Assistance: Minimum Assistance  Bed <> Chair Assistive Device: No Assistive Device    Functional Ambulation: Min(a) for 4 steps to chair; added assist with turn/pivot and for lines    Activities of Daily Living:  Feeding Level of Assistance: Set-up Assistance, Moderate assistance (hand over hand for R hand as assist to scooping and stabilization; pt would benefit from plate guard)  UE Dressing Level of Assistance: Moderate assistance (seated EOB to don gown as jacket; re-edu on rebeca technique)  LE Dressing Level of Assistance: Set-up Assistance, Moderate assistance (assist for R UE as support and assist; assist for R LE support to complete)  Grooming Position: Seated, bedside chair  Grooming Level of Assistance: Moderate assistance (brush hair; wash face)    Additional:  -Pt alert " and oriented x3 upon arrival; provided with further verbal daily orientation  -Completed EOB activity with edu on RUE weightbearing and placement for ADLs/self care  -Pt demo extinction of R with B coordination task; 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   1 = Unable, Total/Dependent Assistance  2 = A lot, Maximum/Moderate Assistance  3 = A little, Minimum/Contact Guard/Supervision  4 = None, Modified [place]/Independent    Putting on and taking off regular lower body clothing? : 2  Bathing (including washing, rinsing, drying)?: 2  Toileting, which includes using toilet, bedpan, or urinal? : 2  Putting on and taking off regular upper body clothing?: 2  Taking care of personal grooming such as brushing teeth?: 2  Eating meals?: 2  Total Score: 12     AM-PAC Raw Score CMS G-Code Modifier Level of Impairment Assistance   6 % Total / Unable   7 - 9 CM 80 - 100% Maximal Assist   10 - 14 CL 60 - 80% Moderate Assist   15 - 19 CK 40 - 60% Moderate Assist   20 - 22 CJ 20 - 40% Minimal Assist   23 CI 1-20% SBA / CGA   24 CH 0% Independent/ Mod I     Patient left up in chair with all lines intact, call button in reach, RN notified and spouse present    ASSESSMENT:  Kaylin Neff is a 71 y.o. female with a medical diagnosis of Left-sided nontraumatic intracerebral hemorrhage and presents with R weakness, impaired cognition, impaired functional mobility and overall decrease in functional indep and safety with daily tasks and activities. She demo good support and good motivation at this time. She will cont to benefit from OT services at this time to address the following areas of concern:    Rehab identified problem list/impairments: Rehab identified problem list/impairments: weakness, impaired endurance,  impaired self care skills, impaired functional mobilty, gait instability, impaired balance, decreased upper extremity function, decreased lower extremity function, decreased coordination, impaired cognition, decreased safety awareness, abnormal tone, impaired fine motor, decreased ROM    Rehab potential is good.    Activity tolerance: Good    Discharge recommendations: Discharge Facility/Level Of Care Needs: rehabilitation facility     Barriers to discharge: Barriers to Discharge: Inaccessible home environment, Decreased caregiver support    Equipment recommendations: bedside commode, shower chair     GOALS:   Occupational Therapy Goals        Problem: Occupational Therapy Goal    Goal Priority Disciplines Outcome Interventions   Occupational Therapy Goal     OT, PT/OT Ongoing (interventions implemented as appropriate)    Description:  Goals to be met by: 3/22/2017; Revisions made 3/14    Patient will increase functional independence with ADLs by performing:    Feeding with Set-up Assistance and min(a) with AD as needed.  UE Dressing while seated with Stand-by Assistance.  LE Dressing with moderate assistance.  Grooming while standing with Minimal Assistance.  Toileting from bedside commode with Minimal Assistance for hygiene and clothing management.   Bathing from edge of bed with Moderate Assistance.  Sitting at edge of bed x20 minutes with Stand-by Assistance.  Supine to sit with contact guard Assistance.  Stand pivot transfers with Contact Guard Assistance. Met 3/14  -Revised: with supervision  Toilet transfer to bedside commode with SBA.                 Plan:  Patient to be seen 6 x/week to address the above listed problems via therapeutic activities, self-care/home management, therapeutic exercises, neuromuscular re-education, cognitive retraining  Plan of Care expires: 04/10/17  Plan of Care reviewed with: patient, spouse    BESSIE Gonzalez  03/14/2017

## 2017-03-15 LAB
ALBUMIN SERPL BCP-MCNC: 3.6 G/DL
ALP SERPL-CCNC: 78 U/L
ALT SERPL W/O P-5'-P-CCNC: 19 U/L
ANION GAP SERPL CALC-SCNC: 12 MMOL/L
AST SERPL-CCNC: 22 U/L
BASOPHILS # BLD AUTO: 0.02 K/UL
BASOPHILS NFR BLD: 0.2 %
BILIRUB SERPL-MCNC: 1.2 MG/DL
BUN SERPL-MCNC: 14 MG/DL
CALCIUM SERPL-MCNC: 9.4 MG/DL
CHLORIDE SERPL-SCNC: 108 MMOL/L
CO2 SERPL-SCNC: 21 MMOL/L
CREAT SERPL-MCNC: 0.8 MG/DL
DIFFERENTIAL METHOD: ABNORMAL
EOSINOPHIL # BLD AUTO: 0.4 K/UL
EOSINOPHIL NFR BLD: 4.4 %
ERYTHROCYTE [DISTWIDTH] IN BLOOD BY AUTOMATED COUNT: 13.6 %
EST. GFR  (AFRICAN AMERICAN): >60 ML/MIN/1.73 M^2
EST. GFR  (NON AFRICAN AMERICAN): >60 ML/MIN/1.73 M^2
GLUCOSE SERPL-MCNC: 95 MG/DL
HCT VFR BLD AUTO: 41.7 %
HGB BLD-MCNC: 13.5 G/DL
LYMPHOCYTES # BLD AUTO: 4.7 K/UL
LYMPHOCYTES NFR BLD: 50.7 %
MAGNESIUM SERPL-MCNC: 1.8 MG/DL
MCH RBC QN AUTO: 29.5 PG
MCHC RBC AUTO-ENTMCNC: 32.4 %
MCV RBC AUTO: 91 FL
MONOCYTES # BLD AUTO: 0.8 K/UL
MONOCYTES NFR BLD: 8.9 %
NEUTROPHILS # BLD AUTO: 3.3 K/UL
NEUTROPHILS NFR BLD: 35.7 %
PHOSPHATE SERPL-MCNC: 4.4 MG/DL
PLATELET # BLD AUTO: 193 K/UL
PMV BLD AUTO: 11.1 FL
POCT GLUCOSE: 100 MG/DL (ref 70–110)
POTASSIUM SERPL-SCNC: 4.1 MMOL/L
PROT SERPL-MCNC: 6.9 G/DL
RBC # BLD AUTO: 4.57 M/UL
SODIUM SERPL-SCNC: 139 MMOL/L
SODIUM SERPL-SCNC: 141 MMOL/L
WBC # BLD AUTO: 9.34 K/UL

## 2017-03-15 PROCEDURE — 36415 COLL VENOUS BLD VENIPUNCTURE: CPT

## 2017-03-15 PROCEDURE — 85025 COMPLETE CBC W/AUTO DIFF WBC: CPT

## 2017-03-15 PROCEDURE — 97535 SELF CARE MNGMENT TRAINING: CPT

## 2017-03-15 PROCEDURE — 20600001 HC STEP DOWN PRIVATE ROOM

## 2017-03-15 PROCEDURE — 84100 ASSAY OF PHOSPHORUS: CPT

## 2017-03-15 PROCEDURE — 97116 GAIT TRAINING THERAPY: CPT

## 2017-03-15 PROCEDURE — 25000003 PHARM REV CODE 250: Performed by: ANESTHESIOLOGY

## 2017-03-15 PROCEDURE — 80053 COMPREHEN METABOLIC PANEL: CPT

## 2017-03-15 PROCEDURE — 83735 ASSAY OF MAGNESIUM: CPT

## 2017-03-15 PROCEDURE — 25000003 PHARM REV CODE 250: Performed by: PSYCHIATRY & NEUROLOGY

## 2017-03-15 PROCEDURE — 92507 TX SP LANG VOICE COMM INDIV: CPT

## 2017-03-15 PROCEDURE — 84295 ASSAY OF SERUM SODIUM: CPT

## 2017-03-15 PROCEDURE — 99233 SBSQ HOSP IP/OBS HIGH 50: CPT | Mod: GC,,, | Performed by: PSYCHIATRY & NEUROLOGY

## 2017-03-15 PROCEDURE — 25000003 PHARM REV CODE 250: Performed by: PHYSICIAN ASSISTANT

## 2017-03-15 RX ADMIN — HEPARIN SODIUM 5000 UNITS: 5000 INJECTION, SOLUTION INTRAVENOUS; SUBCUTANEOUS at 01:03

## 2017-03-15 RX ADMIN — METOPROLOL TARTRATE 25 MG: 25 TABLET, FILM COATED ORAL at 09:03

## 2017-03-15 RX ADMIN — HEPARIN SODIUM 5000 UNITS: 5000 INJECTION, SOLUTION INTRAVENOUS; SUBCUTANEOUS at 05:03

## 2017-03-15 RX ADMIN — Medication 3 ML: at 05:03

## 2017-03-15 RX ADMIN — Medication 3 ML: at 09:03

## 2017-03-15 RX ADMIN — ATORVASTATIN CALCIUM 40 MG: 20 TABLET, FILM COATED ORAL at 09:03

## 2017-03-15 RX ADMIN — LISINOPRIL 20 MG: 20 TABLET ORAL at 09:03

## 2017-03-15 RX ADMIN — METOPROLOL TARTRATE 25 MG: 25 TABLET, FILM COATED ORAL at 01:03

## 2017-03-15 RX ADMIN — Medication 3 ML: at 01:03

## 2017-03-15 RX ADMIN — HEPARIN SODIUM 5000 UNITS: 5000 INJECTION, SOLUTION INTRAVENOUS; SUBCUTANEOUS at 09:03

## 2017-03-15 NOTE — PLAN OF CARE
Problem: Physical Therapy Goal  Goal: Physical Therapy Goal  Goals to be met by: 3/20/2017     Patient will increase functional independence with mobility by performin. Supine to sit with Stand-by Assistance  2. Sit to supine with Stand-by Assistance  3. Sit to stand transfer with Stand-by Assistance  4. Bed to chair transfer with Contact Guard Assistance using AD or No AD  5. Gait x50 feet with Stand-by Assistance using AD or No AD  6. Sitting at edge of bed x10 minutes with Supervision  7. Stand for x10 minutes with Stand-by Assistance using Ad or NO AD  8. Lower extremity exercise program x15 reps per handout, with supervision   Outcome: Ongoing (interventions implemented as appropriate)  Pt would benefit from Rehab upon discharge. Pt progressing well towards goals.  Guerita Loving PT, DPT  3/15/2017  [phone]

## 2017-03-15 NOTE — PLAN OF CARE
SW received call from  with [facility] reporting they do not take [employer].     Maki Ahumada LMSW  Neurocritical Care   [facility]  [phone]

## 2017-03-15 NOTE — PLAN OF CARE
[facility] [phone] Dee received referral    [facility] received referral [phone]

## 2017-03-15 NOTE — PT/OT/SLP PROGRESS
"Physical Therapy  Treatment    Kaylin Neff   MRN: [id number]   Admitting Diagnosis: Left-sided nontraumatic intracerebral hemorrhage    PT Received On: 03/15/17  PT Start Time: 1024     PT Stop Time: 1105    PT Total Time (min): 41 min       Billable Minutes:  Gait [id number]    Treatment Type: Treatment  PT/PTA: PT       General Precautions: Standard, aspiration, fall, seizure  Orthopedic Precautions: N/A   Braces: N/A    Subjective:  Communicated with JUAN PABLO Thomas prior to session.  "It's so strange that I have to think about each movement now."  Pt's  present throughout session.    Pain Ratin/10  Pain Rating Post-Intervention: 0/10    Objective:   Patient found with: telemetry    Functional Mobility:  Bed Mobility:    Pt found/returned to bedside chair.    Transfers:    Sit <> Stand: x 3 trials from bedside chair with Contact Guard Assistance with No Assistive Device    Stand <> Sit: x 3 trials to bedside chair with Contact Guard Assistance with No Assistive Device   Comments: vc's to use RUE to push-up.    Gait:   Distance Ambulated: 65ft x 1 trial with R HHA, 65ft with RW; with seated rest break.    Assistance level: Contact Guard Assistance and Minimal Assistance   Assistive Device used:  No Assistive Device and Rolling Walker   Gait Pattern: swing-through gait   Gait Deviation(s): decreased dileep, decreased step length and decreased stride length   Impairments due to: decreased balance, decreased attention to right   Comments: Pt able to have proper step length with RUE HHA, with CGA (constant vc's for attention to right side). With RW, PT provided constant tactile cues to right hand for attention to right side and maintaining grasp on RW. Pt with veering to left and not managing RW well. Attempted gait with RW to increase independence, pt however with limited mobility due to poor attention.    Therapeutic Activities and Exercises:  Standing at EOB with R hand on bed in weight bearing: " reciprocal marching and heel raises. Pt required min A for marching due to impaired balance, pt with narrow AYLEEN.   Provided education to pt and  regarding continued need for attention to right side of body and room. Verbalized understanding.     AM-PAC 6 CLICK MOBILITY  How much help from another person does this patient currently need?   1 = Unable, Total/Dependent Assistance  2 = A lot, Maximum/Moderate Assistance  3 = A little, Minimum/Contact Guard/Supervision  4 = None, Modified [place]/Independent    Turning over in bed (including adjusting bedclothes, sheets and blankets)?: 3  Sitting down on and standing up from a chair with arms (e.g., wheelchair, bedside commode, etc.): 3  Moving from lying on back to sitting on the side of the bed?: 3  Moving to and from a bed to a chair (including a wheelchair)?: 3  Need to walk in hospital room?: 3  Climbing 3-5 steps with a railing?: 3  Total Score: 18    AM-PAC Raw Score CMS G-Code Modifier Level of Impairment Assistance   6 % Total / Unable   7 - 9 CM 80 - 100% Maximal Assist   10 - 14 CL 60 - 80% Moderate Assist   15 - 19 CK 40 - 60% Moderate Assist   20 - 22 CJ 20 - 40% Minimal Assist   23 CI 1-20% SBA / CGA   24 CH 0% Independent/ Mod I     Patient left up in chair with all lines intact, call button in reach and RN notified.    Assessment:  Kaylin Neff is a 71 y.o. female with a medical diagnosis of Left-sided nontraumatic intracerebral hemorrhage. Patient is making progress towards goals, participating well in this session. Pt continues to require significant assist with attention to the right. Pt with noted improvement with gait training, standing balance. Pt with excellent motivation to participate in rehab. Ms. Neff's deficits affect their ability to safely and independently participate in self-care tasks and functional mobility. Due to her physical therapy diagnosis of debility and deconditioning, they continue to benefit from acute  PT services to address the following limitations: high fall risk, weakness, instability, the need for supervised instructions of exercise, and the decreased ability to perform functional activities which they were able to complete PTA. Education was provided to patient & family regarding importance of continued participation in therapy, patient's progress and discharge disposition. Pt would benefit from Rehab upon discharge to improve quality of life and focus on recovery of impairments.     Rehab identified problem list/impairments: Rehab identified problem list/impairments: impaired self care skills, impaired functional mobilty, weakness, gait instability, impaired balance, visual deficits, decreased lower extremity function, decreased upper extremity function    Rehab potential is good.    Activity tolerance: Good    Discharge recommendations: Discharge Facility/Level Of Care Needs: rehabilitation facility     Barriers to discharge: Barriers to Discharge: Inaccessible home environment, Decreased caregiver support    Equipment recommendations: Equipment Needed After Discharge: bedside commode, shower chair     GOALS:   Physical Therapy Goals        Problem: Physical Therapy Goal    Goal Priority Disciplines Outcome Goal Variances Interventions   Physical Therapy Goal     PT/OT, PT Ongoing (interventions implemented as appropriate)     Description:  Goals to be met by: 3/20/2017     Patient will increase functional independence with mobility by performin. Supine to sit with Stand-by Assistance  2. Sit to supine with Stand-by Assistance  3. Sit to stand transfer with Stand-by Assistance  4. Bed to chair transfer with Contact Guard Assistance using AD or No AD  5. Gait x50 feet with Stand-by Assistance using AD or No AD  6. Sitting at edge of bed x10 minutes with Supervision  7. Stand for x10 minutes with Stand-by Assistance using Ad or NO AD  8. Lower extremity exercise program x15 reps per handout, with  supervision                PLAN:    Patient to be seen 6 x/week  to address the above listed problems via gait training, therapeutic activities, therapeutic exercises, neuromuscular re-education  Plan of Care expires: 04/11/17  Plan of Care reviewed with: patient, spouse     Guerita CALL Inder PT, DPT  3/15/2017  [phone]

## 2017-03-15 NOTE — PLAN OF CARE
Problem: Patient Care Overview  Goal: Plan of Care Review  Outcome: Ongoing (interventions implemented as appropriate)  POC reviewed with pt and spouse; both verbalized acceptance and understanding.  Pt's VS stable; AAOx4.  Pt has some RUE weakness but has some effort and is working on moving it more.  Denies pain.  Sleeping throughout the night.  Able to ambulate to bathroom with 1 assist.  Call light in reach, side rails up x2, nonskid socks on, bed alarm set, bed in lowest position with wheels locked,  at bedside, telemetry monitoring ongoing.  Remains free from falls, skin breakdown, and injury.  Will continue to monitor.

## 2017-03-15 NOTE — PLAN OF CARE
Ssc sent referral to :    [facility] rehab ( p [phone]) ( f [phone])       [facility] for rehab) ( p [phone]) ( f [phone])          Christa/danette

## 2017-03-15 NOTE — PLAN OF CARE
Problem: Occupational Therapy Goal  Goal: Occupational Therapy Goal  Goals to be met by: 3/22/2017; Revisions made 3/14    Patient will increase functional independence with ADLs by performing:    Feeding with Set-up Assistance and min(a) with AD as needed.  UE Dressing while seated with Stand-by Assistance.  LE Dressing with moderate assistance.  Grooming while standing with Minimal Assistance.  Toileting from bedside commode with Minimal Assistance for hygiene and clothing management.   Bathing from edge of bed with Moderate Assistance.  Sitting at edge of bed x20 minutes with Stand-by Assistance.  Supine to sit with contact guard Assistance.  Stand pivot transfers with Contact Guard Assistance. Met 3/14  -Revised: with supervision  Toilet transfer to bedside commode with SBA.   Outcome: Ongoing (interventions implemented as appropriate)  Goals remain appropriate, continue with OT POC.

## 2017-03-15 NOTE — PLAN OF CARE
Pt has been accepted to [facility] for admission once insurance has approved.  Facility has requested insurance authorization, expects decision on tomorrow.  Of note, facility has cut off time of 11am admission.   If auth received after cutoff time, pt will be admitted following day.    Elicia Eli RN  Case Management  [phone]

## 2017-03-15 NOTE — PT/OT/SLP PROGRESS
Speech Language Pathology  Treatment    Kaylin Neff   MRN: [id number]   Admitting Diagnosis: Left-sided nontraumatic intracerebral hemorrhage    Diet recommendations: Solid Diet Level: Regular  Liquid Diet Level: Thin Feed only when awake/alert, HOB to 90 degrees, Small bites/sips, 1 bite/sip at a time, Meds whole 1 at a time, Eliminate distractions and Avoid talking while eating    SLP Treatment Date: 03/15/17  Speech Start Time: 1117     Speech Stop Time: 1133     Speech Total (min): 16 min       TREATMENT BILLABLE MINUTES:  Speech Therapy Individual 16    Has the patient been evaluated by SLP for swallowing? : Yes  Keep patient NPO?: No   General Precautions: Standard, aspiration, fall, seizure          Subjective:  Pt awake; pleasant    Pain Ratin/10              Pain Rating Post-Intervention: 0/10    Objective:     Pt completed reasoning task of deciphering analogies x7 with  86% acc indep; cues attempted though not beneficial. Pt completed simple deductive reasoning task using visual aid with 100% acc indep; benefited from requests for clarification. Pt read aloud and identified 2 sentences with 100% acc indep. Pt completed word finding task of identifying item described with 90% acc indep, cues attempted though not beneficial. No further questions.                                   Assessment:  Kaylin Neff is a 71 y.o. female with a medical diagnosis of Left-sided nontraumatic intracerebral hemorrhage and presents with aphasia, cognitive linguistic impairment, agraphia.continued progress towards goals.  .    Discharge recommendations: Discharge Facility/Level Of Care Needs: rehabilitation facility (pending PT OT recs)     Goals:   SLP Goals        Problem: SLP Goal    Goal Priority Disciplines Outcome   SLP Goal     SLP Ongoing (interventions implemented as appropriate)   Description:  Speech Language Pathology Goals  Goals expected to be met by 3/20/2017      1. Pt will tolerate regular diet with  thin liquids with no overt s/s of aspiration  2. Pt will participate in speech, language and cognitive evaluation to rule out cognitive linguistic deficits. GOAL MET  3. Pt will recall and utilize word finding strategies with 80% accuracy and min cues  4. Pt will complete reasoning tasks with 80% accuracy and min cues  5. Pt will participate in ongoing assessment of reading, writing, and visual spatial tasks                  Plan:   Patient to be seen Therapy Frequency: 5 x/week   Plan of Care expires: 04/10/17  Plan of Care reviewed with: patient, spouse, family  SLP Follow-up?: Yes  SLP - Next Visit Date: 03/16/17          Jeni Martinez M.A. CCC-SLP  Speech Language Pathologist  [phone]  3/15/2017

## 2017-03-15 NOTE — PT/OT/SLP PROGRESS
Occupational Therapy  Treatment    Kaylin Neff   MRN: [id number]   Admitting Diagnosis: Left-sided nontraumatic intracerebral hemorrhage    OT Date of Treatment: 03/15/17   OT Start Time: 164  OT Stop Time: 170  OT Total Time (min): 15 min    Billable Minutes:  Self Care/Home Management 15    General Precautions: Standard, fall, aspiration, seizure  Orthopedic Precautions: N/A  Braces: N/A    Do you have any cultural, spiritual, Anabaptist conflicts, given your current situation?: None    Subjective:  Communicated with RN prior to session.  Pt agreeable to therapy    Pain Ratin/10     Pain Rating Post-Intervention: 0/10    Objective:  Patient found with: telemetry     Functional Mobility:    Transfers:   Toilet Transfer Technique: Other (see comments) (ambulated)  Toilet Transfer Assistance: Contact Guard Assistance  Toilet Transfer Assistive Device: No Assistive Device    Functional Ambulation: CGA with hand held assist, postural sway noted    Activities of Daily Living:  Feeding Level of Assistance: Set-up Assistance, Moderate assistance (recommended plate guard, scoop dish and one handed rocker knife)   Provided handout of picture and pricing for both.   Toileting Where Assessed: Toilet  Toileting Level of Assistance: Moderate assistance      Balance:   Static Sit: GOOD-: Takes MODERATE challenges from all directions but inconsistently  Dynamic Sit: GOOD: Maintains balance through MODERATE excursions of active trunk movement  Static Stand: FAIR: Maintains without assist but unable to take challenges  Dynamic stand: FAIR: Needs CONTACT GUARD during gait    AM-PAC 6 CLICK ADL   How much help from another person does this patient currently need?   1 = Unable, Total/Dependent Assistance  2 = A lot, Maximum/Moderate Assistance  3 = A little, Minimum/Contact Guard/Supervision  4 = None, Modified [place]/Independent    Putting on and taking off regular lower body clothing? : 2  Bathing (including washing,  rinsing, drying)?: 2  Toileting, which includes using toilet, bedpan, or urinal? : 2  Putting on and taking off regular upper body clothing?: 2  Taking care of personal grooming such as brushing teeth?: 2  Eating meals?: 2  Total Score: 12     AM-PAC Raw Score CMS G-Code Modifier Level of Impairment Assistance   6 % Total / Unable   7 - 9 CM 80 - 100% Maximal Assist   10 - 14 CL 60 - 80% Moderate Assist   15 - 19 CK 40 - 60% Moderate Assist   20 - 22 CJ 20 - 40% Minimal Assist   23 CI 1-20% SBA / CGA   24 CH 0% Independent/ Mod I     Patient left up in chair with all lines intact    ASSESSMENT:  Kaylin Neff is a 71 y.o. female with a medical diagnosis of Left-sided nontraumatic intracerebral hemorrhage and presents with decline in ADLs and functional mobility. Pt would benefit from skilled OT services in order to maximize independence with ADLs and facilitate safe discharge.    Rehab identified problem list/impairments: Rehab identified problem list/impairments: weakness, impaired endurance, impaired sensation, impaired functional mobilty, impaired balance, decreased upper extremity function, decreased lower extremity function    Rehab potential is good.    Activity tolerance: Good    Discharge recommendations: Discharge Facility/Level Of Care Needs: rehabilitation facility     Barriers to discharge: Barriers to Discharge: Inaccessible home environment, Decreased caregiver support    Equipment recommendations: bedside commode, shower chair     GOALS:   Occupational Therapy Goals        Problem: Occupational Therapy Goal    Goal Priority Disciplines Outcome Interventions   Occupational Therapy Goal     OT, PT/OT Ongoing (interventions implemented as appropriate)    Description:  Goals to be met by: 3/22/2017; Revisions made 3/14    Patient will increase functional independence with ADLs by performing:    Feeding with Set-up Assistance and min(a) with AD as needed.  UE Dressing while seated with Stand-by  Assistance.  LE Dressing with moderate assistance.  Grooming while standing with Minimal Assistance.  Toileting from bedside commode with Minimal Assistance for hygiene and clothing management.   Bathing from edge of bed with Moderate Assistance.  Sitting at edge of bed x20 minutes with Stand-by Assistance.  Supine to sit with contact guard Assistance.  Stand pivot transfers with Contact Guard Assistance. Met 3/14  -Revised: with supervision  Toilet transfer to bedside commode with SBA.                 Plan:  Patient to be seen 6 x/week to address the above listed problems via self-care/home management, therapeutic activities, therapeutic exercises  Plan of Care expires: 04/10/17  Plan of Care reviewed with: patient, spouse         BESSIE Reardon  03/15/2017

## 2017-03-15 NOTE — PLAN OF CARE
Problem: SLP Goal  Goal: SLP Goal  Speech Language Pathology Goals  Goals expected to be met by 3/20/2017      1. Pt will tolerate regular diet with thin liquids with no overt s/s of aspiration  2. Pt will participate in speech, language and cognitive evaluation to rule out cognitive linguistic deficits. GOAL MET  3. Pt will recall and utilize word finding strategies with 80% accuracy and min cues  4. Pt will complete reasoning tasks with 80% accuracy and min cues  5. Pt will participate in ongoing assessment of reading, writing, and visual spatial tasks      Pt with continued progress towards goals.     Jeni Martinez M.A. CCC-SLP  Speech Language Pathologist  [phone]  3/15/2017

## 2017-03-16 VITALS
OXYGEN SATURATION: 95 % | TEMPERATURE: 99 F | HEIGHT: 67 IN | DIASTOLIC BLOOD PRESSURE: 65 MMHG | BODY MASS INDEX: 27.86 KG/M2 | WEIGHT: 177.5 LBS | HEART RATE: 70 BPM | SYSTOLIC BLOOD PRESSURE: 126 MMHG | RESPIRATION RATE: 18 BRPM

## 2017-03-16 LAB
ALBUMIN SERPL BCP-MCNC: 3.5 G/DL
ALP SERPL-CCNC: 78 U/L
ALT SERPL W/O P-5'-P-CCNC: 18 U/L
ANION GAP SERPL CALC-SCNC: 13 MMOL/L
AST SERPL-CCNC: 19 U/L
BASOPHILS # BLD AUTO: 0.02 K/UL
BASOPHILS NFR BLD: 0.2 %
BILIRUB SERPL-MCNC: 1 MG/DL
BUN SERPL-MCNC: 20 MG/DL
CALCIUM SERPL-MCNC: 9.6 MG/DL
CHLORIDE SERPL-SCNC: 104 MMOL/L
CO2 SERPL-SCNC: 23 MMOL/L
CREAT SERPL-MCNC: 0.8 MG/DL
DIFFERENTIAL METHOD: NORMAL
EOSINOPHIL # BLD AUTO: 0.4 K/UL
EOSINOPHIL NFR BLD: 4.4 %
ERYTHROCYTE [DISTWIDTH] IN BLOOD BY AUTOMATED COUNT: 13.5 %
EST. GFR  (AFRICAN AMERICAN): >60 ML/MIN/1.73 M^2
EST. GFR  (NON AFRICAN AMERICAN): >60 ML/MIN/1.73 M^2
GLUCOSE SERPL-MCNC: 105 MG/DL
HCT VFR BLD AUTO: 40.8 %
HGB BLD-MCNC: 13.3 G/DL
LYMPHOCYTES # BLD AUTO: 2.9 K/UL
LYMPHOCYTES NFR BLD: 33.3 %
MAGNESIUM SERPL-MCNC: 1.7 MG/DL
MCH RBC QN AUTO: 29.7 PG
MCHC RBC AUTO-ENTMCNC: 32.6 %
MCV RBC AUTO: 91 FL
MONOCYTES # BLD AUTO: 0.7 K/UL
MONOCYTES NFR BLD: 8 %
NEUTROPHILS # BLD AUTO: 4.6 K/UL
NEUTROPHILS NFR BLD: 53.9 %
PHOSPHATE SERPL-MCNC: 4.7 MG/DL
PLATELET # BLD AUTO: 195 K/UL
PMV BLD AUTO: 11 FL
POTASSIUM SERPL-SCNC: 4 MMOL/L
PROT SERPL-MCNC: 6.5 G/DL
RBC # BLD AUTO: 4.48 M/UL
SODIUM SERPL-SCNC: 140 MMOL/L
WBC # BLD AUTO: 8.59 K/UL

## 2017-03-16 PROCEDURE — 25000003 PHARM REV CODE 250: Performed by: ANESTHESIOLOGY

## 2017-03-16 PROCEDURE — 84100 ASSAY OF PHOSPHORUS: CPT

## 2017-03-16 PROCEDURE — 99239 HOSP IP/OBS DSCHRG MGMT >30: CPT | Mod: GC,,, | Performed by: PSYCHIATRY & NEUROLOGY

## 2017-03-16 PROCEDURE — 97803 MED NUTRITION INDIV SUBSEQ: CPT

## 2017-03-16 PROCEDURE — 80053 COMPREHEN METABOLIC PANEL: CPT

## 2017-03-16 PROCEDURE — 97110 THERAPEUTIC EXERCISES: CPT

## 2017-03-16 PROCEDURE — 25000003 PHARM REV CODE 250: Performed by: PHYSICIAN ASSISTANT

## 2017-03-16 PROCEDURE — 85025 COMPLETE CBC W/AUTO DIFF WBC: CPT

## 2017-03-16 PROCEDURE — 92507 TX SP LANG VOICE COMM INDIV: CPT

## 2017-03-16 PROCEDURE — 36415 COLL VENOUS BLD VENIPUNCTURE: CPT

## 2017-03-16 PROCEDURE — 97535 SELF CARE MNGMENT TRAINING: CPT

## 2017-03-16 PROCEDURE — 25000003 PHARM REV CODE 250: Performed by: PSYCHIATRY & NEUROLOGY

## 2017-03-16 PROCEDURE — 83735 ASSAY OF MAGNESIUM: CPT

## 2017-03-16 RX ORDER — ATORVASTATIN CALCIUM 40 MG/1
40 TABLET, FILM COATED ORAL DAILY
Qty: 90 TABLET | Refills: 3 | Status: SHIPPED | OUTPATIENT
Start: 2017-03-16 | End: 2017-03-16

## 2017-03-16 RX ORDER — ATORVASTATIN CALCIUM 40 MG/1
40 TABLET, FILM COATED ORAL DAILY
Qty: 90 TABLET | Refills: 3 | Status: SHIPPED | OUTPATIENT
Start: 2017-03-16 | End: 2017-03-27

## 2017-03-16 RX ORDER — LISINOPRIL 20 MG/1
20 TABLET ORAL DAILY
Qty: 90 TABLET | Refills: 3 | Status: SHIPPED | OUTPATIENT
Start: 2017-03-16 | End: 2018-03-30 | Stop reason: SDUPTHER

## 2017-03-16 RX ORDER — LISINOPRIL 20 MG/1
20 TABLET ORAL DAILY
Qty: 90 TABLET | Refills: 3 | Status: SHIPPED | OUTPATIENT
Start: 2017-03-16 | End: 2017-03-16

## 2017-03-16 RX ORDER — METOPROLOL TARTRATE 25 MG/1
25 TABLET, FILM COATED ORAL 3 TIMES DAILY
Qty: 84 TABLET | Refills: 0 | Status: SHIPPED | OUTPATIENT
Start: 2017-03-16 | End: 2021-08-18

## 2017-03-16 RX ADMIN — HEPARIN SODIUM 5000 UNITS: 5000 INJECTION, SOLUTION INTRAVENOUS; SUBCUTANEOUS at 02:03

## 2017-03-16 RX ADMIN — LISINOPRIL 20 MG: 20 TABLET ORAL at 09:03

## 2017-03-16 RX ADMIN — METOPROLOL TARTRATE 25 MG: 25 TABLET, FILM COATED ORAL at 02:03

## 2017-03-16 RX ADMIN — HEPARIN SODIUM 5000 UNITS: 5000 INJECTION, SOLUTION INTRAVENOUS; SUBCUTANEOUS at 05:03

## 2017-03-16 RX ADMIN — Medication 3 ML: at 02:03

## 2017-03-16 RX ADMIN — Medication 3 ML: at 05:03

## 2017-03-16 RX ADMIN — METOPROLOL TARTRATE 25 MG: 25 TABLET, FILM COATED ORAL at 06:03

## 2017-03-16 RX ADMIN — ATORVASTATIN CALCIUM 40 MG: 20 TABLET, FILM COATED ORAL at 09:03

## 2017-03-16 NOTE — PROGRESS NOTES
[facility]  Vascular Neurology  Comprehensive Stroke Center  Progress Note      Neurologic Chief Complaint: L frontal ICH    Subjective:     Interval History: Patient is seen for follow-up neurological assessment and treatment recommendations: YSOELYN. Neurologically and medically stable. Awaiting resolution of insurance approval prior to discharge to inpatient rehab.    HPI, Past Medical, Family, and Social History remains the same as documented in the initial encounter.     Review of Systems   Constitutional: Negative for chills and fever.   HENT: Negative for drooling.    Eyes: Negative for redness.   Respiratory: Negative for cough and shortness of breath.    Cardiovascular: Negative for chest pain and palpitations.   Gastrointestinal: Negative for vomiting.   Musculoskeletal: Negative for joint swelling.   Skin: Negative for rash.   Neurological: Positive for facial asymmetry, speech difficulty and weakness. Negative for dizziness, numbness and headaches.   Psychiatric/Behavioral: Negative for agitation and behavioral problems.     Scheduled Meds:   atorvastatin  40 mg Oral Daily    heparin (porcine)  5,000 Units Subcutaneous Q8H    lisinopril  20 mg Oral Daily    metoprolol tartrate  25 mg Oral TID    sodium chloride 0.9%  3 mL Intravenous Q8H     Continuous Infusions:     PRN Meds:hydrALAZINE, labetalol, ondansetron    Objective:     Vital Signs (Most Recent):  Temp: 97.5 °F (36.4 °C) (03/16/17 0339)  Pulse: (!) 55 (03/16/17 0339)  Resp: 16 (03/16/17 0339)  BP: (!) 148/67 (03/16/17 0339)  SpO2: (!) 94 % (03/16/17 0339)  BP Location: Left arm    Vital Signs Range (Last 24H):  Temp:  [97.5 °F (36.4 °C)-98.7 °F (37.1 °C)]   Pulse:  [50-84]   Resp:  [16]   BP: (123-148)/(57-73)   SpO2:  [94 %-98 %]   BP Location: Left arm    Physical Exam   Constitutional: She is oriented to person, place, and time. She appears well-developed and well-nourished. No distress.   HENT:   Head: Normocephalic  and atraumatic.   Eyes: EOM are normal. Pupils are equal, round, and reactive to light.   Neck: Normal range of motion.   Cardiovascular: Normal rate.    Pulmonary/Chest: Effort normal. No respiratory distress.   Abdominal: Soft. She exhibits no distension.   Musculoskeletal: Normal range of motion.   Neurological: She is alert and oriented to person, place, and time. GCS eye subscore is 4 - spontaneous. GCS verbal subscore is 5 - oriented. GCS motor subscore is 6 - obeys commands.   Skin: Skin is warm and dry.   Psychiatric: She has a normal mood and affect. Her behavior is normal.   Vitals reviewed.      Neurological Exam:   LOC: alert and follows requests  Language: Expressive aphasia  Speech: Dysarthria  Orientation: Person, Place, Time  Memory: Recent memory intact, Remote memory intact, Age correct, Month correct  Visual Fields (CN II): Full  EOM (CN III, IV, VI): Full/intact  Oculocephalics: normal  Pupils (CN III, IV, VI): PERRL  Facial Sensation (CN V): Symmetric  Facial Movement (CN VII): lower weakness right lower  Hearing (CN VIII): intact bilaterally  Motor*: Arm Left:  Normal (5/5), Leg Left:   Normal (5/5), Arm Right:   Paretic:  4/5, Leg Right:   Paretic:  4/5  Cerebellar*: L sided ataxia  Sensation: intact to light touch  Tone: Arm-Left: normal; Leg-Left: normal; Arm-Right: normal; Leg-Right: normal    NIH Stroke Scale:    Level of Consciousness: 0 - alert  LOC Questions: 0 - answers both correctly  LOC Commands: 0 - performs both correctly  Best Gaze: 0 - normal  Visual: 0 - no visual loss (inconsistent visual exam.  Patient has more difficulty with lower quadrants than upper quadrants)  Facial Palsy: 0 - normal  Motor Left Arm: 0 - no drift  Motor Right Arm: 0 - no drift  Motor Left Le - drift  Motor Right Le - drift  Limb Ataxia: 1 - present in one limb  Sensory: 0 - normal  Best Language: 1 - mild to moderate aphasia  Dysarthria: 1 - mild to moderate dysarthria  Extinction and  Inattention: 0 - no neglect  NIH Stroke Scale Total: 5  [place] Coma Scale:  Best Eye Response: 4 - spontaneous  Best Motor Response: 6 - obeys commands  Best Verbal Response: 5 - oriented        Laboratory:  CMP:     Recent Labs  Lab 03/15/17  0548   CALCIUM 9.4   ALBUMIN 3.6   PROT 6.9      K 4.1   CO2 21*      BUN 14   CREATININE 0.8   ALKPHOS 78   ALT 19   AST 22   BILITOT 1.2*     CBC:     Recent Labs  Lab 03/15/17  0548   WBC 9.34   RBC 4.57   HGB 13.5   HCT 41.7      MCV 91   MCH 29.5   MCHC 32.4     Lipid Panel:     Recent Labs  Lab 03/11/17  1625   CHOL 254*   LDLCALC 177.6*   HDL 54   TRIG 112     Coagulation:     Recent Labs  Lab 03/11/17  1625   INR 1.0   APTT 23.1     Platelet Aggregation Study: No results for input(s): PLTAGG, PLTAGINTERP, PLTAGREGLACO, ADPPLTAGGREG in the last 168 hours.  Hgb A1C:     Recent Labs  Lab 03/11/17  1625   HGBA1C 6.1     TSH:     Recent Labs  Lab 03/11/17  1625   TSH 3.063       Diagnostic Results:  I have personally reviewed:   Findings:     CT Head OSH. Date: 03/11/17  -L frontal ICH    MRI Head. Date: 03/11/17  -3.6 x 3.1 cm T1 iso-to hyperintense and T2 hyperintense region in the left parietal lobe with associated blood fluid levels, compatible with subacute parenchymal hemorrhage.  -Diffusion restriction within this region is nonspecific and may either represent hemorrhagic infarction or the anisotropy of subacute blood components.  -No additional areas of parenchymal hemorrhage identified on the GRE sequences.      MRA Head/Neck. Date: 03/11/17  -No evidence of vascular abnormality to explain the parenchymal hemorrhage in the left parietal lobe.  -No evidence of aneurysm or stenosis involving the intracranial vessels    Assessment/Plan:     Kaylin Neff is a 71 y.o. Female with L frontal ICH transferred from OSH for further care. Patient presents with RSW and speech difficulties with an NIH of 5. She was admitted to neuro ICU for further care.  CT head confirmed L frontal ICH. MRA was negative for vascular abnormalities.     3/12/17 Patient has no complaints, has drift on right (arm>leg) with aphasia and R facial droop; remains on cardene    3/13 off cardene    3/15 BP remains well controlled on oral regimen. Ready for discharge.       * Left-sided nontraumatic intracerebral hemorrhage  Kaylin Neff is a 71 y.o. female with PMHx of HTN with L frontal ICH. DDx includes mass/tumor, bleeding infarct, hypertensive bleed  and amyloid angiopathy. Patient was seen by neurosurgeryand will f/u as outpatient with repeat imaging in 2 weeks.    Antithrombotics for secondary stroke prevention: None: Reason:  Hemorrhagic Stroke    Statins for secondary stroke prevention and hyperlipidemia, if present: Not required, given ICH.  However,  - continue Atorvastatin- 40 mg oral daily    Aggressive risk factor modification: Hypertension and Smoking, Rehab Efforts: Physical Therapy, Occupational Therapy and Speech and Language Pathology    Diagnostics: Ordered/Pending    VTE Prophylaxis: SCDs    BP Parameters: SBP <140    Nursing Orders: Neuro checks- Q1H, Swallowing evaluation by Nursing, Seizure precautions, Out of bed BID, Avoid Landaverde catheter, Pneumatic compression device, Stroke Education, Blood glucose target 100-130, Up with assistance     IV Fluids: adequate PO intake    Hemiparesis, right  2/2 stroke  PT/OT    Essential hypertension  Stroke risk factor  SBP <140  Lisinopril 20mg qd  Lopressor 25mg TID    Vasogenic cerebral edema  Evident on imaging  2/2 ICH    Cigarette smoker  Stroke risk factor   on cessation    Hyperlipidemia  Stroke risk factor  .6  Atorvastatin 40      Ivan Ramirez MD  Comprehensive Stroke Center  Department of Vascular Neurology   [facility]

## 2017-03-16 NOTE — PT/OT/SLP PROGRESS
Physical Therapy  Treatment / Discharge Summary    Kaylin Neff   MRN: [id number]   Admitting Diagnosis: Left-sided nontraumatic intracerebral hemorrhage    PT Received On: 17  PT Start Time: 1357     PT Stop Time: 1420    PT Total Time (min): 23 min       Billable Minutes:  Gait [id number] and Therapeutic Exercise 8    Treatment Type: Treatment  PT/PTA: PT       General Precautions: Standard, fall  Orthopedic Precautions: N/A   Braces: N/A    Subjective:  Communicated with JUAN PABLO Mccann prior to session.  Pt agrees to participate in session.  Pt's family present throughout session.    Pain Ratin/10  Pain Rating Post-Intervention: 0/10    Objective:   Patient found with: telemetry    Functional Mobility:  Bed Mobility:    Pt found/returned to bedside chair    Transfers:    Sit <> Stand: x 1 trial from bedside chair, x1 from EOB with Standby Assistance with No Assistive Device    Stand <> Sit: x 1 trial to EOB, x 1 trial to bedside chair with Standby Assistance with No Assistive Device   Comments: Pt requires vc's to attend to right hand.     Gait:   Distance Ambulated: 150ft    Assistance level: Contact Guard Assistance   Assistive Device used:  no AD   Gait Pattern: swing-through gait   Gait Deviation(s): decreased dileep and decreased step length   Impairments due to: decreased strength, decreased attention to right side.   Comments: Pt provided WB surface and facilitation of RUE into extension and promote arm swing. Pt initially with RUE with flexor synergy drawn across her chest. Pt with mild scissoring and narrow AYLEEN.     Therapeutic Activities and Exercises:  Standing at EOB: BLE marching, heel raises, mini-squats, hip ABD/ADD, hamstring curls x 10reps. Pt required vc's for appropriate AYLEEN and reciprocal pattern to increase coordination.     AM-PAC 6 CLICK MOBILITY  How much help from another person does this patient currently need?   1 = Unable, Total/Dependent Assistance  2 = A lot, Maximum/Moderate  Assistance  3 = A little, Minimum/Contact Guard/Supervision  4 = None, Modified [place]/Independent    Turning over in bed (including adjusting bedclothes, sheets and blankets)?: 3  Sitting down on and standing up from a chair with arms (e.g., wheelchair, bedside commode, etc.): 3  Moving from lying on back to sitting on the side of the bed?: 3  Moving to and from a bed to a chair (including a wheelchair)?: 3  Need to walk in hospital room?: 3  Climbing 3-5 steps with a railing?: 3  Total Score: 18    AM-PAC Raw Score CMS G-Code Modifier Level of Impairment Assistance   6 % Total / Unable   7 - 9 CM 80 - 100% Maximal Assist   10 - 14 CL 60 - 80% Moderate Assist   15 - 19 CK 40 - 60% Moderate Assist   20 - 22 CJ 20 - 40% Minimal Assist   23 CI 1-20% SBA / CGA   24 CH 0% Independent/ Mod I     Patient left up in chair with all lines intact, call button in reach, RN notified and family present.    Assessment:  Kaylin Neff is a 71 y.o. female with a medical diagnosis of Left-sided nontraumatic intracerebral hemorrhage. Patient is making progress towards goals, participating well in this session. Pt continues to require significant assist with attention to the right. Pt with noted improvement with gait training and proper step length. Ms. Neff's deficits affect their ability to safely and independently participate in self-care tasks and functional mobility. Due to her physical therapy diagnosis of debility and deconditioning, they continue to benefit from acute PT services to address the following limitations: high fall risk, weakness, instability, the need for supervised instructions of exercise, and the decreased ability to perform functional activities which they were able to complete PTA. Education was provided to patient & family regarding importance of continued participation in therapy, patient's progress and discharge disposition. Pt would benefit from Rehab upon discharge to improve quality of  life and focus on recovery of impairments.     Rehab identified problem list/impairments: Rehab identified problem list/impairments: weakness, gait instability, impaired balance, decreased upper extremity function, decreased lower extremity function, decreased safety awareness    Rehab potential is good.    Activity tolerance: Good    Discharge recommendations: Discharge Facility/Level Of Care Needs: rehabilitation facility (pt denied rehab by [employer], pt will receive OPPT/OT/SLP)     Barriers to discharge: Barriers to Discharge: Inaccessible home environment, Decreased caregiver support    Equipment recommendations: Equipment Needed After Discharge: bedside commode, shower chair     GOALS:   Physical Therapy Goals        Problem: Physical Therapy Goal    Goal Priority Disciplines Outcome Goal Variances Interventions   Physical Therapy Goal     PT/OT, PT Ongoing (interventions implemented as appropriate)     Description:  Goals to be met by: 3/20/2017     Patient will increase functional independence with mobility by performin. Supine to sit with Stand-by Assistance  2. Sit to supine with Stand-by Assistance  3. Sit to stand transfer with Stand-by Assistance  4. Bed to chair transfer with Contact Guard Assistance using AD or No AD  5. Gait x50 feet with Stand-by Assistance using AD or No AD  6. Sitting at edge of bed x10 minutes with Supervision  7. Stand for x10 minutes with Stand-by Assistance using Ad or NO AD  8. Lower extremity exercise program x15 reps per handout, with supervision                PLAN:    Patient to be seen 6 x/week  to address the above listed problems via gait training, therapeutic activities, therapeutic exercises, neuromuscular re-education  Plan of Care expires: 17  Plan of Care reviewed with: patient, spouse     Guerita R Inder PT, DPT  3/16/2017  [phone]

## 2017-03-16 NOTE — PLAN OF CARE
Problem: Patient Care Overview  Goal: Plan of Care Review  Outcome: Ongoing (interventions implemented as appropriate)  Patient has remained free of falls this shift. She is AAOx4 and VS's have been stable. Patient and spouse expect her to be released to [facility] sometime tomorrow.

## 2017-03-16 NOTE — SUBJECTIVE & OBJECTIVE
Neurologic Chief Complaint: L frontal ICH    Subjective:     Interval History: Patient is seen for follow-up neurological assessment and treatment recommendations: YOSELYN. Neurologically and medically stable. Awaiting resolution of insurance approval prior to discharge to inpatient rehab.    HPI, Past Medical, Family, and Social History remains the same as documented in the initial encounter.     Review of Systems   Constitutional: Negative for chills and fever.   HENT: Negative for drooling.    Eyes: Negative for redness.   Respiratory: Negative for cough and shortness of breath.    Cardiovascular: Negative for chest pain and palpitations.   Gastrointestinal: Negative for vomiting.   Musculoskeletal: Negative for joint swelling.   Skin: Negative for rash.   Neurological: Positive for facial asymmetry, speech difficulty and weakness. Negative for dizziness, numbness and headaches.   Psychiatric/Behavioral: Negative for agitation and behavioral problems.     Scheduled Meds:   atorvastatin  40 mg Oral Daily    heparin (porcine)  5,000 Units Subcutaneous Q8H    lisinopril  20 mg Oral Daily    metoprolol tartrate  25 mg Oral TID    sodium chloride 0.9%  3 mL Intravenous Q8H     Continuous Infusions:     PRN Meds:hydrALAZINE, labetalol, ondansetron    Objective:     Vital Signs (Most Recent):  Temp: 97.5 °F (36.4 °C) (03/16/17 0339)  Pulse: (!) 55 (03/16/17 0339)  Resp: 16 (03/16/17 0339)  BP: (!) 148/67 (03/16/17 0339)  SpO2: (!) 94 % (03/16/17 0339)  BP Location: Left arm    Vital Signs Range (Last 24H):  Temp:  [97.5 °F (36.4 °C)-98.7 °F (37.1 °C)]   Pulse:  [50-84]   Resp:  [16]   BP: (123-148)/(57-73)   SpO2:  [94 %-98 %]   BP Location: Left arm    Physical Exam   Constitutional: She is oriented to person, place, and time. She appears well-developed and well-nourished. No distress.   HENT:   Head: Normocephalic and atraumatic.   Eyes: EOM are normal. Pupils are equal, round, and reactive to light.   Neck: Normal  range of motion.   Cardiovascular: Normal rate.    Pulmonary/Chest: Effort normal. No respiratory distress.   Abdominal: Soft. She exhibits no distension.   Musculoskeletal: Normal range of motion.   Neurological: She is alert and oriented to person, place, and time. GCS eye subscore is 4 - spontaneous. GCS verbal subscore is 5 - oriented. GCS motor subscore is 6 - obeys commands.   Skin: Skin is warm and dry.   Psychiatric: She has a normal mood and affect. Her behavior is normal.   Vitals reviewed.      Neurological Exam:   LOC: alert and follows requests  Language: Expressive aphasia  Speech: Dysarthria  Orientation: Person, Place, Time  Memory: Recent memory intact, Remote memory intact, Age correct, Month correct  Visual Fields (CN II): Full  EOM (CN III, IV, VI): Full/intact  Oculocephalics: normal  Pupils (CN III, IV, VI): PERRL  Facial Sensation (CN V): Symmetric  Facial Movement (CN VII): lower weakness right lower  Hearing (CN VIII): intact bilaterally  Motor*: Arm Left:  Normal (5/5), Leg Left:   Normal (5/5), Arm Right:   Paretic:  4/5, Leg Right:   Paretic:  4/5  Cerebellar*: L sided ataxia  Sensation: intact to light touch  Tone: Arm-Left: normal; Leg-Left: normal; Arm-Right: normal; Leg-Right: normal    NIH Stroke Scale:    Level of Consciousness: 0 - alert  LOC Questions: 0 - answers both correctly  LOC Commands: 0 - performs both correctly  Best Gaze: 0 - normal  Visual: 0 - no visual loss (inconsistent visual exam.  Patient has more difficulty with lower quadrants than upper quadrants)  Facial Palsy: 0 - normal  Motor Left Arm: 0 - no drift  Motor Right Arm: 0 - no drift  Motor Left Le - drift  Motor Right Le - drift  Limb Ataxia: 1 - present in one limb  Sensory: 0 - normal  Best Language: 1 - mild to moderate aphasia  Dysarthria: 1 - mild to moderate dysarthria  Extinction and Inattention: 0 - no neglect  NIH Stroke Scale Total: 5  Ruth Coma Scale:  Best Eye Response: 4 -  spontaneous  Best Motor Response: 6 - obeys commands  Best Verbal Response: 5 - oriented        Laboratory:  CMP:     Recent Labs  Lab 03/15/17  0548   CALCIUM 9.4   ALBUMIN 3.6   PROT 6.9      K 4.1   CO2 21*      BUN 14   CREATININE 0.8   ALKPHOS 78   ALT 19   AST 22   BILITOT 1.2*     CBC:     Recent Labs  Lab 03/15/17  0548   WBC 9.34   RBC 4.57   HGB 13.5   HCT 41.7      MCV 91   MCH 29.5   MCHC 32.4     Lipid Panel:     Recent Labs  Lab 03/11/17  1625   CHOL 254*   LDLCALC 177.6*   HDL 54   TRIG 112     Coagulation:     Recent Labs  Lab 03/11/17  1625   INR 1.0   APTT 23.1     Platelet Aggregation Study: No results for input(s): PLTAGG, PLTAGINTERP, PLTAGREGLACO, ADPPLTAGGREG in the last 168 hours.  Hgb A1C:     Recent Labs  Lab 03/11/17  1625   HGBA1C 6.1     TSH:     Recent Labs  Lab 03/11/17  1625   TSH 3.063       Diagnostic Results:  I have personally reviewed:   Findings:     CT Head OSH. Date: 03/11/17  -L frontal ICH    MRI Head. Date: 03/11/17  -3.6 x 3.1 cm T1 iso-to hyperintense and T2 hyperintense region in the left parietal lobe with associated blood fluid levels, compatible with subacute parenchymal hemorrhage.  -Diffusion restriction within this region is nonspecific and may either represent hemorrhagic infarction or the anisotropy of subacute blood components.  -No additional areas of parenchymal hemorrhage identified on the GRE sequences.      MRA Head/Neck. Date: 03/11/17  -No evidence of vascular abnormality to explain the parenchymal hemorrhage in the left parietal lobe.  -No evidence of aneurysm or stenosis involving the intracranial vessels

## 2017-03-16 NOTE — PLAN OF CARE
CM notified by Neha of denial of inpatient rehab.  Peer to peer has been completed and decision upheld to deny inpatient rehab.  Staff requested expedited appeal, completed by this CM to [phone], reference # [id number].  Neha has 72 hours for decision.  Updated therapy notes, to be faxed to [phone].        Outpatient therapy referral faxed to [facility] [phone] along with order, face sheet, PT/OT/ST notes.      Elicia Eli RN  Case Management  [id number]

## 2017-03-16 NOTE — ASSESSMENT & PLAN NOTE
Kaylin Neff is a 71 y.o. female with PMHx of HTN with L frontal ICH. DDx includes mass/tumor, bleeding infarct, hypertensive bleed  and amyloid angiopathy. Patient was seen by neurosurgeryand will f/u as outpatient with repeat imaging in 2 weeks.    Antithrombotics for secondary stroke prevention: None: Reason:  Hemorrhagic Stroke    Statins for secondary stroke prevention and hyperlipidemia, if present: Not required, given ICH.  However,  - continue Atorvastatin- 40 mg oral daily    Aggressive risk factor modification: Hypertension and Smoking, Rehab Efforts: Physical Therapy, Occupational Therapy and Speech and Language Pathology    Diagnostics: Ordered/Pending    VTE Prophylaxis: SCDs    BP Parameters: SBP <140    Nursing Orders: Neuro checks- Q1H, Swallowing evaluation by Nursing, Seizure precautions, Out of bed BID, Avoid Landaverde catheter, Pneumatic compression device, Stroke Education, Blood glucose target 100-130, Up with assistance     IV Fluids: adequate PO intake

## 2017-03-16 NOTE — PLAN OF CARE
Problem: Physical Therapy Goal  Goal: Physical Therapy Goal  Goals to be met by: 3/20/2017     Patient will increase functional independence with mobility by performin. Supine to sit with Stand-by Assistance  2. Sit to supine with Stand-by Assistance  3. Sit to stand transfer with Stand-by Assistance  4. Bed to chair transfer with Contact Guard Assistance using AD or No AD  5. Gait x50 feet with Stand-by Assistance using AD or No AD  6. Sitting at edge of bed x10 minutes with Supervision  7. Stand for x10 minutes with Stand-by Assistance using Ad or NO AD  8. Lower extremity exercise program x15 reps per handout, with supervision   Outcome: Ongoing (interventions implemented as appropriate)  Pt would benefit from Rehab upon discharge. Pt progressing well towards goals.  Guerita Loving PT, DPT  3/16/2017  [phone]

## 2017-03-16 NOTE — PROGRESS NOTES
Discharge instructions reviewed with pt and . Both verbalized understanding. Pt in possession of all belongings. IV and telemetry removed at discharge. Transported off unit in wheelchair. NAD.

## 2017-03-16 NOTE — PLAN OF CARE
03/16/2017      Kaylin CARRILLO Neff  [address] [place] [address]          Hospital Medicine Dept.  [facility] [address] [facility] [id number] [phone] after hours  [phone] fax Diagnosis:  Left-sided nontraumatic intracerebral hemorrhage                         Debility    PT eval and treat.  OT eval and treat.  ST eval and treat.  __________________________  Dr Zoraida Walton   03/16/2017

## 2017-03-16 NOTE — PLAN OF CARE
Problem: Patient Care Overview  Goal: Plan of Care Review  [facility]  Adult Nutrition  Care Plan Note      SUMMARY      Recommendations     Recommendation/Intervention: 1.  Continue PO Cardiac diet as Rx  2.  Monitor PO intake  3.  Monitor Labs   4.  RD to follow  Goals: 1.  Pt continue to tolerate  2.  Pt to consume >75% of meals  Nutrition Goal Status: progressing towards goal  Communication of RD Recs: other (comment) (pt's spouse)

## 2017-03-16 NOTE — PT/OT/SLP PROGRESS
Occupational Therapy  Treatment    Kaylin Neff   MRN: [id number]   Admitting Diagnosis: Left-sided nontraumatic intracerebral hemorrhage    OT Date of Treatment: 17   OT Start Time: 909  OT Stop Time: 947  OT Total Time (min): 38 min    Billable Minutes:  Self Care/Home Management 23 and Therapeutic Exercise 15    General Precautions: Standard, fall  Orthopedic Precautions: N/A  Braces: N/A    Do you have any cultural, spiritual, Restorationist conflicts, given your current situation?: None    Subjective:  Communicated with RN prior to session.  Pt agreeable to therapy    Pain Ratin/10     Pain Rating Post-Intervention: 0/10    Objective:  Patient found with: telemetry     Functional Mobility:  Bed Mobility:  Pt already sitting in chair upon OT arrival    Transfers:   Sit <> Stand Assistance: Supervision  Bed <> Chair Technique: Stand Pivot  Bed <> Chair Transfer Assistance: Stand By Assistance  Bed <> Chair Assistive Device: No Assistive Device    Functional Ambulation: CGA with mobility    Activities of Daily Living:  Feeding Level of Assistance: Set-up Assistance  Feeding adaptive equipment: recommended plate guard, scoop dish and rocker knife  UE Dressing Level of Assistance: Stand by assistance Pt snapped all the buttons on her shirt without assistance  UE adaptive equipment: none.  Provided pt and  with video demonstration of use of button hook just so they are aware of this item if they need in the future.   LE Dressing Level of Assistance: Stand by assistance (for donning pants, pt able to button and zip pants in standing position )  LE adaptive equipment: none   with tears of wife's good performance with ADLs this visit. Encouraged him to allow her up to a minute to struggle as he realized she can get it but just needs increased time.     Balance:   Static Sit: GOOD: Takes MODERATE challenges from all directions  Dynamic Sit: GOOD+: Maintains balance through MAXIMAL excursions of  active trunk motion  Static Stand: GOOD: Takes MODERATE challenges from all directions  Dynamic stand: GOOD+: Independent gait (with or without assistive device)    Therapeutic Activities and Exercises:  Pt performed bicep curls with 4# weighted dowel 3x10 reps  Pt performed pronation and supination with R hand using 2# dowel 3x10   Educated pt and family on importance of strengthening biceps to improve ability to supinate R hand to facilitate improved ability to use utensils during self feeding.       AM-PAC 6 CLICK ADL   How much help from another person does this patient currently need?   1 = Unable, Total/Dependent Assistance  2 = A lot, Maximum/Moderate Assistance  3 = A little, Minimum/Contact Guard/Supervision  4 = None, Modified Gallagher/Independent    Putting on and taking off regular lower body clothing? : 3  Bathing (including washing, rinsing, drying)?: 2  Toileting, which includes using toilet, bedpan, or urinal? : 3  Putting on and taking off regular upper body clothing?: 3  Taking care of personal grooming such as brushing teeth?: 3  Eating meals?: 3  Total Score: 17     AM-PAC Raw Score CMS G-Code Modifier Level of Impairment Assistance   6 % Total / Unable   7 - 9 CM 80 - 100% Maximal Assist   10 - 14 CL 60 - 80% Moderate Assist   15 - 19 CK 40 - 60% Moderate Assist   20 - 22 CJ 20 - 40% Minimal Assist   23 CI 1-20% SBA / CGA   24 CH 0% Independent/ Mod I     Patient left up in chair with all lines intact and call button in reach    ASSESSMENT:  Kaylin Neff is a 71 y.o. female with a medical diagnosis of Left-sided nontraumatic intracerebral hemorrhage and presents with decline in ADLs and functional mobility.   Pt would benefit from skilled OT services in order to maximize independence with ADLs and facilitate safe discharge.    Rehab identified problem list/impairments: Rehab identified problem list/impairments: weakness, impaired endurance, impaired sensation, impaired self care  skills, impaired functional mobilty, impaired balance, decreased coordination, decreased upper extremity function    Rehab potential is good.    Activity tolerance: Good    Discharge recommendations: Discharge Facility/Level Of Care Needs: rehabilitation facility     Barriers to discharge: Barriers to Discharge: Inaccessible home environment, Decreased caregiver support    Equipment recommendations: shower chair, bedside commode     GOALS:   Occupational Therapy Goals        Problem: Occupational Therapy Goal    Goal Priority Disciplines Outcome Interventions   Occupational Therapy Goal     OT, PT/OT Ongoing (interventions implemented as appropriate)    Description:  Goals to be met by: 3/22/2017; Revisions made 3/14    Patient will increase functional independence with ADLs by performing:    Feeding with Set-up Assistance and min(a) with AD as needed.  UE Dressing while seated with Stand-by Assistance.  LE Dressing with moderate assistance.  Grooming while standing with Minimal Assistance.  Toileting from bedside commode with Minimal Assistance for hygiene and clothing management.   Bathing from edge of bed with Moderate Assistance.  Sitting at edge of bed x20 minutes with Stand-by Assistance.  Supine to sit with contact guard Assistance.  Stand pivot transfers with Contact Guard Assistance. Met 3/14  -Revised: with supervision  Toilet transfer to bedside commode with SBA.                 Plan:  Patient to be seen 6 x/week to address the above listed problems via self-care/home management, therapeutic activities, therapeutic exercises, neuromuscular re-education  Plan of Care expires: 04/10/17  Plan of Care reviewed with: patient, spouse         BESSIE Reardon  03/16/2017

## 2017-03-16 NOTE — PT/OT/SLP PROGRESS
Speech Language Pathology  Treatment    Kaylin Neff   MRN: [id number]   Admitting Diagnosis: Left-sided nontraumatic intracerebral hemorrhage    Diet recommendations: Solid Diet Level: Regular  Liquid Diet Level: Thin Feed only when awake/alert, HOB to 90 degrees, Small bites/sips, 1 bite/sip at a time, Meds whole 1 at a time, Eliminate distractions and Avoid talking while eating    SLP Treatment Date: 17  Speech Start Time: 951     Speech Stop Time: 1009     Speech Total (min): 18 min       TREATMENT BILLABLE MINUTES:  Speech Therapy Individual 18    Has the patient been evaluated by SLP for swallowing? : Yes  Keep patient NPO?: No   General Precautions: Standard, aspiration, fall, seizure          Subjective:  Pt awake; pleasant; sitting in chair    Pain Ratin/10              Pain Rating Post-Intervention: 0/10    Objective:     Pt completed reasoning task of deciphering idioms/anolgies x5 with  60% acc indep, improving to 100% acc provided verbal cues.  Pt completed word finding/ concrete convergent categorization task with 90% acc indep, improving to 100% acc provided phonological cues. During all linguistic tasks, pt presented with mild hesitations and few paraphasias; benefited from min phonological cues.  Pt co decreased visual and short term recall skills s/p stroke. Skilled education provided on aphasia and linguistic rehab. Pt denied difficulty with PO. Spouse reported he was frustrated with being denied from rehab; emotional support provided. No further questions.                                   Assessment:  Kaylin Neff is a 71 y.o. female with a medical diagnosis of Left-sided nontraumatic intracerebral hemorrhage and presents with aphasia, cognitive linguistic impairment, agraphia. continued progress towards goals.  .    Discharge recommendations: Discharge Facility/Level Of Care Needs: rehabilitation facility (pending PT OT recs)     Goals:   SLP Goals        Problem: SLP Goal     Goal Priority Disciplines Outcome   SLP Goal     SLP Ongoing (interventions implemented as appropriate)   Description:  Speech Language Pathology Goals  Goals expected to be met by 3/20/2017      1. Pt will tolerate regular diet with thin liquids with no overt s/s of aspiration  2. Pt will participate in speech, language and cognitive evaluation to rule out cognitive linguistic deficits. GOAL MET  3. Pt will recall and utilize word finding strategies with 80% accuracy and min cues  4. Pt will complete reasoning tasks with 80% accuracy and min cues  5. Pt will participate in ongoing assessment of reading, writing, and visual spatial tasks                  Plan:   Patient to be seen Therapy Frequency: 5 x/week   Plan of Care expires: 04/10/17  Plan of Care reviewed with: patient, spouse  SLP Follow-up?: Yes  SLP - Next Visit Date: 03/17/17          Jeni Martinez M.A. CCC-SLP  Speech Language Pathologist  [phone]  3/16/2017

## 2017-03-16 NOTE — PLAN OF CARE
Problem: SLP Goal  Goal: SLP Goal  Speech Language Pathology Goals  Goals expected to be met by 3/20/2017      1. Pt will tolerate regular diet with thin liquids with no overt s/s of aspiration  2. Pt will participate in speech, language and cognitive evaluation to rule out cognitive linguistic deficits. GOAL MET  3. Pt will recall and utilize word finding strategies with 80% accuracy and min cues  4. Pt will complete reasoning tasks with 80% accuracy and min cues  5. Pt will participate in ongoing assessment of reading, writing, and visual spatial tasks      .Pt with continued progress towards goals.     Jeni Martinez M.A. CCC-SLP  Speech Language Pathologist  [phone]  3/16/2017

## 2017-03-16 NOTE — PROGRESS NOTES
[facility]  Adult Nutrition  Progress Note    SUMMARY     Recommendations    Recommendation/Intervention: 1.  Continue PO Cardiac diet as Rx  2.  Monitor PO intake  3.  Monitor Labs   4.  RD to follow  Goals: 1.  Pt continue to tolerate  2.  Pt to consume >75% of meals  Nutrition Goal Status: progressing towards goal  Communication of RD Recs: other (comment) (pt's spouse)    Continuum of Care Plan      Reason for Assessment    Reason for Assessment: RD follow-up  Diagnosis: other (see comments) (ICH)  Relevent Medical History: HTN   Interdisciplinary Rounds: did not attend     General Information Comments: Pt being evaluated by SLP (spoke w/ Spouse regarding pt)    Nutrition Prescription Ordered    Current Diet Order: Cardiac  Nutrition Order Comments: Pt w/ good PO intake       Evaluation of Received Nutrients/Fluid Intake    Nutrition Risk Screen     Nutrition Risk Screen: no indicators present    Nutrition/Diet History    Patient Reported Diet/Restrictions/Preferences: general  Typical Food/Fluid Intake: Spouse states pt eating 100% of all meals  Food Preferences: reviewed w/ pt's spouse  Factors Affecting Nutritional Intake: eating disorder(s), other (see comments) (none at this time)      Labs/Tests/Procedures/Meds    Pertinent Labs Reviewed: reviewed, pertinent  Pertinent Labs Comments: PO4 4.7  Pertinent Medications Reviewed: reviewed, pertinent  Pertinent Medications Comments: lisinopril,metroprolol    Physical Findings    Overall Physical Appearance: overweight     Oral/Mouth Cavity: WDL  Skin: intact    Anthropometrics    Height Method: Stated  Height (inches): 66.5 in  Weight Method: Bed Scale  Weight (kg): 80.5 kg     Ideal Body Weight (IBW), Female: 132.5 lb     % Ideal Body Weight, Female (lb): 133.94 lb  BMI (kg/m2): 28.22  BMI Grade: 25 - 29.9 - overweight    Anthropometrics (Special Considerations)      Estimated/Assessed Needs    Weight Used For Calorie Calculations: 80.5 kg  (177 lb 7.5 oz)   Height (cm): 168.9 cm     Energy Need Method: Kcal/kg     20-25kcals/kg/BW = 1600-2000kcals    RMR (Curry-St. Jeor Equation): 1354.42        Weight Used For Protein Calculations: 81.3 kg (179 lb 3.7 oz)  Protein Requirements: 80-97  1.0-1.2kgsProtein/kg/BW = 80-95gmsProtein  Fluid Need Method: other (see comments) (1ml/1kcal)  1600-2000ml Fluid    Monitor and Evaluation    Food and Nutrient Intake: food and beverage intake  Food and Nutrient Adminstration: diet order     Physical Activity and Function: nutrition-related ADLs and IADLs  Anthropometric Measurements: weight, weight change  Biochemical Data, Medical Tests and Procedures: electrolyte and renal panel, gastrointestinal profile, glucose/endocrine profile, inflammatory profile  Nutrition-Focused Physical Findings: overall appearance    Nutrition Risk    Level of Risk: low    Nutrition Follow-Up    RD Follow-up?: Yes    Assessment and Plan    Potential for Inadequate energy intake r/t ongoing speech therapy as evidenced by recent diet advancement

## 2017-03-20 ENCOUNTER — PATIENT OUTREACH (OUTPATIENT)
Dept: ADMINISTRATIVE | Facility: CLINIC | Age: 72
End: 2017-03-20
Payer: MEDICARE

## 2017-03-20 NOTE — PROGRESS NOTES
[facility]  Vascular Neurology  Comprehensive Stroke Center  Progress Note      Neurologic Chief Complaint: L frontal ICH    Subjective:     Interval History: Patient is seen for follow-up neurological assessment and treatment recommendations: SABINEON. Neurologically and medically stable. Discharged to inpatient rehab.    HPI, Past Medical, Family, and Social History remains the same as documented in the initial encounter.     Review of Systems   Constitutional: Negative for chills and fever.   HENT: Negative for drooling.    Eyes: Negative for redness.   Respiratory: Negative for cough and shortness of breath.    Cardiovascular: Negative for chest pain and palpitations.   Gastrointestinal: Negative for vomiting.   Musculoskeletal: Negative for joint swelling.   Skin: Negative for rash.   Neurological: Positive for facial asymmetry, speech difficulty and weakness. Negative for dizziness, numbness and headaches.   Psychiatric/Behavioral: Negative for agitation and behavioral problems.     Scheduled Meds:    Continuous Infusions:    PRN Meds:    Objective:     Vital Signs (Most Recent):  Temp: 98.6 °F (37 °C) (03/16/17 1539)  Pulse: 70 (03/16/17 1539)  Resp: 18 (03/16/17 1539)  BP: 126/65 (03/16/17 1539)  SpO2: 95 % (03/16/17 1539)  BP Location: Left arm    Vital Signs Range (Last 24H):     BP Location: Left arm    Physical Exam   Constitutional: She is oriented to person, place, and time. She appears well-developed and well-nourished. No distress.   HENT:   Head: Normocephalic and atraumatic.   Eyes: EOM are normal. Pupils are equal, round, and reactive to light.   Neck: Normal range of motion.   Cardiovascular: Normal rate.    Pulmonary/Chest: Effort normal. No respiratory distress.   Abdominal: Soft. She exhibits no distension.   Musculoskeletal: Normal range of motion.   Neurological: She is alert and oriented to person, place, and time. GCS eye subscore is 4 - spontaneous. GCS verbal subscore is  5 - oriented. GCS motor subscore is 6 - obeys commands.   Skin: Skin is warm and dry.   Psychiatric: She has a normal mood and affect. Her behavior is normal.   Vitals reviewed.      Neurological Exam:   LOC: alert and follows requests  Language: Expressive aphasia  Speech: Dysarthria  Orientation: Person, Place, Time  Memory: Recent memory intact, Remote memory intact, Age correct, Month correct  Visual Fields (CN II): Full  EOM (CN III, IV, VI): Full/intact  Oculocephalics: normal  Pupils (CN III, IV, VI): PERRL  Facial Sensation (CN V): Symmetric  Facial Movement (CN VII): lower weakness right lower  Hearing (CN VIII): intact bilaterally  Motor*: Arm Left:  Normal (5/5), Leg Left:   Normal (5/5), Arm Right:   Paretic:  4/5, Leg Right:   Paretic:  4/5  Cerebellar*: L sided ataxia  Sensation: intact to light touch  Tone: Arm-Left: normal; Leg-Left: normal; Arm-Right: normal; Leg-Right: normal    NIH Stroke Scale:    Level of Consciousness: 0 - alert  LOC Questions: 0 - answers both correctly  LOC Commands: 0 - performs both correctly  Best Gaze: 0 - normal  Visual: 0 - no visual loss (inconsistent visual exam.  Patient has more difficulty with lower quadrants than upper quadrants)  Facial Palsy: 0 - normal  Motor Left Arm: 0 - no drift  Motor Right Arm: 0 - no drift  Motor Left Le - drift  Motor Right Le - drift  Limb Ataxia: 1 - present in one limb  Sensory: 0 - normal  Best Language: 1 - mild to moderate aphasia  Dysarthria: 1 - mild to moderate dysarthria  Extinction and Inattention: 0 - no neglect  NIH Stroke Scale Total: 5  Ruth Coma Scale:  Best Eye Response: 4 - spontaneous  Best Motor Response: 6 - obeys commands  Best Verbal Response: 5 - oriented        Laboratory:  CMP:   No results for input(s): GLUCOSE, CALCIUM, ALBUMIN, PROT, NA, K, CO2, CL, BUN, CREATININE, ALKPHOS, ALT, AST, BILITOT in the last 24 hours.  CBC:     Recent Labs  Lab 17  0535   WBC 8.59   RBC 4.48   HGB 13.3   HCT 40.8       MCV 91   MCH 29.7   MCHC 32.6     Lipid Panel:   No results for input(s): CHOL, LDLCALC, HDL, TRIG in the last 168 hours.  Coagulation:   No results for input(s): INR, APTT in the last 168 hours.    Invalid input(s): PT  Platelet Aggregation Study: No results for input(s): PLTAGG, PLTAGINTERP, PLTAGREGLACO, ADPPLTAGGREG in the last 168 hours.  Hgb A1C:   No results for input(s): HGBA1C in the last 168 hours.  TSH:   No results for input(s): TSH in the last 168 hours.    Diagnostic Results:  I have personally reviewed:   Findings:     CT Head OSH. Date: 03/11/17  -L frontal ICH    MRI Head. Date: 03/11/17  -3.6 x 3.1 cm T1 iso-to hyperintense and T2 hyperintense region in the left parietal lobe with associated blood fluid levels, compatible with subacute parenchymal hemorrhage.  -Diffusion restriction within this region is nonspecific and may either represent hemorrhagic infarction or the anisotropy of subacute blood components.  -No additional areas of parenchymal hemorrhage identified on the GRE sequences.      MRA Head/Neck. Date: 03/11/17  -No evidence of vascular abnormality to explain the parenchymal hemorrhage in the left parietal lobe.  -No evidence of aneurysm or stenosis involving the intracranial vessels    Assessment/Plan:     Kaylin Neff is a 71 y.o. Female with L frontal ICH transferred from OSH for further care. Patient presents with RSW and speech difficulties with an NIH of 5. She was admitted to neuro ICU for further care. CT head confirmed L frontal ICH. MRA was negative for vascular abnormalities.     3/12/17 Patient has no complaints, has drift on right (arm>leg) with aphasia and R facial droop; remains on cardene    3/13 off cardene    3/15 BP remains well controlled on oral regimen. Ready for discharge.     3/16 Discharged      * Left-sided nontraumatic intracerebral hemorrhage  Kaylin Neff is a 71 y.o. female with PMHx of HTN with L frontal ICH. DDx includes mass/tumor,  bleeding infarct, hypertensive bleed  and amyloid angiopathy. Patient was seen by neurosurgeryand will f/u as outpatient with repeat imaging in 2 weeks.    Antithrombotics for secondary stroke prevention: None: Reason:  Hemorrhagic Stroke    Statins for secondary stroke prevention and hyperlipidemia, if present: Not required, given ICH.  However,  - continue Atorvastatin- 40 mg oral daily    Aggressive risk factor modification: Hypertension and Smoking, Rehab Efforts: Physical Therapy, Occupational Therapy and Speech and Language Pathology    Diagnostics: Ordered/Pending    VTE Prophylaxis: SCDs    BP Parameters: SBP <140    Nursing Orders: Neuro checks- Q1H, Swallowing evaluation by Nursing, Seizure precautions, Out of bed BID, Avoid Landaverde catheter, Pneumatic compression device, Stroke Education, Blood glucose target 100-130, Up with assistance     IV Fluids: adequate PO intake    Hemiparesis, right  2/2 stroke  PT/OT    Essential hypertension  Stroke risk factor  SBP <140  Lisinopril 20mg qd  Lopressor 25mg TID    Vasogenic cerebral edema  Evident on imaging  2/2 ICH    Cigarette smoker  Stroke risk factor   on cessation    Hyperlipidemia  Stroke risk factor  .6  Atorvastatin 40      Ivan Ramirez MD  Comprehensive Stroke Center  Department of Vascular Neurology   [facility]

## 2017-03-20 NOTE — SUBJECTIVE & OBJECTIVE
NIH Stroke Scale:  Interval: 7 days or at discharge (whichever comes first)  Level of Consciousness: 0 - alert  LOC Questions: 0 - answers both correctly  LOC Commands: 0 - performs both correctly  Best Gaze: 0 - normal  Visual: 0 - no visual loss (inconsistent visual exam.  Patient has more difficulty with lower quadrants than upper quadrants)  Facial Palsy: 0 - normal  Motor Left Arm: 0 - no drift  Motor Right Arm: 0 - no drift  Motor Left Le - drift  Motor Right Le - drift  Limb Ataxia: 1 - present in one limb  Sensory: 0 - normal  Best Language: 1 - mild to moderate aphasia  Dysarthria: 1 - mild to moderate dysarthria  Extinction and Inattention: 0 - no neglect  NIH Stroke Scale Total: 5  [place] Coma Scale:  Best Eye Response: 4 - spontaneous  Best Motor Response: 6 - obeys commands  Best Verbal Response: 5 - oriented    Modified Wilson Scale:   Timeline: At discharge  Modified Bk Score: 2 - slight disability

## 2017-03-20 NOTE — DISCHARGE SUMMARY
[facility]  Vascular Neurology  Comprehensive Stroke Center  Discharge Summary     Summary:     Admit Date: 3/11/2017  2:11 PM    Discharge Date and Time: 3/16/2017  3:48 PM    Attending Physician: No att. providers found     Discharge Provider: Ivan Ramirez MD    History of Present Illness: Kaylin Neff is a 71 y.o. Female with PMHx of HTN presented to OSH with slurred speech, R arm weakness, and balance issues. She was last known to be normal at 9am on 3/11/17. The patient states that she was in the bathroom washing her hair when her symptoms began. Telestroke call was made and head CT ordered. Initial NIH score was 5. Patient had L ICH on CT and was transferred to [facility] for further care.           Hospital Course (synopsis of major diagnoses, care, treatment, and services provided during the course of the hospital stay): Kaylin Neff is a 71 y.o. Female with L frontal ICH transferred from OSH for further care. Patient presents with RSW and speech difficulties with an NIH of 5. She was admitted to neuro ICU for further care. CT head confirmed L frontal ICH. MRA was negative for vascular abnormalities.     3/12/17 Patient has no complaints, has drift on right (arm>leg) with aphasia and R facial droop; remains on cardene    3/13 off cardene    3/15 BP remains well controlled on oral regimen. Ready for discharge.     3/16 Discharged      NIH Stroke Scale:  Interval: 7 days or at discharge (whichever comes first)  Level of Consciousness: 0 - alert  LOC Questions: 0 - answers both correctly  LOC Commands: 0 - performs both correctly  Best Gaze: 0 - normal  Visual: 0 - no visual loss (inconsistent visual exam.  Patient has more difficulty with lower quadrants than upper quadrants)  Facial Palsy: 0 - normal  Motor Left Arm: 0 - no drift  Motor Right Arm: 0 - no drift  Motor Left Le - drift  Motor Right Le - drift  Limb Ataxia: 1 - present in one limb  Sensory: 0 - normal  Best Language:  1 - mild to moderate aphasia  Dysarthria: 1 - mild to moderate dysarthria  Extinction and Inattention: 0 - no neglect  NIH Stroke Scale Total: 5  Ruth Coma Scale:  Best Eye Response: 4 - spontaneous  Best Motor Response: 6 - obeys commands  Best Verbal Response: 5 - oriented    Modified Bk Scale:   Timeline: At discharge  Modified Dougherty Score: 2 - slight disability          Assessment/Plan:     Interventions: None    Complications: None    Research Candidate?:  No    Neurological deficit at discharge: Weakness: right     Disposition: Home or Self Care    Final Active Diagnoses:    Diagnosis Date Noted POA    PRINCIPAL PROBLEM:  Left-sided nontraumatic intracerebral hemorrhage [I61.9] 03/11/2017 Yes    Intracranial bleed [I62.9]  Unknown    Hyperlipidemia [E78.5] 03/12/2017 Unknown    Vasogenic cerebral edema [G93.6] 03/11/2017 Yes    Cigarette smoker [F17.210] 03/11/2017 Yes    Hemiparesis, right [G81.91]  Yes    Essential hypertension [I10]  Yes      Problems Resolved During this Admission:    Diagnosis Date Noted Date Resolved POA     * Left-sided nontraumatic intracerebral hemorrhage  Kaylin Neff is a 71 y.o. female with PMHx of HTN with L frontal ICH. DDx includes mass/tumor, bleeding infarct, hypertensive bleed  and amyloid angiopathy. Patient was seen by neurosurgeryand will f/u as outpatient with repeat imaging in 2 weeks.    Antithrombotics for secondary stroke prevention: None: Reason:  Hemorrhagic Stroke    Statins for secondary stroke prevention and hyperlipidemia, if present: Not required, given ICH.  However,  - continue Atorvastatin- 40 mg oral daily    Aggressive risk factor modification: Hypertension and Smoking, Rehab Efforts: Physical Therapy, Occupational Therapy and Speech and Language Pathology    Diagnostics: Ordered/Pending    VTE Prophylaxis: SCDs    BP Parameters: SBP <140    Nursing Orders: Neuro checks- Q1H, Swallowing evaluation by Nursing, Seizure precautions,  Out of bed BID, Avoid Landaverde catheter, Pneumatic compression device, Stroke Education, Blood glucose target 100-130, Up with assistance     IV Fluids: adequate PO intake    Hemiparesis, right  2/2 stroke  PT/OT    Essential hypertension  Stroke risk factor  SBP <140  Lisinopril 20mg qd  Lopressor 25mg TID    Vasogenic cerebral edema  Evident on imaging  2/2 ICH    Cigarette smoker  Stroke risk factor   on cessation    Hyperlipidemia  Stroke risk factor  .6  Atorvastatin 40      Recommendations:     Post-discharge complication risks: Falls    Stroke Education given to: patient and family    Follow-up in Stroke Clinic in 28 days; Follow-up with Neurosurgery clinic in 2 weeks.    Discharge Plan:  Antithrombotics: None, hemorrhagic stroke  Statin: Atorvastatin 40mg  Aggresive risk factor modification:  Hypertension and Smoking    Follow Up:    Patient Instructions:   No discharge procedures on file.  Medications:  Reconciled Home Medications:   Discharge Medication List as of 3/16/2017  3:40 PM      START taking these medications    Details   metoprolol tartrate (LOPRESSOR) 25 MG tablet Take 1 tablet (25 mg total) by mouth 3 (three) times daily., Starting 3/16/2017, Until u 4/13/17, Normal         CONTINUE these medications which have CHANGED    Details   atorvastatin (LIPITOR) 40 MG tablet Take 1 tablet (40 mg total) by mouth once daily., Starting 3/16/2017, Until Fri 3/16/18, Normal      lisinopril (PRINIVIL,ZESTRIL) 20 MG tablet Take 1 tablet (20 mg total) by mouth once daily., Starting 3/16/2017, Until Fri 3/16/18, Normal         CONTINUE these medications which have NOT CHANGED    Details   naproxen sodium (ANAPROX) 220 MG tablet Take 220 mg by mouth nightly as needed., Until Discontinued, Historical Med             Ivan Ramirez MD  Comprehensive Stroke Center  Department of Vascular Neurology   [facility]

## 2017-03-20 NOTE — SUBJECTIVE & OBJECTIVE
Neurologic Chief Complaint: L frontal ICH    Subjective:     Interval History: Patient is seen for follow-up neurological assessment and treatment recommendations: YOSELYN. Neurologically and medically stable. Discharged to inpatient rehab.    HPI, Past Medical, Family, and Social History remains the same as documented in the initial encounter.     Review of Systems   Constitutional: Negative for chills and fever.   HENT: Negative for drooling.    Eyes: Negative for redness.   Respiratory: Negative for cough and shortness of breath.    Cardiovascular: Negative for chest pain and palpitations.   Gastrointestinal: Negative for vomiting.   Musculoskeletal: Negative for joint swelling.   Skin: Negative for rash.   Neurological: Positive for facial asymmetry, speech difficulty and weakness. Negative for dizziness, numbness and headaches.   Psychiatric/Behavioral: Negative for agitation and behavioral problems.     Scheduled Meds:    Continuous Infusions:    PRN Meds:    Objective:     Vital Signs (Most Recent):  Temp: 98.6 °F (37 °C) (03/16/17 1539)  Pulse: 70 (03/16/17 1539)  Resp: 18 (03/16/17 1539)  BP: 126/65 (03/16/17 1539)  SpO2: 95 % (03/16/17 1539)  BP Location: Left arm    Vital Signs Range (Last 24H):     BP Location: Left arm    Physical Exam   Constitutional: She is oriented to person, place, and time. She appears well-developed and well-nourished. No distress.   HENT:   Head: Normocephalic and atraumatic.   Eyes: EOM are normal. Pupils are equal, round, and reactive to light.   Neck: Normal range of motion.   Cardiovascular: Normal rate.    Pulmonary/Chest: Effort normal. No respiratory distress.   Abdominal: Soft. She exhibits no distension.   Musculoskeletal: Normal range of motion.   Neurological: She is alert and oriented to person, place, and time. GCS eye subscore is 4 - spontaneous. GCS verbal subscore is 5 - oriented. GCS motor subscore is 6 - obeys commands.   Skin: Skin is warm and dry.    Psychiatric: She has a normal mood and affect. Her behavior is normal.   Vitals reviewed.      Neurological Exam:   LOC: alert and follows requests  Language: Expressive aphasia  Speech: Dysarthria  Orientation: Person, Place, Time  Memory: Recent memory intact, Remote memory intact, Age correct, Month correct  Visual Fields (CN II): Full  EOM (CN III, IV, VI): Full/intact  Oculocephalics: normal  Pupils (CN III, IV, VI): PERRL  Facial Sensation (CN V): Symmetric  Facial Movement (CN VII): lower weakness right lower  Hearing (CN VIII): intact bilaterally  Motor*: Arm Left:  Normal (5/5), Leg Left:   Normal (5/5), Arm Right:   Paretic:  4/5, Leg Right:   Paretic:  4/5  Cerebellar*: L sided ataxia  Sensation: intact to light touch  Tone: Arm-Left: normal; Leg-Left: normal; Arm-Right: normal; Leg-Right: normal    NIH Stroke Scale:    Level of Consciousness: 0 - alert  LOC Questions: 0 - answers both correctly  LOC Commands: 0 - performs both correctly  Best Gaze: 0 - normal  Visual: 0 - no visual loss (inconsistent visual exam.  Patient has more difficulty with lower quadrants than upper quadrants)  Facial Palsy: 0 - normal  Motor Left Arm: 0 - no drift  Motor Right Arm: 0 - no drift  Motor Left Le - drift  Motor Right Le - drift  Limb Ataxia: 1 - present in one limb  Sensory: 0 - normal  Best Language: 1 - mild to moderate aphasia  Dysarthria: 1 - mild to moderate dysarthria  Extinction and Inattention: 0 - no neglect  NIH Stroke Scale Total: 5  [place] Coma Scale:  Best Eye Response: 4 - spontaneous  Best Motor Response: 6 - obeys commands  Best Verbal Response: 5 - oriented        Laboratory:  CMP:   No results for input(s): GLUCOSE, CALCIUM, ALBUMIN, PROT, NA, K, CO2, CL, BUN, CREATININE, ALKPHOS, ALT, AST, BILITOT in the last 24 hours.  CBC:     Recent Labs  Lab 17  0535   WBC 8.59   RBC 4.48   HGB 13.3   HCT 40.8      MCV 91   MCH 29.7   MCHC 32.6     Lipid Panel:   No results for input(s):  CHOL, LDLCALC, HDL, TRIG in the last 168 hours.  Coagulation:   No results for input(s): INR, APTT in the last 168 hours.    Invalid input(s): PT  Platelet Aggregation Study: No results for input(s): PLTAGG, PLTAGINTERP, PLTAGREGLACO, ADPPLTAGGREG in the last 168 hours.  Hgb A1C:   No results for input(s): HGBA1C in the last 168 hours.  TSH:   No results for input(s): TSH in the last 168 hours.    Diagnostic Results:  I have personally reviewed:   Findings:     CT Head OSH. Date: 03/11/17  -L frontal ICH    MRI Head. Date: 03/11/17  -3.6 x 3.1 cm T1 iso-to hyperintense and T2 hyperintense region in the left parietal lobe with associated blood fluid levels, compatible with subacute parenchymal hemorrhage.  -Diffusion restriction within this region is nonspecific and may either represent hemorrhagic infarction or the anisotropy of subacute blood components.  -No additional areas of parenchymal hemorrhage identified on the GRE sequences.      MRA Head/Neck. Date: 03/11/17  -No evidence of vascular abnormality to explain the parenchymal hemorrhage in the left parietal lobe.  -No evidence of aneurysm or stenosis involving the intracranial vessels

## 2017-03-20 NOTE — PATIENT INSTRUCTIONS
Discharge Instructions for Stroke  You have been diagnosed with a stroke, or with a TIA (transient ischemic attack). Or you have been identified as having a high risk for stroke. During a stroke, blood stops flowing to part of your brain. This can damage areas in the brain that control other parts of the body. Symptoms after a stroke depend on which part of the brain has been affected.  Stroke risk factors  Once youve had a stroke, youre at greater risk for another one. Listed below are some other factors that can increase your risk for a stroke:  · High blood pressure  · High cholesterol  · Cigarette or cigar smoking  · Diabetes  · Carotid or other artery disease  · Atrial fibrillation, atrial flutter, or other heart disease  · Not being physically active  · Obesity  · Certain blood disorders (such as sickle cell anemia)  · Excessive alcohol use  · Abuse of street drugs  · Race  · Gender  · Family history of stroke  · Diet high in salty, fried, or greasy foods  Changes in daily living  Doing your regular tasks may be difficult after youve had a stroke, but you can learn new ways to manage your daily activities. In fact, doing daily activities may help you to regain muscle strength and bring back function to affected limbs. Be patient, give yourself time to adjust, and appreciate the progress you make.  Daily activities  You may be at risk of falling. Make changes to your home to help you walk more easily. A therapist will decide if you need an assistive device to walk safely.  You may need to see an occupational therapist or physical therapist to learn new ways of doing things. For example, you may need to make adjustments when bathing or dressing:  Tips for showering or bathing  · Test the water temperature with a hand or foot that was not affected by the stroke.  · Use grab bars, a shower seat, a hand-held showerhead, and a long-handled brush.  Tips for getting dressed  · Dress while sitting, starting  with the affected side or limb.  · Wear shirts that pull easily over your head. Wear pants or skirts with elastic waistbands.  · Use zippers with loops attached to the pull tabs.  Lifestyle changes  · Take your medicines exactly as directed. Dont skip doses.  · Begin an exercise program. Ask your provider how to get started. Also ask how much activity you should try to get on a daily or weekly basis. You can benefit from simple activities such as walking or gardening.  · Limit alcohol intake. Men should have no more than 2 alcoholic drinks a day. Women should limit themselves to 1 alcoholic drink per day.  · Know your cholesterol level. Follow your providers recommendations about how to keep cholesterol under control.  · If you are a smoker, quit now. Join a stop-smoking program to improve your chances of success. Ask your provider about medicines or other methods to help you quit.  · Learn stress management techniques to help you deal with stress in your home and work life.  Diet  Your healthcare provider will give you information on dietary changes that you may need to make, based on your situation. Your provider may recommend that you see a registered dietitian for help with diet changes. Changes may include:  · Reducing fat and cholesterol intake  · Reducing salt (sodium) intake, especially if you have high blood pressure  · Eating more fresh vegetables and fruits  · Eating more lean proteins, such as fish, poultry, and beans and peas (legumes)  · Eating less red meat and processed meats  · Using low-fat dairy products  · Limiting vegetable oils and nut oils  · Limiting sweets and processed foods such as chips, cookies, and baked goods  Follow-up care  · Keep your medical appointments. Close follow-up is important to stroke rehabilitation and recovery.  · Some medicines require blood tests to check for progress or problems. Keep follow-up appointments for any blood tests ordered by your providers.  When to  call 911  Call 911 right away if you have any of the following symptoms of stroke:  · Weakness, tingling, or loss of feeling on one side of your face or body  · Sudden double vision or trouble seeing in one or both eyes  · Sudden trouble talking or slurred speech  · Trouble understanding others  · Sudden, severe headache  · Dizziness, loss of balance, or a sense of falling  · Blackouts or seizures      F.A.S.T. is an easy way to remember the signs of stroke. When you see these signs, you know that you need to call 911 fast.  F.A.S.T. stands for:  · F is for face drooping. One side of the face is drooping or numb. When the person smiles, the smile is uneven.  · A is for arm weakness. One arm is weak or numb. When the person lifts both arms at the same time, one arm may drift downward.  · S is for speech difficulty. You may notice slurred speech or trouble speaking. The person can't repeat a simple sentence correctly when asked.  · T is for time to call 911. If someone shows any of these symptoms, even if they go away, call 911 immediately. Make note of the time the symptoms first appeared.  Date Last Reviewed: 8/26/2015 © 2000-2016 [employer]. [address] [place] [address], [place] [address]. All rights reserved. This information is not intended as a substitute for professional medical care. Always follow your healthcare professional's instructions.

## 2017-03-21 ENCOUNTER — TELEPHONE (OUTPATIENT)
Dept: NEUROSURGERY | Facility: CLINIC | Age: 72
End: 2017-03-21

## 2017-03-21 NOTE — TELEPHONE ENCOUNTER
----- Message from Sherri Ennis sent at 3/21/2017  2:41 PM CDT -----  Contact:    is calling to speak with Staff because the pt had a stroke and is suppose to have rehab but need a ICD-10 added to order code for the pt to start treatment.   is upset and says she needs professional help and needs Staff to contact him right away.    He can be reached at [phone].    Thank you.

## 2017-03-21 NOTE — TELEPHONE ENCOUNTER
Reached out to Elicia Eli RN with case management regarding ICD-10 code. [facility]

## 2017-03-21 NOTE — TELEPHONE ENCOUNTER
----- Message from Luis Antonio Walton sent at 3/21/2017  2:58 PM CDT -----  Greer at [facility] is waiting on the ICD10.

## 2017-03-21 NOTE — TELEPHONE ENCOUNTER
Reached out to Elicia Eli RN with case management regarding ICD-10 code. [facility]

## 2017-03-22 ENCOUNTER — TELEPHONE (OUTPATIENT)
Dept: NEUROSURGERY | Facility: HOSPITAL | Age: 72
End: 2017-03-22

## 2017-03-22 NOTE — TELEPHONE ENCOUNTER
"Called number on file.  Spoke with patient's  Eboni Neff, who is primary caregiver.  Risk factors specific to patient for stroke discussed with teach back implemented.  Patient's  verbalized understanding of discharge instructions and medications.  Patient's  stated "We are having trouble with her filling her outpatient therapy at [facility].  They say they need more things from [facility].  Can you help me with this?"  Informed patient's  that I would follow up on the matter.  Patient's  was asked about discharge appointments and follow up care.  Follow appointment scheduled for 3/27/2017 at 1520. Warning sings discussed with teach back discussion method implemented.  Notified to seek immediate medical help via 911 if new or worsening stroke symptoms occur.  Patient nor  relayed any new questions or comments at this time.  Instructed to call [facility] with any further questions.  "

## 2017-03-22 NOTE — TELEPHONE ENCOUNTER
Called number on file.  Spoke with patient's Kvng Neff, who is primary caregiver.  Informed patient's  that I communicated with Greer at [facility] and that the appropriate paperwork was received and that they should be calling him shortly. Patient nor  relayed any new questions or comments at this time.  Instructed to call [facility] with any further questions.

## 2017-03-27 ENCOUNTER — HOSPITAL ENCOUNTER (OUTPATIENT)
Dept: RADIOLOGY | Facility: HOSPITAL | Age: 72
Discharge: HOME | End: 2017-03-27
Attending: STUDENT IN AN ORGANIZED HEALTH CARE EDUCATION/TRAINING PROGRAM
Payer: MEDICARE

## 2017-03-27 ENCOUNTER — OFFICE VISIT (OUTPATIENT)
Dept: NEUROSURGERY | Facility: CLINIC | Age: 72
End: 2017-03-27
Payer: MEDICARE

## 2017-03-27 VITALS
SYSTOLIC BLOOD PRESSURE: 111 MMHG | WEIGHT: 176.81 LBS | BODY MASS INDEX: 27.75 KG/M2 | HEART RATE: 90 BPM | HEIGHT: 67 IN | TEMPERATURE: 98 F | DIASTOLIC BLOOD PRESSURE: 58 MMHG

## 2017-03-27 DIAGNOSIS — I61.1 NONTRAUMATIC CORTICAL HEMORRHAGE OF LEFT CEREBRAL HEMISPHERE: Primary | ICD-10-CM

## 2017-03-27 DIAGNOSIS — I61.1 NONTRAUMATIC CORTICAL HEMORRHAGE OF LEFT CEREBRAL HEMISPHERE: ICD-10-CM

## 2017-03-27 PROBLEM — M62.81 MUSCLE WEAKNESS OF LOWER EXTREMITY: Status: ACTIVE | Noted: 2017-03-27

## 2017-03-27 PROBLEM — Z78.9 IMPAIRED MOBILITY AND ADLS: Status: ACTIVE | Noted: 2017-03-27

## 2017-03-27 PROBLEM — G81.91 RIGHT HEMIPARESIS: Status: ACTIVE | Noted: 2017-03-27

## 2017-03-27 PROBLEM — R41.89 IMPAIRED PROBLEM SOLVING: Status: ACTIVE | Noted: 2017-03-27

## 2017-03-27 PROBLEM — I69.110: Status: ACTIVE | Noted: 2017-03-27

## 2017-03-27 PROBLEM — R47.89 WORD FINDING DIFFICULTY: Status: ACTIVE | Noted: 2017-03-27

## 2017-03-27 PROBLEM — R29.3 ABNORMAL POSTURE: Status: ACTIVE | Noted: 2017-03-27

## 2017-03-27 PROBLEM — R41.3 POOR SHORT TERM MEMORY: Status: ACTIVE | Noted: 2017-03-27

## 2017-03-27 PROBLEM — Z74.09 IMPAIRED MOBILITY AND ADLS: Status: ACTIVE | Noted: 2017-03-27

## 2017-03-27 PROBLEM — R26.2 DIFFICULTY IN WALKING: Status: ACTIVE | Noted: 2017-03-27

## 2017-03-27 PROBLEM — R29.898 RIGHT ARM WEAKNESS: Status: ACTIVE | Noted: 2017-03-27

## 2017-03-27 PROBLEM — R27.8 IMPAIRED COORDINATION OF UPPER EXTREMITY: Status: ACTIVE | Noted: 2017-03-27

## 2017-03-27 PROCEDURE — 99999 PR PBB SHADOW E&M-EST. PATIENT-LVL III: CPT | Mod: PBBFAC,,, | Performed by: PHYSICIAN ASSISTANT

## 2017-03-27 PROCEDURE — 3078F DIAST BP <80 MM HG: CPT | Mod: S$GLB,,, | Performed by: PHYSICIAN ASSISTANT

## 2017-03-27 PROCEDURE — 70553 MRI BRAIN STEM W/O & W/DYE: CPT | Mod: 26,,, | Performed by: RADIOLOGY

## 2017-03-27 PROCEDURE — 3074F SYST BP LT 130 MM HG: CPT | Mod: S$GLB,,, | Performed by: PHYSICIAN ASSISTANT

## 2017-03-27 PROCEDURE — 1126F AMNT PAIN NOTED NONE PRSNT: CPT | Mod: S$GLB,,, | Performed by: PHYSICIAN ASSISTANT

## 2017-03-27 PROCEDURE — 99213 OFFICE O/P EST LOW 20 MIN: CPT | Mod: S$GLB,,, | Performed by: PHYSICIAN ASSISTANT

## 2017-03-27 PROCEDURE — 1160F RVW MEDS BY RX/DR IN RCRD: CPT | Mod: S$GLB,,, | Performed by: PHYSICIAN ASSISTANT

## 2017-03-27 PROCEDURE — 1159F MED LIST DOCD IN RCRD: CPT | Mod: S$GLB,,, | Performed by: PHYSICIAN ASSISTANT

## 2017-03-27 PROCEDURE — 1157F ADVNC CARE PLAN IN RCRD: CPT | Mod: S$GLB,,, | Performed by: PHYSICIAN ASSISTANT

## 2017-03-27 RX ORDER — GADOBUTROL 604.72 MG/ML
9 INJECTION INTRAVENOUS
Status: COMPLETED | OUTPATIENT
Start: 2017-03-27 | End: 2017-03-27

## 2017-03-27 RX ADMIN — GADOBUTROL 9 ML: 604.72 INJECTION INTRAVENOUS at 02:03

## 2017-03-27 NOTE — PROGRESS NOTES
[facility]  Neurosurgery    SUBJECTIVE:     History of Present Illness:  Patient is a 71 y.o. female with PMHx HTN, HLD who presented to [facility] 3/11/17 with right arm weakness, slurred speech, & balance issues who was found to have a left ICH.  She was discharged 3/16/17.  Although PT/OT recommended inpatient rehab, her insurance required she go home & then be admitted as an outpatient.   She was admitted today to [facility].  She reports no headaches or seizures.  Her  accompanies her to the visit today & reports improvement every day.  Her gait is improving and control of her RUE is also improving.  She denies any new weakness or paresthesias.  She still has paresthesias in her RUE from the elbow to the hand.      Review of patient's allergies indicates:  No Known Allergies    Past Medical History:   Diagnosis Date    Hypertension     Smokes 1 to 10 cigarettes per day 03/11/2017     History reviewed. No pertinent surgical history.  History reviewed. No pertinent family history.  Social History   Substance Use Topics    Smoking status: Former Smoker     Years: 55.00     Types: Cigarettes     Quit date: 3/11/2017    Smokeless tobacco: None    Alcohol use None        Review of Systems:  Constitutional: no fever or chills  Eyes: no visual changes  ENT: no nasal congestion or sore throat  Respiratory: no cough or shortness of breath  Cardiovascular: no chest pain or palpitations  Gastrointestinal: no nausea or vomiting, tolerating diet  Genitourinary: no hematuria or dysuria  Integument/Breast: no rash or pruritis  Hematologic/Lymphatic: no easy bruising or lymphadenopathy  Musculoskeletal: no arthralgias or myalgias  Neurological: no seizures or tremors, positive for weakness  Behavioral/Psych: no auditory or visual hallucinations  Endocrine: no heat or cold intolerance    OBJECTIVE:     Vital Signs (Most Recent):  Temp: 97.8 °F (36.6 °C) (03/27/17 1517)  Pulse: 90 (03/27/17 1517)  BP:  (!) 111/58 (03/27/17 0357)    Physical Exam:  General: well developed, well nourished, no distress  Head: normocephalic, atraumatic  Neurologic: Alert and oriented. Thought content appropriate  GCS: Motor: 6/Verbal: 5/Eyes: 4 GCS Total: 15  Mental Status: Awake, Alert, Oriented x 4  Language: Positive for expressive & receptive aphasia  Speech: Slight dysarthria  Cranial nerves: face symmetric, tongue midline, CN II-XII grossly intact.   Eyes: pupils equal, round, reactive to light with accommodation, EOMI. Unable to appreciate optic disc. Red reflex intact bilaterally.   Pulmonary: normal respirations, not labored, no accessory muscles used  Abdomen: soft, non-distended, not tender to palpation  Sensory: intact to light touch throughout  Motor Strength: Moves all extremities spontaneously with good tone.  Trouble with control & fine motor movements of RUE.  No abnormal movements seen.     Strength  Deltoids Triceps Biceps Wrist Extension Wrist Flexion Hand    Upper: R 4/5 4/5 4/5 4/5 4/5 4/5    L 5/5 5/5 5/5 5/5 5/5 5/5     Iliopsoas Quadriceps Knee  Flexion Tibialis  anterior Gastro- cnemius EHL   Lower: R 4+/5 4+/5 4=/5 5/5 5/5 5/5    L 5/5 5/5 5/5 5/5 5/5 5/5     DTR's - 2 + and symmetric triceps, biceps, brachioradialis, patellar, & achilles  Pronator Drift: no drift noted  Finger-to-nose: Intact bilaterally  Lou: absent  Clonus: absent  Babinski: absent  Pulses: 2+ and symmetric radial and dorsalis pedis  Skin: warm, dry and intact, no rashes  Gait: slow, right limp     Tandem Gait: Difficulty        Unable to walk on heels & toes      Diagnostic Results:  I personally reviewed MRI Brain & agree with the findings: Evolving intraparenchymal hemorrhage left parietal lobe compatible with late subacute age parenchymal hemorrhage. Allowing for artifact from T1 hyperintensity related to subacute age blood products, there is no definite enhancing lesion within or about the collection. Clinical correlation and  continued followup advised.    Mild localized mass effect without midline shift or hydrocephalus.    ASSESSMENT/PLAN:     Patient is a 71 y.o. female with PMHx HTN, HLD who presented to [facility] 3/11/17 with right arm weakness, slurred speech, & balance issues who was found to have a left ICH.  She was discharged 3/16/17.  -Neurologically improving  -MRI without new hemorrhage  -Continue PT/OT/ST at rehab  -MRI brain still has a significant amount of aging blood on MRI & edema surrounding that, so it's hard to tell if there could be an underlying mass - will schedule MRI brain in 2 months as follow up  -Discussed with Dr. Barroso    Please feel free to call with any further questions    Lacey Omer PA-C  Neurosurgery  [phone]

## 2017-03-27 NOTE — MR AVS SNAPSHOT
"    [facility] - Neurosurgery 7th Fl  [address]  [facility] [address]  Phone: [phone]                  Kaylin Neff   3/27/2017 3:20 PM   Office Visit    Description:  Female : 1945   Provider:  Lacey Omer PA-C   Department:  [facility] - Neurosurgery [facility]           Diagnoses this Visit        Comments    Nontraumatic cortical hemorrhage of left cerebral hemisphere    -  Primary            To Do List           Future Appointments        Provider Department Dept Phone    2017 12:45 PM [facility] MRI1 [facility] [phone]    2017 2:00 PM Lacey Omer PA-C [facility] Neurosurgery [facility] [phone]      Goals (5 Years of Data)     None      [facility]     [facility] Nurse Care Line -  Assistance  Registered nurses in the [facility] provide clinical advisement, health education, appointment booking, and other advisory services.  Call for this free service at [phone].             Medications           STOP taking these medications     atorvastatin (LIPITOR) 40 MG tablet Take 1 tablet (40 mg total) by mouth once daily.           Verify that the below list of medications is an accurate representation of the medications you are currently taking.  If none reported, the list may be blank. If incorrect, please contact your healthcare provider. Carry this list with you in case of emergency.           Current Medications     lisinopril (PRINIVIL,ZESTRIL) 20 MG tablet Take 1 tablet (20 mg total) by mouth once daily.    metoprolol tartrate (LOPRESSOR) 25 MG tablet Take 1 tablet (25 mg total) by mouth 3 (three) times daily.    naproxen sodium (ANAPROX) 220 MG tablet Take 220 mg by mouth nightly as needed.           Clinical Reference Information           Your Vitals Were     BP Pulse Temp Height Weight BMI    111/58 90 97.8 °F (36.6 °C) (Oral) 5' 6.5" (1.689 m) 80.2 kg (176 lb 12.8 oz) 28.11 kg/m2      Blood Pressure          Most " Recent Value    BP  (!)  111/58      Allergies as of 3/27/2017     No Known Allergies      Immunizations Administered on Date of Encounter - 3/27/2017     None      Orders Placed During Today's Visit     Future Labs/Procedures Expected by Expires    MRI Brain W WO Contrast  3/27/2017 3/27/2018      [facility] Sign-Up     Activating your [facility] account is as easy as 1-2-3!     1) Visit [email or web address], select Sign Up Now, enter this activation code and your date of birth, then select Next.  [email or web address]  Expires: 4/30/2017  3:40 PM      2) Create a username and password to use when you visit [facility] in the future and select a security question in case you lose your password and select Next.    3) Enter your e-mail address and click Sign Up!    Additional Information  If you have questions, please e-mail [email or web address] or call [phone] to talk to our [facility] staff. Remember, [facility] is NOT to be used for urgent needs. For medical emergencies, dial 911.         Language Assistance Services     ATTENTION: Language assistance services are available, free of charge. Please call [phone].      ATENCIÓN: Si habla luis, tiene a alejandra disposición servicios gratuitos de asistencia lingüística. Llame al [phone].     CHÚ Ý: N?u b?n nói Ti?ng Vi?t, có các d?ch v? h? tr? ngôn ng? mi?n phí dành cho b?n. G?i s? [phone].         [facility]ending [facility] to [facility] complies with applicable Federal civil rights laws and does not discriminate on the basis of race, color, national origin, age, disability, or sex.

## 2017-03-27 NOTE — LETTER
March 29, 2017      Martha Sandhu MD  [address] [facility] [address]           [facility] - Neurosurgery 7th Fl  [address]  [place] [address]  Phone: [phone]          Patient: Kaylin Neff   MR Number: [id number]   YOB: 1945   Date of Visit: 3/27/2017       Dear Dr. Martha Sandhu:    Thank you for referring Kaylin Neff to me for evaluation. Attached you will find relevant portions of my assessment and plan of care.    If you have questions, please do not hesitate to call me. I look forward to following Kaylin Neff along with you.    Sincerely,    Lacey Omer PA-C    Enclosure  CC:  No Recipients    If you would like to receive this communication electronically, please contact [email or web address] or [phone] to request more information on [employer] access.    For providers and/or their staff who would like to refer a patient to [facility], please contact us through our one-stop-shop provider referral line, [facility], at [phone].    If you feel you have received this communication in error or would no longer like to receive these types of communications, please e-mail [email or web address]

## 2017-05-15 PROBLEM — I69.110: Status: RESOLVED | Noted: 2017-03-27 | Resolved: 2017-05-15

## 2017-05-23 ENCOUNTER — OFFICE VISIT (OUTPATIENT)
Dept: NEUROSURGERY | Facility: CLINIC | Age: 72
End: 2017-05-23
Payer: MEDICARE

## 2017-05-23 ENCOUNTER — HOSPITAL ENCOUNTER (OUTPATIENT)
Dept: RADIOLOGY | Facility: HOSPITAL | Age: 72
Discharge: HOME | End: 2017-05-23
Attending: NEUROLOGICAL SURGERY
Payer: MEDICARE

## 2017-05-23 VITALS
BODY MASS INDEX: 27.56 KG/M2 | WEIGHT: 175.63 LBS | TEMPERATURE: 97 F | SYSTOLIC BLOOD PRESSURE: 128 MMHG | HEIGHT: 67 IN | DIASTOLIC BLOOD PRESSURE: 78 MMHG | HEART RATE: 86 BPM

## 2017-05-23 DIAGNOSIS — S06.350D: Primary | ICD-10-CM

## 2017-05-23 DIAGNOSIS — I61.1 NONTRAUMATIC CORTICAL HEMORRHAGE OF LEFT CEREBRAL HEMISPHERE: ICD-10-CM

## 2017-05-23 PROCEDURE — 70553 MRI BRAIN STEM W/O & W/DYE: CPT | Mod: 26,,, | Performed by: RADIOLOGY

## 2017-05-23 PROCEDURE — 99213 OFFICE O/P EST LOW 20 MIN: CPT | Mod: S$GLB,,, | Performed by: PHYSICIAN ASSISTANT

## 2017-05-23 PROCEDURE — 99999 PR PBB SHADOW E&M-EST. PATIENT-LVL III: CPT | Mod: PBBFAC,,, | Performed by: PHYSICIAN ASSISTANT

## 2017-05-23 PROCEDURE — 1126F AMNT PAIN NOTED NONE PRSNT: CPT | Mod: S$GLB,,, | Performed by: PHYSICIAN ASSISTANT

## 2017-05-23 PROCEDURE — 1159F MED LIST DOCD IN RCRD: CPT | Mod: S$GLB,,, | Performed by: PHYSICIAN ASSISTANT

## 2017-05-23 RX ORDER — GADOBUTROL 604.72 MG/ML
8 INJECTION INTRAVENOUS
Status: COMPLETED | OUTPATIENT
Start: 2017-05-23 | End: 2017-05-23

## 2017-05-23 RX ADMIN — GADOBUTROL 8 ML: 604.72 INJECTION INTRAVENOUS at 01:05

## 2017-05-23 NOTE — LETTER
May 25, 2017      Martha Sandhu MD  [address] [place] [address]           [facility] - Neurosurgery 7th Fl  [address]  [place] [address]  Phone: [phone]          Patient: Kaylin Neff   MR Number: [id number]   YOB: 1945   Date of Visit: 5/23/2017       Dear Dr. Martha Sandhu:    Thank you for referring Kaylin Neff to me for evaluation. Attached you will find relevant portions of my assessment and plan of care.    If you have questions, please do not hesitate to call me. I look forward to following Kaylin Neff along with you.    Sincerely,    Lacey Omer PA-C    Enclosure  CC:  No Recipients    If you would like to receive this communication electronically, please contact [email or web address] or [phone] to request more information on [employer] Link access.    For providers and/or their staff who would like to refer a patient to [facility], please contact us through our one-stop-shop provider referral line, [facility], at [phone].    If you feel you have received this communication in error or would no longer like to receive these types of communications, please e-mail [email or web address]

## 2017-05-26 NOTE — PROGRESS NOTES
[facility]  Neurosurgery    SUBJECTIVE:     History of Present Illness:  Patient is a 71 y.o. female  with PMHx HTN, HLD who presented to [facility] 3/11/17 with right arm weakness, slurred speech, & balance issues who was found to have a left ICH.  She was discharged 3/16/17.  She was last seen in clinic on 3/27/17 and was in inpatient rehab.  She is accompanied by her .  She states she is doing very well.  She denies any headaches or gait imbalance.  Her RUE/RLE control is improving.  She was discharged from rehab and is now only requiring speech therapy.  She is still having some expressive aphasia, but also much improved.      Review of patient's allergies indicates:  No Known Allergies    Past Medical History:   Diagnosis Date    Hypertension     Smokes 1 to 10 cigarettes per day 03/11/2017     No past surgical history on file.  No family history on file.  Social History   Substance Use Topics    Smoking status: Former Smoker     Years: 55.00     Types: Cigarettes     Quit date: 3/11/2017    Smokeless tobacco: Not on file    Alcohol use Not on file        Review of Systems:  Constitutional: no fever or chills  Eyes: no visual changes  ENT: no nasal congestion or sore throat  Respiratory: no cough or shortness of breath  Cardiovascular: no chest pain or palpitations  Gastrointestinal: no nausea or vomiting, tolerating diet  Genitourinary: no hematuria or dysuria  Integument/Breast: no rash or pruritis  Hematologic/Lymphatic: no easy bruising or lymphadenopathy  Musculoskeletal: no arthralgias or myalgias  Neurological: no seizures or tremors, positive for weakness, expressive aphasia  Behavioral/Psych: no auditory or visual hallucinations  Endocrine: no heat or cold intolerance    OBJECTIVE:     Vital Signs (Most Recent):  Temp: 97.4 °F (36.3 °C) (05/23/17 1355)  Pulse: 86 (05/23/17 1355)  BP: 128/78 (05/23/17 1355)    Physical Exam:  General: well developed, well nourished, no distress  Head:  normocephalic, atraumatic  Neurologic: Alert and oriented. Thought content appropriate  GCS: Motor: 6/Verbal: 5/Eyes: 4 GCS Total: 15  Mental Status: Awake, Alert, Oriented x 4  Language: No aphasia  Speech: No dysarthria  Cranial nerves: face symmetric, tongue midline, CN II-XII grossly intact.   Eyes: pupils equal, round, reactive to light with accommodation, EOMI. Unable to appreciate optic disc. Red reflex intact bilaterally.   Pulmonary: normal respirations, not labored, no accessory muscles used  Abdomen: soft, non-distended, not tender to palpation  Sensory: intact to light touch throughout  Motor Strength: Moves all extremities spontaneously with good tone.  No abnormal movements seen.     Strength  Deltoids Triceps Biceps Wrist Extension Wrist Flexion Hand    Upper: R 4+/5 4+/5 4+/5 4+/5 4+/5 4+/5    L 5/5 5/5 5/5 5/5 5/5 5/5     Iliopsoas Quadriceps Knee  Flexion Tibialis  anterior Gastro- cnemius EHL   Lower: R 5/5 5/5 5/5 5/5 5/5 5/5    L 5/5 5/5 5/5 5/5 5/5 5/5   Trouble with control of RUE, but much improved from prior  DTR's - 2 + and symmetric triceps, biceps, brachioradialis, patellar, & achilles  Pronator Drift: no drift noted  Finger-to-nose: Intact bilaterally  Lou: absent  Clonus: absent  Babinski: absent  Pulses: 2+ and symmetric radial and dorsalis pedis      Diagnostic Results:  I personally reviewed MRI Brain & agree with the findings: Evolving left parietal parenchymal hemorrhage significant in the reduced mass effect now with encephalomalacia in this region.    Continued blood products underlying to recent compatible with subacute aged hemorrhage with thin component of enhancement along the parietal cortex anterior to the residual blood products suggestive for reactive enhancement and subacute aged region of infarction.    Clinical correlation and continued followup advised to assure no underlying lesion.    No evidence for significant new parenchymal signal abnormality or  hydrocephalus.    ASSESSMENT/PLAN:     Patient is a 71 y.o. female  with PMHx HTN, HLD who presented to [facility] 3/11/17 with right arm weakness, slurred speech, & balance issues who was found to have a left ICH.  She was discharged 3/16/17.   -Neurologically stable  -MRI Brain shows evolving left IPH, as expected, no new hemorrhage  -Pt doing well overall, will have her follow up in clinic PRN  -Discussed with Dr. Barroso    Please feel free to call with any further questions    Lacey Omer PA-C  Neurosurgery  [phone]

## 2017-06-08 PROBLEM — R47.89 WORD FINDING DIFFICULTY: Status: RESOLVED | Noted: 2017-03-27 | Resolved: 2017-06-08

## 2017-06-08 PROBLEM — R41.3 POOR SHORT TERM MEMORY: Status: RESOLVED | Noted: 2017-03-27 | Resolved: 2017-06-08

## 2017-06-08 PROBLEM — R41.89 IMPAIRED PROBLEM SOLVING: Status: RESOLVED | Noted: 2017-03-27 | Resolved: 2017-06-08

## 2018-03-30 RX ORDER — LISINOPRIL 20 MG/1
TABLET ORAL
Qty: 90 TABLET | Refills: 3 | Status: SHIPPED | OUTPATIENT
Start: 2018-03-30 | End: 2022-11-16 | Stop reason: SDUPTHER

## 2019-01-08 NOTE — PT/OT/SLP DISCHARGE
Occupational Therapy Discharge Summary    Kaylin Neff  MRN: [id number]   Principal Problem: Left-sided nontraumatic intracerebral hemorrhage      Patient Discharged from acute Occupational Therapy on 3/16/2017.  Please refer to prior OT note for functional status.    Assessment:      Patient appropriate for care in another setting.    Objective:     GOALS:   Multidisciplinary Problems     Occupational Therapy Goals        Problem: Occupational Therapy Goal    Goal Priority Disciplines Outcome Interventions   Occupational Therapy Goal     OT, PT/OT Ongoing (interventions implemented as appropriate)    Description:  Goals to be met by: 3/22/2017; Revisions made 3/14    Patient will increase functional independence with ADLs by performing:    Feeding with Set-up Assistance and min(a) with AD as needed.  UE Dressing while seated with Stand-by Assistance.  LE Dressing with moderate assistance.  Grooming while standing with Minimal Assistance.  Toileting from bedside commode with Minimal Assistance for hygiene and clothing management.   Bathing from edge of bed with Moderate Assistance.  Sitting at edge of bed x20 minutes with Stand-by Assistance.  Supine to sit with contact guard Assistance.  Stand pivot transfers with Contact Guard Assistance. Met 3/14  -Revised: with supervision  Toilet transfer to bedside commode with SBA.                       Reasons for Discontinuation of Therapy Services  Transfer to alternate level of care.      Plan:     Patient Discharged to: recommended rehab, denied by insurance. Pt will receive OPPT/OT/SLP    BESSIE Del Rosario  1/8/2019

## 2019-05-29 RX ORDER — LISINOPRIL 20 MG/1
TABLET ORAL
Qty: 90 TABLET | Refills: 3 | OUTPATIENT
Start: 2019-05-29

## 2022-07-25 ENCOUNTER — HOSPITAL ENCOUNTER (OUTPATIENT)
Facility: HOSPITAL | Age: 77
LOS: 1 days | Discharge: HOME | End: 2022-07-26
Attending: HOSPITALIST | Admitting: HOSPITALIST
Payer: MEDICARE

## 2022-07-25 DIAGNOSIS — R29.90 STROKE-LIKE SYMPTOM: ICD-10-CM

## 2022-07-25 DIAGNOSIS — I63.9 STROKE: ICD-10-CM

## 2022-07-25 DIAGNOSIS — G45.9 TIA (TRANSIENT ISCHEMIC ATTACK): ICD-10-CM

## 2022-07-25 DIAGNOSIS — R29.90 STROKE-LIKE SYMPTOMS: Primary | ICD-10-CM

## 2022-07-25 PROCEDURE — 63600175 PHARM REV CODE 636 W HCPCS: Performed by: NURSE PRACTITIONER

## 2022-07-25 PROCEDURE — G0378 HOSPITAL OBSERVATION PER HR: HCPCS

## 2022-07-25 PROCEDURE — 96372 THER/PROPH/DIAG INJ SC/IM: CPT | Performed by: NURSE PRACTITIONER

## 2022-07-25 RX ORDER — LABETALOL HYDROCHLORIDE 5 MG/ML
10 INJECTION, SOLUTION INTRAVENOUS
Status: DISCONTINUED | OUTPATIENT
Start: 2022-07-25 | End: 2022-07-26 | Stop reason: HOSPADM

## 2022-07-25 RX ORDER — ENOXAPARIN SODIUM 100 MG/ML
40 INJECTION SUBCUTANEOUS EVERY 24 HOURS
Status: DISCONTINUED | OUTPATIENT
Start: 2022-07-25 | End: 2022-07-26 | Stop reason: HOSPADM

## 2022-07-25 RX ORDER — SODIUM CHLORIDE 0.9 % (FLUSH) 0.9 %
10 SYRINGE (ML) INJECTION
Status: DISCONTINUED | OUTPATIENT
Start: 2022-07-25 | End: 2022-07-26 | Stop reason: HOSPADM

## 2022-07-25 RX ORDER — ATORVASTATIN CALCIUM 40 MG/1
80 TABLET, FILM COATED ORAL DAILY
Status: DISCONTINUED | OUTPATIENT
Start: 2022-07-26 | End: 2022-07-26 | Stop reason: HOSPADM

## 2022-07-25 RX ORDER — ONDANSETRON 2 MG/ML
4 INJECTION INTRAMUSCULAR; INTRAVENOUS EVERY 12 HOURS PRN
Status: DISCONTINUED | OUTPATIENT
Start: 2022-07-25 | End: 2022-07-26 | Stop reason: HOSPADM

## 2022-07-25 RX ORDER — CLOPIDOGREL BISULFATE 75 MG/1
75 TABLET ORAL DAILY
Status: DISCONTINUED | OUTPATIENT
Start: 2022-07-26 | End: 2022-07-26 | Stop reason: HOSPADM

## 2022-07-25 RX ORDER — ASPIRIN 81 MG/1
81 TABLET ORAL DAILY
Status: DISCONTINUED | OUTPATIENT
Start: 2022-07-26 | End: 2022-07-26 | Stop reason: HOSPADM

## 2022-07-25 RX ADMIN — ENOXAPARIN SODIUM 40 MG: 100 INJECTION SUBCUTANEOUS at 11:07

## 2022-07-25 NOTE — PROVIDER TRANSFER
Outside Transfer Acceptance Note / Regional Referral Center    Referring facility: [facility]   Referring provider: JOHN GRIFFITH  Accepting facility: [facility]  Accepting provider: RANDAL LOTT  Admitting provider: BHARGAV NATION  Reason for transfer: Higher Level of Care   Transfer diagnosis: tia  Transfer specialty requested: Neurology  Transfer specialty notified: no  Transfer level: NUMBER 1-5: 2  Bed type requested: TELEMETRY   Isolation status: No active isolations   Admission class or status: OP- Observation      Narrative     The patient is a 75 y/o female with PMH of HTN, HLP, 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 She presented with difficulty communicating, felt like the room was spinning, and weakness while watching TV with her  earlier tonight around 10 PM.  put her in their personal vehicle and brought her to the ER. On initial exam symptoms were resolving and then on re-evaluation symptoms were completely resolved to NIH of 0. Initial SBP of 200 but patient had not taken her lisinopril. She was given her lisinopril 20 mg and BP improved into the 180s then 150s. She was also given a full dose ASA. CT head negative. Referring facility seeking transfer for neurology evaluation for TIA as no neurology service is currently available.       Objective     Vitals:    Recent Labs: All pertinent labs within the past 24 hours have been reviewed.  Recent imaging: CT head negative    Airway:     Vent settings:     IV access:        Peripheral IV - Single Lumen 07/24/22 2309 20 G Left Antecubital (Active)   Site Assessment Clean;Dry;Intact;No redness;No swelling;No warmth;No drainage 07/24/22 2310   Extremity Assessment Distal to IV No redness;No swelling;No warmth;No abnormal discoloration 07/24/22 2310   Line Status Blood return noted;Flushed;Saline locked 07/24/22 2310   Dressing Status Clean;Intact;Dry 07/24/22 2310   Dressing Intervention First dressing  07/24/22 2310     Infusions:   Allergies: Review of patient's allergies indicates:  No Known Allergies   NPO: No      Anticoagulation:   Anticoagulants     None           Instructions      [facility]  Admit to Hospital Medicine  Upon patient arrival to floor, please contact Hospital Medicine on call.

## 2022-07-26 VITALS
WEIGHT: 182 LBS | HEIGHT: 65 IN | RESPIRATION RATE: 18 BRPM | BODY MASS INDEX: 30.32 KG/M2 | OXYGEN SATURATION: 93 % | SYSTOLIC BLOOD PRESSURE: 195 MMHG | TEMPERATURE: 98 F | DIASTOLIC BLOOD PRESSURE: 98 MMHG | HEART RATE: 73 BPM

## 2022-07-26 PROBLEM — R29.818 TRANSIENT NEUROLOGICAL SYMPTOMS: Status: ACTIVE | Noted: 2022-07-25

## 2022-07-26 PROBLEM — N39.0 UTI (URINARY TRACT INFECTION): Status: ACTIVE | Noted: 2022-07-26

## 2022-07-26 LAB
ANION GAP SERPL CALC-SCNC: 14 MMOL/L (ref 8–16)
AV INDEX (PROSTH): 0.8
AV MEAN GRADIENT: 3 MMHG
AV PEAK GRADIENT: 5 MMHG
AV VALVE AREA: 2.68 CM2
AV VELOCITY RATIO: 0.83
BACTERIA #/AREA URNS HPF: ABNORMAL /HPF
BASOPHILS # BLD AUTO: 0.03 K/UL (ref 0–0.2)
BASOPHILS NFR BLD: 0.4 % (ref 0–1.9)
BILIRUB UR QL STRIP: NEGATIVE
BSA FOR ECHO PROCEDURE: 1.95 M2
BUN SERPL-MCNC: 13 MG/DL (ref 8–23)
CALCIUM SERPL-MCNC: 9.6 MG/DL (ref 8.7–10.5)
CHLORIDE SERPL-SCNC: 107 MMOL/L (ref 95–110)
CK MB SERPL-MCNC: 1.3 NG/ML (ref 0.1–6.5)
CK MB SERPL-RTO: 1.7 % (ref 0–5)
CK SERPL-CCNC: 77 U/L (ref 20–180)
CLARITY UR: CLEAR
CO2 SERPL-SCNC: 22 MMOL/L (ref 23–29)
COLOR UR: YELLOW
CREAT SERPL-MCNC: 0.8 MG/DL (ref 0.5–1.4)
CV ECHO LV RWT: 0.48 CM
DIFFERENTIAL METHOD: NORMAL
DOP CALC AO PEAK VEL: 1.09 M/S
DOP CALC AO VTI: 29.52 CM
DOP CALC LVOT AREA: 3.4 CM2
DOP CALC LVOT DIAMETER: 2.07 CM
DOP CALC LVOT PEAK VEL: 0.9 M/S
DOP CALC LVOT STROKE VOLUME: 79.25 CM3
DOP CALC MV VTI: 35.31 CM
DOP CALCLVOT PEAK VEL VTI: 23.56 CM
E WAVE DECELERATION TIME: 306 MSEC
E/A RATIO: 0.74
E/E' RATIO: 16 M/S
ECHO LV POSTERIOR WALL: 0.86 CM (ref 0.6–1.1)
EJECTION FRACTION: 55 %
EOSINOPHIL # BLD AUTO: 0.4 K/UL (ref 0–0.5)
EOSINOPHIL NFR BLD: 4.3 % (ref 0–8)
ERYTHROCYTE [DISTWIDTH] IN BLOOD BY AUTOMATED COUNT: 13.7 % (ref 11.5–14.5)
EST. GFR  (AFRICAN AMERICAN): >60 ML/MIN/1.73 M^2
EST. GFR  (NON AFRICAN AMERICAN): >60 ML/MIN/1.73 M^2
FRACTIONAL SHORTENING: 23 % (ref 28–44)
GLUCOSE SERPL-MCNC: 121 MG/DL (ref 70–110)
GLUCOSE UR QL STRIP: NEGATIVE
HCT VFR BLD AUTO: 44.1 % (ref 37–48.5)
HGB BLD-MCNC: 14.9 G/DL (ref 12–16)
HGB UR QL STRIP: ABNORMAL
IMM GRANULOCYTES # BLD AUTO: 0.01 K/UL (ref 0–0.04)
IMM GRANULOCYTES NFR BLD AUTO: 0.1 % (ref 0–0.5)
INTERVENTRICULAR SEPTUM: 0.78 CM (ref 0.6–1.1)
KETONES UR QL STRIP: NEGATIVE
LA MAJOR: 3.69 CM
LA MINOR: 2.99 CM
LA WIDTH: 2.55 CM
LEFT ATRIUM SIZE: 2.87 CM
LEFT ATRIUM VOLUME INDEX: 10.8 ML/M2
LEFT ATRIUM VOLUME: 20.55 CM3
LEFT INTERNAL DIMENSION IN SYSTOLE: 2.78 CM (ref 2.1–4)
LEFT VENTRICLE DIASTOLIC VOLUME INDEX: 28.91 ML/M2
LEFT VENTRICLE DIASTOLIC VOLUME: 54.92 ML
LEFT VENTRICLE MASS INDEX: 43 G/M2
LEFT VENTRICLE SYSTOLIC VOLUME INDEX: 15.3 ML/M2
LEFT VENTRICLE SYSTOLIC VOLUME: 29.02 ML
LEFT VENTRICULAR INTERNAL DIMENSION IN DIASTOLE: 3.61 CM (ref 3.5–6)
LEFT VENTRICULAR MASS: 81.85 G
LEUKOCYTE ESTERASE UR QL STRIP: ABNORMAL
LV LATERAL E/E' RATIO: 14.4 M/S
LV SEPTAL E/E' RATIO: 18 M/S
LYMPHOCYTES # BLD AUTO: 2.7 K/UL (ref 1–4.8)
LYMPHOCYTES NFR BLD: 33.7 % (ref 18–48)
MAGNESIUM SERPL-MCNC: 1.8 MG/DL (ref 1.6–2.6)
MCH RBC QN AUTO: 30.1 PG (ref 27–31)
MCHC RBC AUTO-ENTMCNC: 33.8 G/DL (ref 32–36)
MCV RBC AUTO: 89 FL (ref 82–98)
MICROSCOPIC COMMENT: ABNORMAL
MONOCYTES # BLD AUTO: 0.7 K/UL (ref 0.3–1)
MONOCYTES NFR BLD: 8.4 % (ref 4–15)
MV MEAN GRADIENT: 1 MMHG
MV PEAK A VEL: 0.97 M/S
MV PEAK E VEL: 0.72 M/S
MV PEAK GRADIENT: 4 MMHG
MV VALVE AREA BY CONTINUITY EQUATION: 2.24 CM2
NEUTROPHILS # BLD AUTO: 4.3 K/UL (ref 1.8–7.7)
NEUTROPHILS NFR BLD: 53.1 % (ref 38–73)
NITRITE UR QL STRIP: NEGATIVE
NRBC BLD-RTO: 0 /100 WBC
PH UR STRIP: 7 [PH] (ref 5–8)
PHOSPHATE SERPL-MCNC: 3.8 MG/DL (ref 2.7–4.5)
PISA MRMAX VEL: 0.05 M/S
PLATELET # BLD AUTO: 172 K/UL (ref 150–450)
PMV BLD AUTO: 10.3 FL (ref 9.2–12.9)
POTASSIUM SERPL-SCNC: 3.8 MMOL/L (ref 3.5–5.1)
PROT UR QL STRIP: NEGATIVE
RA MAJOR: 3.34 CM
RA PRESSURE: 8 MMHG
RA WIDTH: 2.54 CM
RBC # BLD AUTO: 4.95 M/UL (ref 4–5.4)
RBC #/AREA URNS HPF: 6 /HPF (ref 0–4)
SODIUM SERPL-SCNC: 143 MMOL/L (ref 136–145)
SP GR UR STRIP: 1.01 (ref 1–1.03)
SQUAMOUS #/AREA URNS HPF: 0 /HPF
T4 FREE SERPL-MCNC: 1 NG/DL (ref 0.71–1.51)
TDI LATERAL: 0.05 M/S
TDI SEPTAL: 0.04 M/S
TDI: 0.05 M/S
URN SPEC COLLECT METH UR: ABNORMAL
UROBILINOGEN UR STRIP-ACNC: NEGATIVE EU/DL
WBC # BLD AUTO: 8.1 K/UL (ref 3.9–12.7)
WBC #/AREA URNS HPF: 2 /HPF (ref 0–5)

## 2022-07-26 PROCEDURE — 83735 ASSAY OF MAGNESIUM: CPT | Performed by: NURSE PRACTITIONER

## 2022-07-26 PROCEDURE — 92523 SPEECH SOUND LANG COMPREHEN: CPT

## 2022-07-26 PROCEDURE — 80048 BASIC METABOLIC PNL TOTAL CA: CPT | Performed by: NURSE PRACTITIONER

## 2022-07-26 PROCEDURE — G0427 PR INPT TELEHEALTH CON 70/>M: ICD-10-PCS | Mod: 95,,, | Performed by: NURSE PRACTITIONER

## 2022-07-26 PROCEDURE — G0378 HOSPITAL OBSERVATION PER HR: HCPCS

## 2022-07-26 PROCEDURE — 25000003 PHARM REV CODE 250: Performed by: NURSE PRACTITIONER

## 2022-07-26 PROCEDURE — 85025 COMPLETE CBC W/AUTO DIFF WBC: CPT | Performed by: NURSE PRACTITIONER

## 2022-07-26 PROCEDURE — 93010 ELECTROCARDIOGRAM REPORT: CPT | Mod: ,,, | Performed by: INTERNAL MEDICINE

## 2022-07-26 PROCEDURE — 84439 ASSAY OF FREE THYROXINE: CPT | Performed by: NURSE PRACTITIONER

## 2022-07-26 PROCEDURE — 96365 THER/PROPH/DIAG IV INF INIT: CPT | Mod: 59

## 2022-07-26 PROCEDURE — 84100 ASSAY OF PHOSPHORUS: CPT | Performed by: NURSE PRACTITIONER

## 2022-07-26 PROCEDURE — 99900035 HC TECH TIME PER 15 MIN (STAT)

## 2022-07-26 PROCEDURE — 25500020 PHARM REV CODE 255: Performed by: FAMILY MEDICINE

## 2022-07-26 PROCEDURE — 93005 ELECTROCARDIOGRAM TRACING: CPT

## 2022-07-26 PROCEDURE — 63600175 PHARM REV CODE 636 W HCPCS: Performed by: NURSE PRACTITIONER

## 2022-07-26 PROCEDURE — 97161 PT EVAL LOW COMPLEX 20 MIN: CPT

## 2022-07-26 PROCEDURE — 82553 CREATINE MB FRACTION: CPT | Performed by: NURSE PRACTITIONER

## 2022-07-26 PROCEDURE — G0427 INPT/ED TELECONSULT70: HCPCS | Mod: 95,,, | Performed by: NURSE PRACTITIONER

## 2022-07-26 PROCEDURE — 81000 URINALYSIS NONAUTO W/SCOPE: CPT | Performed by: NURSE PRACTITIONER

## 2022-07-26 PROCEDURE — 93010 EKG 12-LEAD: ICD-10-PCS | Mod: ,,, | Performed by: INTERNAL MEDICINE

## 2022-07-26 PROCEDURE — 94761 N-INVAS EAR/PLS OXIMETRY MLT: CPT

## 2022-07-26 PROCEDURE — 97165 OT EVAL LOW COMPLEX 30 MIN: CPT

## 2022-07-26 RX ORDER — CLOPIDOGREL BISULFATE 75 MG/1
75 TABLET ORAL DAILY
Qty: 21 TABLET | Refills: 0 | Status: SHIPPED | OUTPATIENT
Start: 2022-07-26 | End: 2022-08-03 | Stop reason: SDUPTHER

## 2022-07-26 RX ORDER — ASPIRIN 81 MG/1
81 TABLET ORAL DAILY
Qty: 30 TABLET | Refills: 11 | Status: SHIPPED | OUTPATIENT
Start: 2022-07-27 | End: 2023-10-24

## 2022-07-26 RX ORDER — ATORVASTATIN CALCIUM 40 MG/1
40 TABLET, FILM COATED ORAL DAILY
Qty: 90 TABLET | Refills: 3 | Status: SHIPPED | OUTPATIENT
Start: 2022-07-26 | End: 2023-07-26

## 2022-07-26 RX ORDER — CIPROFLOXACIN 500 MG/1
500 TABLET ORAL 2 TIMES DAILY
Qty: 20 TABLET | Refills: 0 | Status: SHIPPED | OUTPATIENT
Start: 2022-07-26 | End: 2022-08-05

## 2022-07-26 RX ADMIN — ASPIRIN 81 MG: 81 TABLET, COATED ORAL at 08:07

## 2022-07-26 RX ADMIN — CLOPIDOGREL 75 MG: 75 TABLET, FILM COATED ORAL at 08:07

## 2022-07-26 RX ADMIN — CEFTRIAXONE 1 G: 1 INJECTION, SOLUTION INTRAVENOUS at 02:07

## 2022-07-26 RX ADMIN — IOHEXOL 100 ML: 350 INJECTION, SOLUTION INTRAVENOUS at 10:07

## 2022-07-26 RX ADMIN — ATORVASTATIN CALCIUM 80 MG: 40 TABLET, FILM COATED ORAL at 08:07

## 2022-07-26 NOTE — ASSESSMENT & PLAN NOTE
-start statin  -lipid panel noted  -needs dietary changes ( states family is bringing her donuts in the morning)

## 2022-07-26 NOTE — PLAN OF CARE
Problem: Adult Inpatient Plan of Care  Goal: Plan of Care Review  Outcome: Ongoing, Progressing   Patient came to telemetry unit from [facility]. Pt is alert. Oriented X4. But pt has difficulty to process her thoughts, so some of difficulty of speaking noted. Her  said that's not new, its from the residue from last stroke that was 4 or 5 years ago. Pt  said her head spinning and body weakness resolved after arrival the ED. Other than that no any signs and symptoms of stroke noted.     On room air, O2 sat maintain 97%, no respiratory distress noted. On cardiac monitor, running normal sinus rhythm.     Deny nausea/vomiting/diarrhea. She complaint neck soreness. Due medications given.     Maintain fall risk precaution, bed in lowest position, bed alarm on.  remains at the bedside. Call light/personal items in reach. Instructed patient call for help as needed. Will continue to monitor.

## 2022-07-26 NOTE — PHARMACY MED REC
"[facility]           Pharmacy  Admission Medication Reconciliation     The home medication history was taken by Epifanio Ferreira PharmD.      Medication history obtained from Medications listed below were obtained from: Patient/family    Based on information gathered for medication list, you may go to "Admission" then "Reconcile Home Medications" tabs to review and/or act upon those items.      The home medication list has been updated by the Pharmacy department.    Please read ALL comments highlighted in yellow.    Please address this information as you see fit.     Feel free to contact us if you have any questions or require assistance.    The current inpatient medication list has been compared to the home medication list and the following discrepancies were noted:     Patient reports he/she IS TAKING the following which was not ordered upon admit  o Lisinopril 20mg daily      Current Facility-Administered Medications on File Prior to Encounter   Medication Dose Route Frequency Provider Last Rate Last Admin    [DISCONTINUED] acetaminophen tablet 650 mg  650 mg Oral Q6H PRN Oxana Oncale, DO        [DISCONTINUED] aspirin chewable tablet 81 mg  81 mg Oral Daily Oxana Everett DO         Current Outpatient Medications on File Prior to Encounter   Medication Sig Dispense Refill    aspirin 81 MG Chew Take 81 mg by mouth once daily.      lisinopril (PRINIVIL,ZESTRIL) 20 MG tablet TAKE ONE TABLET BY MOUTH ONCE DAILY 90 tablet 3       Please address this information as you see fit.  Feel free to contact us if you have any questions or require assistance.    Epifanio Ferreira, PharmD  [phone]                  .          "

## 2022-07-26 NOTE — PLAN OF CARE
Problem: Adult Inpatient Plan of Care  Goal: Plan of Care Review  Outcome: Ongoing, Progressing     VIRTUAL NURSE:  Cued into patient's room.  Permission received per patient to turn camera to view patient.  Introduced as VN for night shift that will be working with floor nurse and nursing assistant.  Educated patient on VN's role in patient care and  VIP model.  Plan of care reviewed with patient.  Education per flowsheet.   Informed patient that staff will round on them every 2 hours but to use call light for any other needs they may have; informed of fall risk and fall precautions.  Patient verbalized understanding.  Call light within reach; bed siderails up x2.  Opportunity given for questions and questions answered.  Admission assessment questions answered.  Instructed to call for assistance.  Will cont to monitor and intervene as needed.    Labs, notes, orders, and careplan reviewed.       07/25/22 2300   Patient Request   Patient Requested JUAN PABLO Gallagher and patient's spouse at bedside. no complaints or needs currently   Admission   Initial VN Admission Questions Complete   Communication Issues? None   Shift   Virtual Nurse - Rounding Complete   Pain Management Interventions pain management plan reviewed with patient/caregiver   Virtual Nurse - Patient Verbalized Approval Of Camera Use;VN Rounding   Type of Frequent Check   Type Patient Rounds   Safety/Activity   Patient Rounds bed in low position;placement of personal items at bedside;bed wheels locked;call light in patient/parent reach;visualized patient;clutter free environment maintained   Safety Promotion/Fall Prevention assistive device/personal item within reach;diversional activities provided;Fall Risk reviewed with patient/family;Fall Risk signage in place;high risk medications identified;medications reviewed;nonskid shoes/socks when out of bed;room near unit station;side rails raised x 2;supervised activity;instructed to call staff for mobility    Safety Precautions emergency equipment at bedside   Positioning   Body Position neutral body alignment;neutral head position   Head of Bed (HOB) Positioning HOB at 60 degrees   Pain/Comfort/Sleep   Preferred Pain Scale number (Numeric Rating Pain Scale)   Comfort/Acceptable Pain Level 0   Pain Rating (0-10): Rest 0   Sleep/Rest/Relaxation no problem identified;awake

## 2022-07-26 NOTE — PLAN OF CARE
Problem: Occupational Therapy  Goal: Occupational Therapy Goal  Outcome: Adequate for Care Transition       Pt performing at baseline for ADLs and functional mobility. Pt with baseline aphasia and impaired processing, however, pt's  reports slightly increased at this time. Will rec OP SLP if SLP deems necessary from evaluation. Educated pt and spouse on home safety. No further OT needs.  supportive and involved in care. D/c OT

## 2022-07-26 NOTE — PT/OT/SLP EVAL
Occupational Therapy   Evaluation/Dc Summary     Name: Kaylin Neff  MRN: [id number]  Admitting Diagnosis:  Stroke-like symptoms  Recent Surgery: * No surgery found *      Recommendations:     Discharge Recommendations:  (follow SLP recs)  Discharge Equipment Recommendations:  none  Barriers to discharge:  None    Assessment:     Kaylin Neff is a 76 y.o. female with a medical diagnosis of Stroke-like symptoms.  She presents with Diagnoses of TIA (transient ischemic attack), Stroke-like symptom, and Stroke were pertinent to this visit.  . Performance deficits affecting function:  (impaired speech and processing).      Pt performing at baseline for ADLs and functional mobility. Pt with baseline aphasia and impaired processing, however, pt's  reports slightly increased at this time. Will rec OP SLP if SLP deems necessary from evaluation. Educated pt and spouse on home safety. No further OT needs.  supportive and involved in care. D/c OT     Rehab Prognosis: Good; patient would benefit from acute skilled OT services to address these deficits and reach maximum level of function.       Plan:     Patient to be seen  (d/c OT) to address the above listed problems via    · Plan of Care Expires: 07/26/22  · Plan of Care Reviewed with: patient, spouse    Subjective     Chief Complaint: aphasia and processing of commands   Patient/Family Comments/goals: d/c home     Occupational Profile:  Living Environment: with spouse in 2 story home, no steps to enter, bed and tub downstairs with access to t/s combo upstairs   Previous level of function: supervision/mod I;  reports impaired speech/comprehension at baseline. Does not use DME; assists with IADLs   Equipment Used at Home:  none  Assistance upon Discharge: from      Pain/Comfort:  · Pain Rating 1: 0/10    Patients cultural, spiritual, [employer]ybeterian conflicts given the current situation:      Objective:     Communicated with: kiesha prior to session.       General Precautions: Standard, fall   Orthopedic Precautions:N/A   Braces: N/A    Occupational Performance:    Bed Mobility:    · Patient completed Scooting/Bridging with modified independence  · Patient completed Supine to Sit with modified independence  · Patient completed Sit to Supine with modified independence    Functional Mobility/Transfers:  · Patient completed Sit <> Stand Transfer with supervision  with  no assistive device   · Functional Mobility: supervision/mod I without AD     Activities of Daily Living:  · Lower Body Dressing: supervision with cues     Cognitive/Visual Perceptual:  Impaired processing  Aphasia- expressive and receptive   Requires cues for follow through of tasks at times     Physical Exam:  Balance:    -       WFL seated; fair + standing   Postural examination/scapula alignment:    -       Rounded shoulders  Dominant hand:    -       right  Upper Extremity Range of Motion:   BUE WFL   Upper Extremity Strength:  BUE WFL    Strength:  WFL for pt's needs; R only slightly weaker than L   Fine Motor Coordination:  Impaired B, R>L, however, impaired processing appears issues rather than ability to perform     AMPAC 6 Click ADL:  AMPAC Total Score: 18    Treatment & Education:  Pt performing axs as above   Requires slightly increased time 2/2 impaired processing requiring verbal and visual cues for performance of axs   Functional mobility performed in hallway   Discussed home safety with    Discussed d/c recs with pt and spouse   Education:    Patient left supine with all lines intact, call button in reach, bed alarm on, nsg notified and spouse  present    GOALS:   Multidisciplinary Problems     Occupational Therapy Goals        Problem: Occupational Therapy    Goal Priority Disciplines Outcome Interventions   Occupational Therapy Goal     OT, PT/OT Adequate for Care Transition                    History:     Past Medical History:   Diagnosis Date    Depression      Hyperlipidemia     Hypertension     Intracerebral hemorrhage, nontraumatic     in March 2017    Smokes 1 to 10 cigarettes per day 03/11/2017    Stroke        Past Surgical History:   Procedure Laterality Date    BREAST SURGERY      breast implants        Time Tracking:     OT Date of Treatment: 07/26/22  OT Start Time: 0938  OT Stop Time: 0956  OT Total Time (min): 18 min    Billable Minutes:Evaluation 18    7/26/2022
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
## 2022-07-26 NOTE — PT/OT/SLP EVAL
Physical Therapy Evaluation and Discharge Note    Patient Name:  Kaylin Neff   MRN:  [id number]    Recommendations:     Discharge Recommendations:  outpatient speech therapy   Discharge Equipment Recommendations: none   Barriers to Discharge: None    Assessment:     Kaylin Neff is a 76 y.o. female admitted with a medical diagnosis of Stroke-like symptoms. Pt functioning at her baseline mobility with no further PT needs, d/c PT. Pt presenting with baseline aphasia and difficulty processing but pt's  reporting it seems slightly more than her baseline. Recommending OP SLP upon d/c.    Recent Surgery: * No surgery found *      Plan:     During this hospitalization, patient does not require further acute PT services.  Please re-consult if situation changes.      Subjective     Chief Complaint: none  Patient/Family Comments/goals: pt with baseline difficulty speaking and processing but pt's  reporting it seems worse than her baseline (since her stroke 4-5 years ago)  Pain/Comfort:  · Pain Rating 1: 0/10  · Pain Rating Post-Intervention 1: 0/10    Patients cultural, spiritual, Hinduism conflicts given the current situation: no    Living Environment:  Pt lives with her  in a 2  with no CHARLIE and pt's bedroom/bathroom on the 1st floor, tub/shower combo on the 1st floor.  Prior to admission, patients level of function was independent without AD for mobility and ADLs, cooks, cleans.  Equipment used at home: none.  DME owned (not currently used): none.  Upon discharge, patient will have assistance from her .    Objective:     Communicated with nurse Mondragon prior to session. Patient found HOB elevated with   upon PT entry to room.    General Precautions: Standard, fall   Orthopedic Precautions:N/A   Braces: N/A   Respiratory Status: Room air    Exams:  · Difficulty/delayed processing and difficulty with speech - pt's son reporting this is near baseline from prior stroke 4-5 years ago.   reports it seems slightly worse than baseline.  · Fine Motor Coordination:    · -       Impaired  R hand disdiadachokinesia and difficulty processing adn completing R finger opposition  · Gross Motor Coordination:  WFL  · Postural Exam:  Patient presented with the following abnormalities:    · -       Flexed trunk in standing  · Sensation:    · -       Intact  · Skin Integrity/Edema:      · -       Skin integrity: Visible skin intact  · RLE ROM: WFL  · RLE Strength: WFL  · LLE ROM: WFL  · LLE Strength: WFL    Functional Mobility:  · Bed Mobility:     · Scooting: modified independence  · Supine to Sit: modified independence  · Sit to Supine: modified independence  · Transfers:     · Sit to Stand:  independence with no AD  · Gait: >200 ft with no AD at mod I level - ambulates with flexed trunk, no overt LOB or instability, and pt reporting she feels at baseline.    AM-PAC 6 CLICK MOBILITY  Total Score:24       Therapeutic Activities and Exercises:  Educated pt and pt's  on role of PT and pt agreeable to participate in therapy session.  Pt transitioned to sit EOB then ambulated 2 rounds in the calix as reported above.  Pt reporting feeling at her baseline.     AM-PAC 6 CLICK MOBILITY  Total Score:24     Patient left HOB elevated with all lines intact, call button in reach, bed alarm on, nurse notified and pt's  present.    GOALS:   Multidisciplinary Problems     Physical Therapy Goals     Not on file          Multidisciplinary Problems (Resolved)        Problem: Physical Therapy    Goal Priority Disciplines Outcome Goal Variances Interventions   Physical Therapy Goal   (Resolved)     PT, PT/OT Met                     History:     Past Medical History:   Diagnosis Date    Depression     Hyperlipidemia     Hypertension     Intracerebral hemorrhage, nontraumatic     in March 2017    Smokes 1 to 10 cigarettes per day 03/11/2017    Stroke        Past Surgical History:   Procedure Laterality Date     BREAST SURGERY      breast implants        Time Tracking:     PT Received On: 07/26/22  PT Start Time: 0938     PT Stop Time: 0957  PT Total Time (min): 19 min     Billable Minutes: Evaluation 10      07/26/2022
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
## 2022-07-26 NOTE — ASSESSMENT & PLAN NOTE
Reported dizzy or vertigo sensation at home with generalized weakness and confusion reported by   /113 on arrival to ED, given lisinopril with improvement   reports patient is back to baseline with chronic aphasia, right sided weakness, and impaired memory  CT head with chronic findings    -MRI brain completed at prior facility and results pending  -CTA head and neck pending  -echo pending  -ASA, statin, plavix  -TSH elevated, free T4 pending  -lipid panel noted  -A1C pending   -permissive HTN  -consult vascular neurology  -consult ST/PT/OT  -EKG with NSR

## 2022-07-26 NOTE — PLAN OF CARE
Problem: Physical Therapy  Goal: Physical Therapy Goal  Outcome: Met     Pt functioning at her baseline mobility with no further PT needs, d/c PT. Pt presenting with baseline aphasia and difficulty processing but pt's  reporting it seems slightly more than her baseline. Recommending OP SLP upon d/c.

## 2022-07-26 NOTE — SUBJECTIVE & OBJECTIVE
Interval History: awake and alert, standing by the bedside, stating her symptoms has resolved.   Spouse by bedside, - updated on CT head, MRI and TTE report. Patient and spouse requesting to be discharge. Questions and concerns were addressed.     CT and MRI head negative for infarct with mild underlying atrophy and chronic microvascular ischemic change.  TTEwth no shunting   Appreciates PT  rec's     Possible discharge today    Review of Systems   HENT:  Negative for congestion.    Respiratory:  Negative for cough and shortness of breath.    Cardiovascular:  Negative for chest pain.   Gastrointestinal:  Negative for abdominal pain and nausea.   Genitourinary:  Positive for frequency. Negative for dysuria.   Musculoskeletal:  Negative for arthralgias.   Skin:  Negative for wound.   Neurological:  Positive for speech difficulty and weakness. Negative for dizziness, numbness and headaches.   Psychiatric/Behavioral:  Negative for agitation.    Objective:     Vital Signs (Most Recent):  Temp: 97.7 °F (36.5 °C) (07/26/22 1121)  Pulse: 73 (07/26/22 1121)  Resp: 18 (07/26/22 1121)  BP: (!) 195/98 (07/26/22 1121)  SpO2: (!) 93 % (07/26/22 1121)   Vital Signs (24h Range):  Temp:  [96.5 °F (35.8 °C)-97.7 °F (36.5 °C)] 97.7 °F (36.5 °C)  Pulse:  [43-73] 73  Resp:  [14-23] 18  SpO2:  [93 %-99 %] 93 %  BP: (118-195)/(56-98) 195/98     Weight: 82.6 kg (182 lb)  Body mass index is 30.29 kg/m².  No intake or output data in the 24 hours ending 07/26/22 1146   Physical Exam  Vitals and nursing note reviewed.   Constitutional:       General: She is not in acute distress.     Appearance: She is obese.   HENT:      Head: Normocephalic and atraumatic.   Eyes:      Pupils: Pupils are equal, round, and reactive to light.   Cardiovascular:      Rate and Rhythm: Normal rate and regular rhythm.      Pulses: Normal pulses.      Heart sounds: Normal heart sounds.   Pulmonary:      Effort: Pulmonary effort is normal.      Breath sounds: Normal  breath sounds.   Abdominal:      General: Bowel sounds are normal.      Palpations: Abdomen is soft.   Musculoskeletal:         General: No swelling. Normal range of motion.      Cervical back: Normal range of motion.   Skin:     General: Skin is warm and dry.   Neurological:      Mental Status: She is alert and oriented to person, place, and time. Mental status is at baseline.      Motor: Weakness present.      Comments: Aphasia, very mild right side weakness- chronic   Psychiatric:         Mood and Affect: Mood normal.       Significant Labs: A1C:   Recent Labs   Lab 07/25/22  1847   HGBA1C 6.0     ABGs: No results for input(s): PH, PCO2, HCO3, POCSATURATED, BE, TOTALHB, COHB, METHB, O2HB, POCFIO2, PO2 in the last 48 hours.  Blood Culture: No results for input(s): LABBLOO in the last 48 hours.  CBC:   Recent Labs   Lab 07/24/22 2314 07/26/22 0327   WBC 7.60 8.10   HGB 14.4 14.9   HCT 41.8 44.1    172     CMP:   Recent Labs   Lab 07/24/22 2314 07/26/22 0327    143   K 4.1 3.8    107   CO2 26 22*   * 121*   BUN 15 13   CREATININE 0.84 0.8   CALCIUM 9.4 9.6   PROT 6.8  --    ALBUMIN 4.0  --    BILITOT 0.8  --    ALKPHOS 102  --    AST 31  --    ALT 22  --    ANIONGAP 5* 14   EGFRNONAA >60 >60     Lipase: No results for input(s): LIPASE in the last 48 hours.  Lipid Panel:   Recent Labs   Lab 07/25/22  1847   CHOL 234*   HDL 58   LDLCALC 136*   TRIG 129   CHOLHDL 24.8     Magnesium:   Recent Labs   Lab 07/26/22  0327   MG 1.8     POCT Glucose: No results for input(s): POCTGLUCOSE in the last 48 hours.  Prealbumin: No results for input(s): PREALBUMIN in the last 48 hours.  Respiratory Culture: No results for input(s): GSRESP, RESPIRATORYC in the last 48 hours.  Troponin:   Recent Labs   Lab 07/24/22 2314   TROPONINI <0.012     TSH:   Recent Labs   Lab 07/24/22 2314   TSH 8.01*     Urine Culture: No results for input(s): LABURIN in the last 48 hours.  Urine Studies:   Recent Labs   Lab  07/26/22  0714   COLORU Yellow   APPEARANCEUA Clear   PHUR 7.0   SPECGRAV 1.010   PROTEINUA Negative   GLUCUA Negative   KETONESU Negative   BILIRUBINUA Negative   OCCULTUA 1+*   NITRITE Negative   UROBILINOGEN Negative   LEUKOCYTESUR Trace*   RBCUA 6*   WBCUA 2   BACTERIA Rare   SQUAMEPITHEL 0       Significant Imaging: I have reviewed all pertinent imaging results/findings within the past 24 hours.

## 2022-07-26 NOTE — ASSESSMENT & PLAN NOTE
Reported dizzy or vertigo sensation at home with generalized weakness and confusion reported by   /113 on arrival to ED, given lisinopril with improvement   reports patient is back to baseline with chronic aphasia, right sided weakness, and impaired memory  CT head with chronic findings    -MRI brain completed at prior facility and results   1. 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  No evidence of acute infarct.  2. Mild underlying atrophy and chronic microvascular ischemic change.    -CTA head and neck pending  -echo no shunting   -ASA, statin, plavix  -TSH elevated, free T4 pending  -lipid panel noted  -A1C 6.0   -permissive HTN- resume home Bp meds  -consult vascular neurology  -consult ST/PT/OT  -EKG with NSR

## 2022-07-26 NOTE — ASSESSMENT & PLAN NOTE
Extensive counseling on need for BP control and compliance with medications  -permissive HTN- resume home meds with neg MRI

## 2022-07-26 NOTE — ASSESSMENT & PLAN NOTE
Reported dizzy or vertigo sensation at home with generalized weakness and confusion reported by   /113 on arrival to ED, given lisinopril with improvement   reports patient is back to baseline with chronic aphasia, right sided weakness, and impaired memory  CT head with chronic findings    -MRI brain completed at prior facility and results   1. 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  No evidence of acute infarct.  2. Mild underlying atrophy and chronic microvascular ischemic change.    -CTA head and neck pending  -echo no shunting   -ASA, statin, plavix  -TSH elevated, free T4 pending  -lipid panel noted  -A1C 6.0   -permissive HTN- resume home Bp meds  -consult vascular neurology- appreciates rec's   -consult ST/PT/OT  -EKG with NSR
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
## 2022-07-26 NOTE — PLAN OF CARE
Pt seen this date in room for ST velasquez. Pt and spouse report deficits are no worse than last stroke. Pt feels her speech is adequate.

## 2022-07-26 NOTE — PROGRESS NOTES
[facility]                    Pharmacy       Discharge Medication Education    Patient ACCEPTED medication education. Pharmacy has provided education on the name, indication, and possible side effects of the medication(s) prescribed, using teach-back method.     The following medications have also been discussed, during this admission.        Medication List        START taking these medications      aspirin 81 MG EC tablet  Commonly known as: ECOTRIN  Take 1 tablet (81 mg total) by mouth once daily.  Start taking on: July 27, 2022  Replaces: aspirin 81 MG Chew     atorvastatin 40 MG tablet  Commonly known as: LIPITOR  Take 1 tablet (40 mg total) by mouth once daily.     ciprofloxacin HCl 500 MG tablet  Commonly known as: CIPRO  Take 1 tablet (500 mg total) by mouth 2 (two) times daily. for 10 days            CONTINUE taking these medications      lisinopriL 20 MG tablet  Commonly known as: PRINIVIL,ZESTRIL  TAKE ONE TABLET BY MOUTH ONCE DAILY            STOP taking these medications      aspirin 81 MG Chew  Replaced by: aspirin 81 MG EC tablet               Where to Get Your Medications        These medications were sent to [facility]  [address], [place] [address]      Hours: Mon-Fri, 8a-5:30p Phone: [phone]   aspirin 81 MG EC tablet  atorvastatin 40 MG tablet  ciprofloxacin HCl 500 MG tablet          Thank you  Epifanio Ferreira, PharmD  [phone]

## 2022-07-26 NOTE — PLAN OF CARE
SW met with pt at bedside to complete assessment. Pt has  at bedside who participated in assessment. Pt is AxO 3 and able to verbally answer assessment questions. Pt able to confirm demographic information and PCP. At time of discharge pt spouse Kvng is to transport back home.  Pt reports to be independent of ADL's and denies any DME use. Family reports wanting to d/c home today due to feeling fine and also having to feed animals on their land. CLIFF updated whiteboard with [facility] name and contact information. SW confirmed pt understanding of Observation unit and expected discharge plan. SW will continue to follow pt throughout care and assist with any discharge needs.         07/26/22 114   Discharge Planning   Assessment Type Discharge Planning Brief Assessment   Resource/Environmental Concerns none   Support Systems Spouse/significant other;Family members   Equipment Currently Used at Home none   Current Living Arrangements home/apartment/condo   Patient/Family Anticipates Transition to home with family   Patient/Family Anticipated Services at Transition none   DME Needed Upon Discharge  none   Discharge Plan A Home     No future appointments.  CLIFF to request PCP.     [facility] Case Management  [phone]
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
## 2022-07-26 NOTE — ASSESSMENT & PLAN NOTE
77 y/o female with prior stroke (residual aphasia), HTN, HLD, tobacco abuse who presented after an event of possible vision disturbance, weakness, and imbalance.  Patient with residual aphasia an unable to fully explain her symptoms.  MRI with no diffusion restriction to suggest stroke as cause.  Patient was noted to have SBP in 200s and UTI.  Unclear if transient symptoms from TIA, HTN emergency, or encephalopathy in setting of UTI.  Patient has multiple risk factors for stroke and would focus on risk factor modification including BP management along with abx for UTI.    Recommendations:  -ASA 81mg plus plavix 75mg x 21 days then monotherapy with ASA  -Atorvastatin 80mg for goal LDL < 70  -Risk factor modification for HTN, HLD, tobacco abuse  -No further inpatient workup needed and patient can dispo with therapy recommendations once medically ready  -Ambulatory referral to Vascular Neurology in 4-6 weeks (Consult Order [id number])

## 2022-07-26 NOTE — PLAN OF CARE
At time of discharge pt reports being transported home by her  Kvng who is at pt bedside. Pt has appointments added to AVS. Pharmacist will go over home medications and reasons for medications. VN and bedside nurse to reiterate final discharge instructions.         07/26/22 1358   Final Note   Assessment Type Final Discharge Note   Anticipated Discharge Disposition Home   What phone number can be called within the next 1-3 days to see how you are doing after discharge? [phone]   Hospital Resources/Appts/Education Provided Appointments scheduled and added to AVS   Post-Acute Status   Discharge Delays None known at this time     Future Appointments   Date Time Provider Department Center   8/3/2022  8:40 AM Adam Peterson MD [facility]  [facility]      [facility] Case Management  [phone]

## 2022-07-26 NOTE — SUBJECTIVE & OBJECTIVE
I sent in prescription.    Please inform patient    Richi Jaramillo MD     Past Medical History:   Diagnosis Date    Depression     Hyperlipidemia     Hypertension     Intracerebral hemorrhage, nontraumatic     in 2017    Smokes 1 to 10 cigarettes per day 2017    Stroke        Past Surgical History:   Procedure Laterality Date    BREAST SURGERY      breast implants        Family History   Problem Relation Age of Onset    Stroke Mother     Cancer Father         colon    Cancer Brother         prostate       Social History     Socioeconomic History    Marital status:     Number of children: 2   Tobacco Use    Smoking status: Former Smoker     Packs/day: 0.25     Years: 55.00     Pack years: 13.75     Types: Cigarettes     Quit date: 3/11/2017     Years since quittin.3    Smokeless tobacco: Never Used   Substance and Sexual Activity    Alcohol use: Not Currently    Drug use: Never    Sexual activity: Yes     Partners: Male       Current Facility-Administered Medications   Medication Dose Route Frequency Provider Last Rate Last Admin    aspirin EC tablet 81 mg  81 mg Oral Daily Jennifer Valle NP   81 mg at 22 0804    atorvastatin tablet 80 mg  80 mg Oral Daily Jennifer Valle NP   80 mg at 22 0804    cefTRIAXone (ROCEPHIN) 1 g/50 mL D5W IVPB  1 g Intravenous Q24H Jennifer Valle NP   Stopped at 22 0321    clopidogreL tablet 75 mg  75 mg Oral Daily Jennifer Valle NP   75 mg at 22 0804    enoxaparin injection 40 mg  40 mg Subcutaneous Daily Jennifer Valle NP   40 mg at 22 2309    labetaloL injection 10 mg  10 mg Intravenous Q15 Min PRN Jennifer Valle NP        ondansetron injection 4 mg  4 mg Intravenous Q12H PRN Jennifer Valle NP        sodium chloride 0.9% bolus 500 mL  500 mL Intravenous Continuous PRN Jennifer Valle NP        sodium chloride 0.9% flush 10 mL  10 mL Intravenous PRN Jennifer Valle NP         Home medications reviewed    Review of patient's allergies  "indicates:  No Known Allergies      Review of Systems   Constitutional:  Negative for activity change and fever.   HENT:  Negative for drooling and trouble swallowing.    Eyes:  Negative for photophobia, pain and visual disturbance.   Respiratory:  Negative for cough and shortness of breath.    Cardiovascular:  Negative for chest pain, palpitations and leg swelling.   Gastrointestinal:  Negative for abdominal pain, diarrhea, nausea and vomiting.   Genitourinary:  Positive for frequency. Negative for difficulty urinating and dysuria.   Musculoskeletal:  Positive for gait problem.   Skin:  Negative for color change and rash.   Neurological:  Positive for weakness (Generalized). Negative for dizziness, facial asymmetry, speech difficulty, light-headedness, numbness and headaches.   Psychiatric/Behavioral:  Negative for agitation and confusion.    Objective:   Vitals: Blood pressure (!) 195/98, pulse 73, temperature 97.7 °F (36.5 °C), temperature source Oral, resp. rate 18, height 5' 5" (1.651 m), weight 82.6 kg (182 lb), SpO2 (!) 93 %, not currently breastfeeding.     Physical Exam  Constitutional:       General: She is not in acute distress.  HENT:      Head: Normocephalic and atraumatic.      Right Ear: External ear normal.      Left Ear: External ear normal.      Nose: Nose normal.   Pulmonary:      Effort: Pulmonary effort is normal. No respiratory distress.   Abdominal:      General: There is no distension.      Tenderness: There is no guarding.   Musculoskeletal:      Cervical back: Normal range of motion.      Right lower leg: No edema.      Left lower leg: No edema.   Skin:     General: Skin is dry.   Neurological:      Mental Status: She is alert.   Psychiatric:         Mood and Affect: Mood normal.         Behavior: Behavior normal.       Neurological Exam  LOC: alert  Attention Span: Good   Language: Expressive aphasia  Articulation: No dysarthria  Orientation: Person, Place, Time   Visual Fields: Full  EOM " (CN III, IV, VI): Full/intact  Facial Sensation (CN V): Normal  Facial Movement (CN VII): Symmetric facial expression    Motor: Arm left    Full antigravity; Full power noted with nurse assistance  Leg left    Full antigravity; Full power noted with nurse assistance  Arm right    Full antigravity; Full power noted with nurse assistance  Leg right   Full antigravity; Full power noted with nurse assistance  Cerebellum: No evidence of appendicular or axial ataxia  Sensation: Rosalio-hypoesthesia right    Diagnostic Results     Brain Imaging   7/25/2022 MRI Brain  No diffusion restriction.  No hemorrhage.  Remote infarct left parietal with susceptibility within area of infarct.  Chronic white matter changes.    Vessel Imaging   7/26/2022 CTA head and neck  No high-grade stenosis or occlusion.    Cardiac Imaging   7/26/2022 TTE  EF 55%; no wall motion abnormality; no thrombus/vegetation/shunt; normal left atrium.

## 2022-07-26 NOTE — SUBJECTIVE & OBJECTIVE
Past Medical History:   Diagnosis Date    Depression     Hyperlipidemia     Hypertension     Intracerebral hemorrhage, nontraumatic     in 2017    Smokes 1 to 10 cigarettes per day 2017    Stroke        Past Surgical History:   Procedure Laterality Date    BREAST SURGERY      breast implants        Review of patient's allergies indicates:  No Known Allergies    Current Facility-Administered Medications on File Prior to Encounter   Medication    [DISCONTINUED] acetaminophen tablet 650 mg    [DISCONTINUED] aspirin chewable tablet 81 mg     Current Outpatient Medications on File Prior to Encounter   Medication Sig    aspirin 81 MG Chew Take 81 mg by mouth once daily.    lisinopril (PRINIVIL,ZESTRIL) 20 MG tablet TAKE ONE TABLET BY MOUTH ONCE DAILY     Family History       Problem Relation (Age of Onset)    Cancer Father, Brother    Stroke Mother          Tobacco Use    Smoking status: Former Smoker     Packs/day: 0.25     Years: 55.00     Pack years: 13.75     Types: Cigarettes     Quit date: 3/11/2017     Years since quittin.3    Smokeless tobacco: Never Used   Substance and Sexual Activity    Alcohol use: Not Currently    Drug use: Never    Sexual activity: Yes     Partners: Male     Review of Systems   Constitutional:  Negative for chills and fever.   HENT:  Negative for congestion.    Eyes:  Negative for visual disturbance.   Respiratory:  Negative for cough and shortness of breath.    Cardiovascular:  Negative for chest pain.   Gastrointestinal:  Negative for abdominal pain and nausea.   Genitourinary:  Positive for frequency. Negative for dysuria.   Musculoskeletal:  Negative for arthralgias.   Skin:  Negative for wound.   Neurological:  Positive for speech difficulty and weakness. Negative for dizziness, numbness and headaches.   Psychiatric/Behavioral:  Positive for agitation.    Objective:     Vital Signs (Most Recent):  Temp: 97.5 °F (36.4 °C) (22 2216)  Pulse: (!) 43 (22  0049)  Resp: 20 (07/26/22 0049)  BP: (!) 140/67 (07/26/22 0049)  SpO2: 99 % (07/26/22 0049)   Vital Signs (24h Range):  Temp:  [97.5 °F (36.4 °C)] 97.5 °F (36.4 °C)  Pulse:  [43-65] 43  Resp:  [12-23] 20  SpO2:  [94 %-99 %] 99 %  BP: (118-175)/(56-77) 140/67     Weight: 82.7 kg (182 lb 5.1 oz)  Body mass index is 28.99 kg/m².    Physical Exam  Vitals and nursing note reviewed.   Constitutional:       General: She is not in acute distress.     Appearance: She is obese.   HENT:      Head: Normocephalic and atraumatic.      Nose: Nose normal.      Mouth/Throat:      Mouth: Mucous membranes are moist.   Eyes:      Pupils: Pupils are equal, round, and reactive to light.   Cardiovascular:      Rate and Rhythm: Regular rhythm. Bradycardia present.      Pulses: Normal pulses.      Heart sounds: Normal heart sounds.   Pulmonary:      Effort: Pulmonary effort is normal.      Breath sounds: Normal breath sounds.   Abdominal:      General: Bowel sounds are normal.      Palpations: Abdomen is soft.   Musculoskeletal:         General: No swelling. Normal range of motion.      Cervical back: Normal range of motion.   Skin:     General: Skin is warm and dry.   Neurological:      Mental Status: She is alert and oriented to person, place, and time. Mental status is at baseline.      Motor: Weakness present.      Comments: Aphasia, very mild right side weakness, forgetful   Psychiatric:      Comments: Agitated, initially hostile         CRANIAL NERVES     CN III, IV, VI   Pupils are equal, round, and reactive to light.     Significant Labs: All pertinent labs within the past 24 hours have been reviewed.    Significant Imaging: I have reviewed all pertinent imaging results/findings within the past 24 hours.
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
## 2022-08-03 PROBLEM — I61.9 LEFT-SIDED NONTRAUMATIC INTRACEREBRAL HEMORRHAGE: Status: RESOLVED | Noted: 2017-03-11 | Resolved: 2022-08-03

## 2024-10-29 NOTE — ASSESSMENT & PLAN NOTE
Kaylin Neff is a 71 y.o. female with PMHx of HTN with L frontal ICH    Antithrombotics for secondary stroke prevention: None: Reason:  Hemorrhagic Stroke, Statins for secondary stroke prevention and hyperlipidemia, if present: Atorvastatin- 40 mg oral daily, Aggressive risk factor modification: Hypertension and Smoking, Rehab Efforts: Physical Therapy, Occupational Therapy and Speech and Language Pathology, Diagnostics: Ordered/Pending HgbA1C to assess blood glucose levels, Lipid Profile to assess cholesterol levels, MRA head to assess vasculature, MRA neck/arch to assess vasculature, MRI head without contrast to assess brain parenchyma, Trans-thoracic cardiac echo to assess cardiac function/status, TSH to assess thyroid function, VTE Prophylaxis: SCDs, BP Parameters: SBP <140, Nursing Orders: Neuro checks- Q1H, Swallowing evaluation by Nursing, Seizure precautions, Out of bed BID, Avoid Landaverde catheter, Pneumatic compression device, Stroke Education, Blood glucose target 100-130, Up with assistance and IV Fluids: per primary team                               Patient is stable without evidence of recurrence of disease.  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  Recommend follow-up PET CT scan in another 3 months.

## 2025-03-07 NOTE — ASSESSMENT & PLAN NOTE
Chronic, at goal (stable), continue current treatment plan   -Stroke risk factor  -Long term goal < 130/80 - this can be achieved over the next 3-4 weeks